# Patient Record
Sex: FEMALE | Race: WHITE | Employment: OTHER | URBAN - METROPOLITAN AREA
[De-identification: names, ages, dates, MRNs, and addresses within clinical notes are randomized per-mention and may not be internally consistent; named-entity substitution may affect disease eponyms.]

---

## 2019-05-06 ENCOUNTER — HOSPITAL ENCOUNTER (INPATIENT)
Facility: CLINIC | Age: 79
LOS: 7 days | Discharge: ANOTHER HOSPITAL | DRG: 189 | End: 2019-05-13
Attending: EMERGENCY MEDICINE | Admitting: HOSPITALIST
Payer: MEDICARE

## 2019-05-06 ENCOUNTER — APPOINTMENT (OUTPATIENT)
Dept: GENERAL RADIOLOGY | Facility: CLINIC | Age: 79
DRG: 189 | End: 2019-05-06
Attending: EMERGENCY MEDICINE
Payer: MEDICARE

## 2019-05-06 DIAGNOSIS — R41.0 SUBACUTE DELIRIUM: ICD-10-CM

## 2019-05-06 DIAGNOSIS — H04.123 DRY EYES: Primary | ICD-10-CM

## 2019-05-06 DIAGNOSIS — E11.21 TYPE 2 DIABETES MELLITUS WITH DIABETIC NEPHROPATHY, UNSPECIFIED WHETHER LONG TERM INSULIN USE (H): ICD-10-CM

## 2019-05-06 DIAGNOSIS — J44.1 COPD EXACERBATION (H): ICD-10-CM

## 2019-05-06 DIAGNOSIS — C34.11 MALIGNANT NEOPLASM OF UPPER LOBE OF RIGHT LUNG (H): ICD-10-CM

## 2019-05-06 DIAGNOSIS — K59.09 OTHER CONSTIPATION: ICD-10-CM

## 2019-05-06 LAB
ANION GAP SERPL CALCULATED.3IONS-SCNC: 5 MMOL/L (ref 3–14)
APTT PPP: 54 SEC (ref 22–37)
BASE EXCESS BLDV CALC-SCNC: 4.4 MMOL/L
BASOPHILS # BLD AUTO: 0 10E9/L (ref 0–0.2)
BASOPHILS NFR BLD AUTO: 0.1 %
BUN SERPL-MCNC: 29 MG/DL (ref 7–30)
CALCIUM SERPL-MCNC: 9 MG/DL (ref 8.5–10.1)
CHLORIDE SERPL-SCNC: 102 MMOL/L (ref 94–109)
CO2 SERPL-SCNC: 31 MMOL/L (ref 20–32)
CREAT SERPL-MCNC: 0.94 MG/DL (ref 0.52–1.04)
DIFFERENTIAL METHOD BLD: ABNORMAL
EOSINOPHIL # BLD AUTO: 0.1 10E9/L (ref 0–0.7)
EOSINOPHIL NFR BLD AUTO: 1.6 %
ERYTHROCYTE [DISTWIDTH] IN BLOOD BY AUTOMATED COUNT: 16 % (ref 10–15)
ERYTHROCYTE [DISTWIDTH] IN BLOOD BY AUTOMATED COUNT: 16 % (ref 10–15)
GFR SERPL CREATININE-BSD FRML MDRD: 58 ML/MIN/{1.73_M2}
GLUCOSE SERPL-MCNC: 158 MG/DL (ref 70–99)
HCO3 BLDV-SCNC: 34 MMOL/L (ref 21–28)
HCT VFR BLD AUTO: 36.3 % (ref 35–47)
HCT VFR BLD AUTO: 37.1 % (ref 35–47)
HGB BLD-MCNC: 11.3 G/DL (ref 11.7–15.7)
HGB BLD-MCNC: 11.4 G/DL (ref 11.7–15.7)
IMM GRANULOCYTES # BLD: 0 10E9/L (ref 0–0.4)
IMM GRANULOCYTES NFR BLD: 0.1 %
INTERPRETATION ECG - MUSE: NORMAL
LACTATE BLD-SCNC: 0.7 MMOL/L (ref 0.7–2)
LYMPHOCYTES # BLD AUTO: 0.3 10E9/L (ref 0.8–5.3)
LYMPHOCYTES NFR BLD AUTO: 3.8 %
MCH RBC QN AUTO: 29.4 PG (ref 26.5–33)
MCH RBC QN AUTO: 29.7 PG (ref 26.5–33)
MCHC RBC AUTO-ENTMCNC: 30.7 G/DL (ref 31.5–36.5)
MCHC RBC AUTO-ENTMCNC: 31.1 G/DL (ref 31.5–36.5)
MCV RBC AUTO: 96 FL (ref 78–100)
MCV RBC AUTO: 96 FL (ref 78–100)
MONOCYTES # BLD AUTO: 0.1 10E9/L (ref 0–1.3)
MONOCYTES NFR BLD AUTO: 1.6 %
NEUTROPHILS # BLD AUTO: 7.4 10E9/L (ref 1.6–8.3)
NEUTROPHILS NFR BLD AUTO: 92.8 %
NRBC # BLD AUTO: 0 10*3/UL
NRBC BLD AUTO-RTO: 0 /100
NT-PROBNP SERPL-MCNC: 551 PG/ML (ref 0–1800)
O2/TOTAL GAS SETTING VFR VENT: ABNORMAL %
OXYHGB MFR BLDV: 54 %
PCO2 BLDV: 80 MM HG (ref 40–50)
PH BLDV: 7.24 PH (ref 7.32–7.43)
PLATELET # BLD AUTO: 175 10E9/L (ref 150–450)
PLATELET # BLD AUTO: 181 10E9/L (ref 150–450)
PO2 BLDV: 35 MM HG (ref 25–47)
POTASSIUM SERPL-SCNC: 3.7 MMOL/L (ref 3.4–5.3)
PROCALCITONIN SERPL-MCNC: 0.06 NG/ML
RBC # BLD AUTO: 3.8 10E12/L (ref 3.8–5.2)
RBC # BLD AUTO: 3.88 10E12/L (ref 3.8–5.2)
SODIUM SERPL-SCNC: 138 MMOL/L (ref 133–144)
WBC # BLD AUTO: 7.9 10E9/L (ref 4–11)
WBC # BLD AUTO: 8.3 10E9/L (ref 4–11)

## 2019-05-06 PROCEDURE — 82805 BLOOD GASES W/O2 SATURATION: CPT | Performed by: EMERGENCY MEDICINE

## 2019-05-06 PROCEDURE — 94640 AIRWAY INHALATION TREATMENT: CPT

## 2019-05-06 PROCEDURE — 40000274 ZZH STATISTIC RCP CONSULT EA 30 MIN

## 2019-05-06 PROCEDURE — 85025 COMPLETE CBC W/AUTO DIFF WBC: CPT | Performed by: EMERGENCY MEDICINE

## 2019-05-06 PROCEDURE — 40000275 ZZH STATISTIC RCP TIME EA 10 MIN

## 2019-05-06 PROCEDURE — 40000141 ZZH STATISTIC PERIPHERAL IV START W/O US GUIDANCE

## 2019-05-06 PROCEDURE — 36415 COLL VENOUS BLD VENIPUNCTURE: CPT | Performed by: PHYSICIAN ASSISTANT

## 2019-05-06 PROCEDURE — 99223 1ST HOSP IP/OBS HIGH 75: CPT | Mod: AI | Performed by: PHYSICIAN ASSISTANT

## 2019-05-06 PROCEDURE — 36415 COLL VENOUS BLD VENIPUNCTURE: CPT | Performed by: EMERGENCY MEDICINE

## 2019-05-06 PROCEDURE — 25000128 H RX IP 250 OP 636: Performed by: EMERGENCY MEDICINE

## 2019-05-06 PROCEDURE — 27210338 ZZH CIRCUIT HUMID FACE/TRACH MSK

## 2019-05-06 PROCEDURE — 94660 CPAP INITIATION&MGMT: CPT

## 2019-05-06 PROCEDURE — 80048 BASIC METABOLIC PNL TOTAL CA: CPT | Performed by: EMERGENCY MEDICINE

## 2019-05-06 PROCEDURE — 83880 ASSAY OF NATRIURETIC PEPTIDE: CPT | Performed by: EMERGENCY MEDICINE

## 2019-05-06 PROCEDURE — 25000125 ZZHC RX 250: Performed by: PHYSICIAN ASSISTANT

## 2019-05-06 PROCEDURE — 25000125 ZZHC RX 250: Performed by: EMERGENCY MEDICINE

## 2019-05-06 PROCEDURE — 12000000 ZZH R&B MED SURG/OB

## 2019-05-06 PROCEDURE — 83605 ASSAY OF LACTIC ACID: CPT | Performed by: HOSPITALIST

## 2019-05-06 PROCEDURE — 99285 EMERGENCY DEPT VISIT HI MDM: CPT | Mod: 25

## 2019-05-06 PROCEDURE — 25000128 H RX IP 250 OP 636: Performed by: PHYSICIAN ASSISTANT

## 2019-05-06 PROCEDURE — 25000128 H RX IP 250 OP 636: Performed by: HOSPITALIST

## 2019-05-06 PROCEDURE — 85730 THROMBOPLASTIN TIME PARTIAL: CPT | Performed by: HOSPITALIST

## 2019-05-06 PROCEDURE — 36415 COLL VENOUS BLD VENIPUNCTURE: CPT | Performed by: HOSPITALIST

## 2019-05-06 PROCEDURE — 94640 AIRWAY INHALATION TREATMENT: CPT | Mod: 76

## 2019-05-06 PROCEDURE — 84145 PROCALCITONIN (PCT): CPT | Performed by: EMERGENCY MEDICINE

## 2019-05-06 PROCEDURE — 85027 COMPLETE CBC AUTOMATED: CPT | Performed by: PHYSICIAN ASSISTANT

## 2019-05-06 PROCEDURE — 25800030 ZZH RX IP 258 OP 636: Performed by: PHYSICIAN ASSISTANT

## 2019-05-06 PROCEDURE — 27210339 ZZH CIRCUIT HUMIDITY W/CPAP BIP

## 2019-05-06 PROCEDURE — 93005 ELECTROCARDIOGRAM TRACING: CPT

## 2019-05-06 PROCEDURE — 71046 X-RAY EXAM CHEST 2 VIEWS: CPT

## 2019-05-06 RX ORDER — CETIRIZINE HYDROCHLORIDE 10 MG/1
10 TABLET ORAL DAILY
COMMUNITY

## 2019-05-06 RX ORDER — CETIRIZINE HYDROCHLORIDE 10 MG/1
10 TABLET ORAL DAILY
Status: DISCONTINUED | OUTPATIENT
Start: 2019-05-06 | End: 2019-05-06

## 2019-05-06 RX ORDER — ALBUTEROL SULFATE 90 UG/1
2 AEROSOL, METERED RESPIRATORY (INHALATION) EVERY 4 HOURS PRN
Status: DISCONTINUED | OUTPATIENT
Start: 2019-05-06 | End: 2019-05-13 | Stop reason: HOSPADM

## 2019-05-06 RX ORDER — FUROSEMIDE 40 MG
20 TABLET ORAL
COMMUNITY
End: 2019-07-12

## 2019-05-06 RX ORDER — METHYLPREDNISOLONE SODIUM SUCCINATE 125 MG/2ML
60 INJECTION, POWDER, LYOPHILIZED, FOR SOLUTION INTRAMUSCULAR; INTRAVENOUS EVERY 12 HOURS
Status: DISCONTINUED | OUTPATIENT
Start: 2019-05-06 | End: 2019-05-13

## 2019-05-06 RX ORDER — AZITHROMYCIN 500 MG/1
500 INJECTION, POWDER, LYOPHILIZED, FOR SOLUTION INTRAVENOUS ONCE
Status: DISCONTINUED | OUTPATIENT
Start: 2019-05-06 | End: 2019-05-06

## 2019-05-06 RX ORDER — ONDANSETRON 2 MG/ML
4 INJECTION INTRAMUSCULAR; INTRAVENOUS EVERY 6 HOURS PRN
Status: DISCONTINUED | OUTPATIENT
Start: 2019-05-06 | End: 2019-05-13 | Stop reason: HOSPADM

## 2019-05-06 RX ORDER — METOPROLOL TARTRATE 100 MG
100 TABLET ORAL 2 TIMES DAILY
Status: DISCONTINUED | OUTPATIENT
Start: 2019-05-06 | End: 2019-05-06

## 2019-05-06 RX ORDER — AMOXICILLIN 250 MG
1 CAPSULE ORAL 2 TIMES DAILY PRN
Status: DISCONTINUED | OUTPATIENT
Start: 2019-05-06 | End: 2019-05-13 | Stop reason: HOSPADM

## 2019-05-06 RX ORDER — AZITHROMYCIN 500 MG/1
250 INJECTION, POWDER, LYOPHILIZED, FOR SOLUTION INTRAVENOUS EVERY 24 HOURS
Status: CANCELLED | OUTPATIENT
Start: 2019-05-07 | End: 2019-05-10

## 2019-05-06 RX ORDER — ACETAMINOPHEN 500 MG
500-1000 TABLET ORAL EVERY 6 HOURS PRN
Status: DISCONTINUED | OUTPATIENT
Start: 2019-05-06 | End: 2019-05-13 | Stop reason: HOSPADM

## 2019-05-06 RX ORDER — NITROGLYCERIN 0.4 MG/1
0.4 TABLET SUBLINGUAL EVERY 5 MIN PRN
Status: DISCONTINUED | OUTPATIENT
Start: 2019-05-06 | End: 2019-05-13 | Stop reason: HOSPADM

## 2019-05-06 RX ORDER — LEVALBUTEROL 1.25 MG/.5ML
1.25 SOLUTION, CONCENTRATE RESPIRATORY (INHALATION)
Status: DISCONTINUED | OUTPATIENT
Start: 2019-05-06 | End: 2019-05-13 | Stop reason: HOSPADM

## 2019-05-06 RX ORDER — LIDOCAINE 40 MG/G
CREAM TOPICAL
Status: DISCONTINUED | OUTPATIENT
Start: 2019-05-06 | End: 2019-05-13 | Stop reason: HOSPADM

## 2019-05-06 RX ORDER — DILTIAZEM HYDROCHLORIDE 300 MG/1
300 CAPSULE, COATED, EXTENDED RELEASE ORAL DAILY
COMMUNITY
End: 2019-06-24

## 2019-05-06 RX ORDER — IPRATROPIUM BROMIDE AND ALBUTEROL SULFATE 2.5; .5 MG/3ML; MG/3ML
3 SOLUTION RESPIRATORY (INHALATION)
Status: DISCONTINUED | OUTPATIENT
Start: 2019-05-06 | End: 2019-05-06

## 2019-05-06 RX ORDER — METHYLPREDNISOLONE SODIUM SUCCINATE 125 MG/2ML
125 INJECTION, POWDER, LYOPHILIZED, FOR SOLUTION INTRAMUSCULAR; INTRAVENOUS ONCE
Status: COMPLETED | OUTPATIENT
Start: 2019-05-06 | End: 2019-05-06

## 2019-05-06 RX ORDER — FUROSEMIDE 20 MG
20 TABLET ORAL
Status: DISCONTINUED | OUTPATIENT
Start: 2019-05-06 | End: 2019-05-06

## 2019-05-06 RX ORDER — ATORVASTATIN CALCIUM 80 MG/1
40 TABLET, FILM COATED ORAL EVERY EVENING
COMMUNITY
Start: 2019-07-12 | End: 2019-07-31

## 2019-05-06 RX ORDER — PROCHLORPERAZINE 25 MG
12.5 SUPPOSITORY, RECTAL RECTAL EVERY 12 HOURS PRN
Status: DISCONTINUED | OUTPATIENT
Start: 2019-05-06 | End: 2019-05-13 | Stop reason: HOSPADM

## 2019-05-06 RX ORDER — POLYETHYLENE GLYCOL 3350 17 G/17G
17 POWDER, FOR SOLUTION ORAL DAILY PRN
Status: DISCONTINUED | OUTPATIENT
Start: 2019-05-06 | End: 2019-05-07

## 2019-05-06 RX ORDER — METOPROLOL TARTRATE 100 MG
100 TABLET ORAL 2 TIMES DAILY
COMMUNITY

## 2019-05-06 RX ORDER — NALOXONE HYDROCHLORIDE 0.4 MG/ML
.1-.4 INJECTION, SOLUTION INTRAMUSCULAR; INTRAVENOUS; SUBCUTANEOUS
Status: DISCONTINUED | OUTPATIENT
Start: 2019-05-06 | End: 2019-05-13 | Stop reason: HOSPADM

## 2019-05-06 RX ORDER — FUROSEMIDE 40 MG
40 TABLET ORAL EVERY MORNING
COMMUNITY
Start: 2019-07-22 | End: 2019-07-25

## 2019-05-06 RX ORDER — ONDANSETRON 4 MG/1
4 TABLET, ORALLY DISINTEGRATING ORAL EVERY 6 HOURS PRN
Status: DISCONTINUED | OUTPATIENT
Start: 2019-05-06 | End: 2019-05-13 | Stop reason: HOSPADM

## 2019-05-06 RX ORDER — AMOXICILLIN 250 MG
2 CAPSULE ORAL 2 TIMES DAILY PRN
Status: DISCONTINUED | OUTPATIENT
Start: 2019-05-06 | End: 2019-05-13 | Stop reason: HOSPADM

## 2019-05-06 RX ORDER — CEFTRIAXONE 2 G/1
2 INJECTION, POWDER, FOR SOLUTION INTRAMUSCULAR; INTRAVENOUS ONCE
Status: DISCONTINUED | OUTPATIENT
Start: 2019-05-06 | End: 2019-05-06

## 2019-05-06 RX ORDER — METHYLPREDNISOLONE SODIUM SUCCINATE 125 MG/2ML
60 INJECTION, POWDER, LYOPHILIZED, FOR SOLUTION INTRAMUSCULAR; INTRAVENOUS EVERY 12 HOURS
Status: DISCONTINUED | OUTPATIENT
Start: 2019-05-06 | End: 2019-05-06

## 2019-05-06 RX ORDER — ATORVASTATIN CALCIUM 40 MG/1
80 TABLET, FILM COATED ORAL EVERY EVENING
Status: DISCONTINUED | OUTPATIENT
Start: 2019-05-06 | End: 2019-05-06

## 2019-05-06 RX ORDER — PROCHLORPERAZINE MALEATE 10 MG
10 TABLET ORAL EVERY 6 HOURS PRN
COMMUNITY
End: 2019-06-11

## 2019-05-06 RX ORDER — PROCHLORPERAZINE MALEATE 5 MG
5 TABLET ORAL EVERY 6 HOURS PRN
Status: DISCONTINUED | OUTPATIENT
Start: 2019-05-06 | End: 2019-05-13 | Stop reason: HOSPADM

## 2019-05-06 RX ORDER — LANOLIN ALCOHOL/MO/W.PET/CERES
400 CREAM (GRAM) TOPICAL DAILY
COMMUNITY
End: 2019-07-31

## 2019-05-06 RX ORDER — CEFTRIAXONE 2 G/1
2 INJECTION, POWDER, FOR SOLUTION INTRAMUSCULAR; INTRAVENOUS EVERY 24 HOURS
Status: CANCELLED | OUTPATIENT
Start: 2019-05-07

## 2019-05-06 RX ORDER — ACETAMINOPHEN 500 MG
500-1000 TABLET ORAL EVERY 6 HOURS PRN
COMMUNITY
End: 2019-06-11

## 2019-05-06 RX ORDER — LEVOFLOXACIN 5 MG/ML
500 INJECTION, SOLUTION INTRAVENOUS EVERY 24 HOURS
Status: DISCONTINUED | OUTPATIENT
Start: 2019-05-06 | End: 2019-05-13

## 2019-05-06 RX ORDER — ONDANSETRON 8 MG/1
8 TABLET, FILM COATED ORAL EVERY 8 HOURS PRN
COMMUNITY
End: 2019-06-11

## 2019-05-06 RX ORDER — FUROSEMIDE 40 MG
40 TABLET ORAL EVERY MORNING
Status: DISCONTINUED | OUTPATIENT
Start: 2019-05-06 | End: 2019-05-06

## 2019-05-06 RX ORDER — MAGNESIUM SULFATE HEPTAHYDRATE 40 MG/ML
2 INJECTION, SOLUTION INTRAVENOUS ONCE
Status: COMPLETED | OUTPATIENT
Start: 2019-05-06 | End: 2019-05-06

## 2019-05-06 RX ORDER — LANOLIN ALCOHOL/MO/W.PET/CERES
400 CREAM (GRAM) TOPICAL DAILY
Status: DISCONTINUED | OUTPATIENT
Start: 2019-05-06 | End: 2019-05-06

## 2019-05-06 RX ORDER — ALBUTEROL SULFATE 90 UG/1
2 AEROSOL, METERED RESPIRATORY (INHALATION) EVERY 4 HOURS PRN
COMMUNITY

## 2019-05-06 RX ORDER — BISACODYL 10 MG
10 SUPPOSITORY, RECTAL RECTAL DAILY PRN
Status: DISCONTINUED | OUTPATIENT
Start: 2019-05-06 | End: 2019-05-13 | Stop reason: HOSPADM

## 2019-05-06 RX ADMIN — LEVALBUTEROL HYDROCHLORIDE 1.25 MG: 1.25 SOLUTION, CONCENTRATE RESPIRATORY (INHALATION) at 15:19

## 2019-05-06 RX ADMIN — METHYLPREDNISOLONE 125 MG: 125 INJECTION, POWDER, LYOPHILIZED, FOR SOLUTION INTRAMUSCULAR; INTRAVENOUS at 08:33

## 2019-05-06 RX ADMIN — LEVALBUTEROL HYDROCHLORIDE 1.25 MG: 1.25 SOLUTION, CONCENTRATE RESPIRATORY (INHALATION) at 11:48

## 2019-05-06 RX ADMIN — LEVALBUTEROL HYDROCHLORIDE 1.25 MG: 1.25 SOLUTION, CONCENTRATE RESPIRATORY (INHALATION) at 23:03

## 2019-05-06 RX ADMIN — LEVALBUTEROL HYDROCHLORIDE 1.25 MG: 1.25 SOLUTION, CONCENTRATE RESPIRATORY (INHALATION) at 19:18

## 2019-05-06 RX ADMIN — HEPARIN SODIUM 850 UNITS/HR: 10000 INJECTION, SOLUTION INTRAVENOUS at 13:14

## 2019-05-06 RX ADMIN — IPRATROPIUM BROMIDE AND ALBUTEROL SULFATE 3 ML: .5; 3 SOLUTION RESPIRATORY (INHALATION) at 08:30

## 2019-05-06 RX ADMIN — MAGNESIUM SULFATE HEPTAHYDRATE 2 G: 40 INJECTION, SOLUTION INTRAVENOUS at 08:30

## 2019-05-06 RX ADMIN — LEVOFLOXACIN 500 MG: 5 INJECTION, SOLUTION INTRAVENOUS at 12:19

## 2019-05-06 RX ADMIN — DILTIAZEM HYDROCHLORIDE 5 MG/HR: 5 INJECTION INTRAVENOUS at 14:24

## 2019-05-06 RX ADMIN — METHYLPREDNISOLONE SODIUM SUCCINATE 62.5 MG: 125 INJECTION, POWDER, FOR SOLUTION INTRAMUSCULAR; INTRAVENOUS at 20:09

## 2019-05-06 ASSESSMENT — ACTIVITIES OF DAILY LIVING (ADL)
ADLS_ACUITY_SCORE: 17
ADLS_ACUITY_SCORE: 16
ADLS_ACUITY_SCORE: 16

## 2019-05-06 ASSESSMENT — MIFFLIN-ST. JEOR: SCORE: 1388.07

## 2019-05-06 ASSESSMENT — ENCOUNTER SYMPTOMS: SHORTNESS OF BREATH: 1

## 2019-05-06 NOTE — ED NOTES
Bed: ED07  Expected date:   Expected time:   Means of arrival:   Comments:  [id number] Female SOB lung CA, yellow in 15 min

## 2019-05-06 NOTE — ED PROVIDER NOTES
"  History     Chief Complaint:  shortness of breath     HPI   Carine Rodriguez is a 79 year old female with a PMHx significant for hypoxia, malignant neoplasm of lung adenocarcinoma, malignant neoplasm of right lung, CKD, hypertension, atrial fibrillation who presents with shortness of breath. The patient reports that she started to have the shortness of breath last evening. The patient has reports that she was started on Chemo 2 days ago and had her last dose of radiation at least 6 weeks ago. She states that her discomfort started last evening whereupon she felt like she was having trouble getting a breath in. She states that it felt like her chest was not expanding sufficiently to get a breath in.  She denies any actual chest discomfort or pain. She denies fevers. The patient receives her cares at the [facility]. I will obtain and review those records.     Allergies:  Allergies no known allergies     Medications:    Albuterol  Atorvastatin  Cetirizine  Furosemide  Metoprolol  Acetaminophen  Apixaban  Diltiazem  Ipratropium  Folic acid  Zofran  Prochlorperazine    Past Medical History:    COPD  Lung cancer  Hypertension  Atrial fibrillation   Kidney stone    Past Surgical History:    Biopsy lung  Appendectomy    Family History:    No family history on file.    Social History:  The patient comes in by herself today. The patient was a former smoker for 51 years between 1962 - 2013.       Review of Systems   Respiratory: Positive for shortness of breath.    All other systems reviewed and are negative.      Physical Exam     Vital signs  Patient Vitals for the past 24 hrs:   BP Temp Temp src Heart Rate Resp SpO2 Height Weight   05/06/19 0723 135/78 97  F (36.1  C) Temporal 87 16 98 % 1.651 m (5' 5\") 90.7 kg (200 lb)          Physical Exam  Constitutional: Alert, attentive, GCS 15  HENT:    Nose: Nose normal.    Mouth/Throat: Oropharynx is clear, mucous membranes are dry  Eyes: EOM are normal, anicteric, conjugate " gaze  CV: regular rate and rhythm; no murmurs  Chest: Central rhonchi, decreased air movement bilaterally.  GI:  non tender. No distension. No guarding or rebound.    MSK: No LE edema, no tenderness to palpation of BLE.  Neurological: Alert, attentive, moving all extremities equally.   Skin: Skin is warm and dry.      Emergency Department Course   ECG (07:48:17):  Rate 103 bpm. WV interval *. QRS duration 80. QT/QTc 342/448. P-R-T axes * 58 56. Afib with rapid ventricular response. Possible anterior infarct, age undetermined  Abnormal EKG.  Interpreted at 0748 by Esvin Phoenix MD.     Imaging:  XR Chest 2 Views   Final Result   IMPRESSION: Patchy opacities are noted in the right mid and lower   lung, with a more focal opacity noted in the left upper lung. Findings   are nonspecific, and could be related to pneumonia, however underlying   malignancy cannot be excluded. Consider chest CT for further   characterization. There is a small right pleural effusion. Cardiac   silhouette is partially obscured, however heart size appears to be   near the upper limits of normal. Pulmonary vascularity is within   normal limits where seen. Aortic calcification.      CELI GONSALEZ MD        Results as read by radiology.   I communicated the results of the imaging studies with the patient who expressed understanding of these findings.      Laboratory:  CBC: HGB 11.3, MCHC 31.1, RDW 16.0, Absolute Lymphocytes 0.3, otherwise normal   BMP: Glucose 158, GFR Estimate 58, otherwise normal     Procalcitonin 0.06    Interventions:  0830: Duo neb 3 mL   0830: Magnesium Sulphate 2 g   0833: Solu-Medrol, 125 mg, IV  1023: Azithromycin (Zithromax) 500 mg vial to attach to ns 250 ml bag     Emergency Department Course:  Past medical records, nursing notes, and vitals reviewed.  0722: I performed an exam of the patient and obtained history, as documented above.     IV inserted and blood drawn for the above work up to be  conducted.     The patient was sent for imaging studies while in the emergency department, findings above.     EKG was obtained, findings as reported above.     I discussed Mr. Rodriguez's case with Dr. Kunal Mcleod who accepted for Stepdown C.   Impression & Plan    Medical Decision Makin-year-old female with past medical history of new diagnosis of right-sided lung cancer undergoing chemoradiation at [facility], A. fib on apixaban and COPD presenting for evaluation of gradually increasing shortness of breath for the weekend without report of fevers but does have increased cough.  She is normally on oxygen only at night but over the last 2 weeks she has had increasing O2 requirements and is wearing 2 L continuously.  Of note she also did have a pneumothorax after lung biopsy but this has resolved.  On exam, she has poor air movement bilaterally with central rhonchi and prolonged expiratory phase consistent with COPD exacerbation.  She was given duo nebs, Solu-Medrol and mag with improvement in air movement but still significant chest tightness.  She has no chest pain or chest pressure, EKG shows A. fib without overt evidence of ischemia, I have low suspicion for ACS.  I have low suspicion for PE given her gradual onset of symptoms and she is on apixaban.  CT imaging deferred as well given a contrast allergy.  Chest x-ray shows right upper lobe density and read seems similar to her prior chest x-ray done at [facility] on 5 3.  Pending Procalcitonin, I will initiate her on Septra medicine for possible infectious etiology though she is not neutropenic.  Patient was placed on BiPAP more for work of breathing though she was able to speak in clear sentences.  Will admit to IM for continued treatment of COPD exacerbation. VBG pending.       Diagnosis:    ICD-10-CM    1. COPD exacerbation (H) J44.1 Procalcitonin   2. Malignant neoplasm of upper lobe of right lung (H) C34.11        Disposition:  Admitted to  [facility] [place]  Esvin Phoenix MD   [employer]  10:40 AM 05/06/19     I, Mary Beth Rand, am serving as a scribe at 10:30 AM on 5/6/2019 to document services personally performed by Esvin Phoenix MD based on my observations and the provider's statements to me.     EMERGENCY DEPARTMENT       Esvin Phoenix MD  05/06/19 1296

## 2019-05-06 NOTE — PLAN OF CARE
A/O x4. AVSS on BiPAP. Tele Afib RVR. Up w/ 1. Pt denies pain. Diltiazem gtt infusing @ 15ml/hr. Heparin infusing @ 8.5 ml/hr. LS course and rhonchi. NPO. Voiding adequate.

## 2019-05-06 NOTE — PROGRESS NOTES
Patient is on BiPAP 12/5 60% with SpO2 in the mid 90's. BS coarse crackles and expiratory wheezes. Gel pad and mepilex in place. Skin intact. Alarm volume set at 10.  Will cont to monitor.  5/6/2019  Senait Wilks RRT

## 2019-05-06 NOTE — H&P
[facility]    History and Physical - Hospitalist Service       Date of Admission:  5/6/2019    Assessment & Plan   Carine Rodriguez is a 79 year old female with PMHx of adenocarcinoma of the right and left lung s/p radiation (completed in November) now on palliative chemotherapy (first round 2 days ago), COPD, hx of tobacco use, atrial fibrillation on chronic anticoagulation with Eliquis, CKD IIII, and HTN admitted on 5/6/2019 for COPD exacerbation. Pt currently utilizing BiPAP, remainder of vitals WNL. Pt currently stable.     COPD exacerbation  Seasonal allergies: Note acute, worsening of SOB in the past 24-48 hours. Hx of COPD exacerbation treated outpatient with steroids about 1 month ago. Notes seasonal allergies, recent flare, with associated nasal congestion, worsening cough. Afebrile. No recent sick exposures. No associated chest pain. Utilizing home inhalers more frequently. Increased oxygen needs; baseline uses CPAP with 2 LPM at bedtime, but has been using 2 LPM continuously at home. CBC, BMP WNL. . Procal 0.06. CXR with noted patchy opacities in the right mid and lower lung with more focal opacity in the SARA. Pt had echocardiogram 5/2 with EF 68%, no RWMA, RVSP 46 mmHg. Received Solumedrol 125 mg X1, Magnesium 2 g, Duoneb X1 in the ED. Given the above, will treat for COPD exacerbation; since I cannot confirm that infiltrates on CXR aren't purely river, will cover pt with antibiotics for CAP. Also note elevated RVSP, thus some degree of pulmonary HTN may be contributing to above.   [PTA Combivent Respimat 200-100 mcg/ACT inhaler, Albuterol inhaler PRN]  -- IMC status   -- BiPAP PRN, Respiratory following; OK to give oral meds. Ween as tolerated   -- Telemetry, continuous pulse oximetry  -- Solumedrol 62.5 mg IV BID   -- Xopenex nebs q4 hours while awake   -- IV Levaquin (PCN allergy noted)   -- RCAT consulted, encourage pulmonary toilet with IS and acapella   -- CBC and BMP in the  AM     ADDENDUM 5/6 1144  VBG with pH 7.24, pCO2 80, pO2 35, bicarb 34. Reviewing prior VBG from 4/30, pH 7.41, pCO2 57, pO2 27, bicarb 36.   -- Continuous BiPAP until improvement in VBGs  -- Strict NPO, discussed with pharmacy. At this time, regarding PTA meds, will start heparin gtt in place of Eliquis for AC for a fib, will start dilt gtt in place of oral dilt; can always add scheduled IV lopressor q6 if rate note controlled on dilt gtt. Hold Lasix dosing at this time. All other PTA meds reviewed and can be held until pt is off BiPAP.   -- Consider CT chest in the AM w/o contrast (note iodinated contrast allergy, also on AC at this point) if ongoing BiPAP use to better characterize respiratory status    Adenocarcinoma of the lung: Follows with Dr. Gonsalez through Oncology at [facility]. Last visit 4/26/18. Most recent CT chest from 4/19/19 with significantly increased size of right upper lobe perihilar mass narrowing the right upper lobe bronchus, increased size of left upper lobe masslike density, new small to moderate size right pleural effusion, slight increase size subcarinal lymph nodes worrisome for metastatic disease. Pt completed radiation treatment (November), now started palliative chemotherapy with first round two days prior to admission.  -- Defer further cares to outpatient specialist through [facility]   -- Pt was scheduled for repeat PFTs 5/6 at [facility], will need to reschedule appointment     Atrial fibrillation, rate controlled   Chronic anticoagulation use:   -- Continue Eliquis 5 mg po BID   -- Continue Cartia  mg po every day, Lopressor 100 mg po BID   -- Telemetry     HTN, hyperlipidemia  VANESSA: Continue PTA Lasix      CKD III  Hx of partial right nephrectomy: Baseline creatinine ~1. Partial right nephrectomy in the setting of kidney stones.   -- Avoid nephrotoxic agents   -- Monitor renal function     Tobacco use disorder, history off: Previously smoked 1 ppd X~40 years, quit 7 years ago.      Hx of pneumothorax: This was following lung bx in 3/2019.      Diet: NPO except for meds while on BiPAP, once off, ok for regular diet   DVT Prophylaxis: Eliquis  Medina Catheter: not present  Code Status: Full Code    Disposition Plan   Expected discharge: 2 - 3 days, recommended to unclear dispo at this time  once antibiotic plan established and O2 use less than 2 liters/minute.  Entered: Margret Jackson PA-C 05/06/2019, 10:34 AM     The patient's care was discussed with the Attending Physician, Dr. Mcleod, Bedside Nurse, Patient and Patient's Family.    Margret Jackson PA-C  [facility]  ______________________________________________________________________    Chief Complaint   SOB    History is obtained from the patient and patient's daughters who accompany her at bedside.     History of Present Illness   Carine Rodriguez is a 79 year old female with PMHx of adenocarcinoma of the right and left lung s/p radiation (completed in November) now on palliative chemotherapy (first round 2 days ago), COPD, hx of tobacco use, atrial fibrillation on chronic anticoagulation with Eliquis, CKD IIII, and HTN admitted on 5/6/2019 for COPD exacerbation. Pt currently utilizing BiPAP, remainder of vitals WNL. Pt currently stable.     Note acute, worsening of SOB in the past 24-48 hours. Hx of COPD exacerbation treated outpatient with steroids about 1 month ago. Notes seasonal allergies, recent flare, with associated nasal congestion, worsening cough. Afebrile. No recent sick exposures. No associated chest pain. Utilizing home inhalers more frequently. Increased oxygen needs; baseline uses CPAP with 2 LPM at bedtime, but has been using 2 LPM continuously at home.     Presented to the ED. Assessed by Dr. Phoenix who obtained basic labs and imaging. CBC, BMP WNL. . Procal 0.06. CXR with noted patchy opacities in the right mid and lower lung with more focal opacity in the  SARA. Pt had echocardiogram 5/2 with EF 68%, no RWMA, RVSP 46 mmHgReceived Solumedrol 125 mg X1, Magnesium 2 g, Duoneb X1 in the ED.     On my interview, pt is accompanied by her two daughters at bedside. Pt appears to have increased work of breathing, intermittent course cough, no production at this point. Afebrile. Did not take AM meds.     Review of Systems    The 10 point Review of Systems is negative other than noted in the HPI.     Past Medical History    1. Adenocarcinoma of the right and left lung  2. COPD; oxygen as below at bedtime, more recently has been utilizing oxygen continuously (2 LPM)  3. KINJAL, CPAP with 2 LPM supplemental O2 at bedtime  4. Pneumothorax; following lung bx in 3/2019.   5. Atrial fibrillation on chronic anticoagulation with Eliquis   6. HTN   7. Hyperlipidemia   8. VANESSA   9. Seasonal allergies   10. CKD III; baseline creatinine ~1.0  11. Hx of partial right nephrectomy in the setting of kidney stones     Past Surgical History   1. Partial right nephrectomy (lower pole) in the setting of reported nephrolithiasis   2. Appendectomy     Social History   Lives alone in a home in [place], [place]. Has walker and cane, but barely uses for ambulatory assistance. Cancer cares through [facility]. Daughters live in the [facility], so patient stays with them when she travels down to [place]. More recently has been utilizing supplemental oxygen at 2 LPM continuously, prior to that, uses CPAP with 2 LPM at bedtime. Tobacco hx smoking 1 ppd X~40 years, quit 7 years ago. Has a couple jerrod's in the evening; no hx of withdrawal.     Family History   Reviewed and non-contributory to current chief complaint.     Prior to Admission Medications   Prior to Admission Medications   Prescriptions Last Dose Informant Patient Reported? Taking?   Ipratropium-Albuterol (COMBIVENT RESPIMAT)  MCG/ACT inhaler  at prn Self Yes Yes   Sig: Inhale 1 puff into the lungs 4 times daily as needed for shortness of breath / dyspnea or  wheezing   acetaminophen (TYLENOL) 500 MG tablet  at prn Self Yes Yes   Sig: Take 500-1,000 mg by mouth every 6 hours as needed for mild pain   albuterol (PROAIR HFA/PROVENTIL HFA/VENTOLIN HFA) 108 (90 Base) MCG/ACT inhaler  at prn Self Yes Yes   Sig: Inhale 2 puffs into the lungs every 4 hours as needed for shortness of breath / dyspnea or wheezing   apixaban ANTICOAGULANT (ELIQUIS) 5 MG tablet 5/5/2019 at pm Self Yes Yes   Sig: Take 5 mg by mouth 2 times daily   atorvastatin (LIPITOR) 80 MG tablet 5/5/2019 at pm Self Yes Yes   Sig: Take 80 mg by mouth every evening   cetirizine (ZYRTEC) 10 MG tablet 5/5/2019 at am Self Yes Yes   Sig: Take 10 mg by mouth daily   diltiazem ER COATED BEADS (CARDIZEM CD/CARTIA XT) 300 MG 24 hr capsule 5/5/2019 at am Self Yes Yes   Sig: Take 300 mg by mouth daily   folic acid (FOLVITE) 400 MCG tablet 5/5/2019 at am Self Yes Yes   Sig: Take 400 mcg by mouth daily   furosemide (LASIX) 40 MG tablet 5/5/2019 at am Self Yes Yes   Sig: Take 40 mg by mouth every morning   furosemide (LASIX) 40 MG tablet 5/5/2019 at 1200 Self Yes Yes   Sig: Take 20 mg by mouth daily (with lunch) At 1200   metoprolol tartrate (LOPRESSOR) 100 MG tablet 5/5/2019 at pm Self Yes Yes   Sig: Take 100 mg by mouth 2 times daily   ondansetron (ZOFRAN) 8 MG tablet  at prn Self Yes Yes   Sig: Take 8 mg by mouth every 8 hours as needed for nausea (Vomiting)   prochlorperazine (COMPAZINE) 10 MG tablet  at prn Self Yes Yes   Sig: Take 10 mg by mouth every 6 hours as needed for nausea or vomiting      Facility-Administered Medications: None     Allergies   Allergies   Allergen Reactions     Amlodipine      Per Care Everywhere Stout       Bupropion      Per CareEverywhere Stout       Penicillins Rash     Sulfa Drugs Rash       Physical Exam   Vital Signs: Temp: 97  F (36.1  C) Temp src: Temporal BP: 135/78   Heart Rate: 87 Resp: 16 SpO2: 98 % O2 Device: None (Room air)    Weight: 200 lbs 0 oz    CONSTITUTIONAL: Pt laying in bed,  dressed in hospital garb. Appears uncomfortable, with mild/mod increased work of breathing. Cooperative with interview. Accompanied by daughters at bedside.   HEENT: Normocephalic, atraumatic. Negative for conjunctival redness or scleral icterus.  Oral mucosa pink and moist; negative for ulcerations, erythema, or exudates.  Dentition in good repair.   CARDIOVASCULAR: Irregularly irregular rhythm, rate controlled. No murmurs, rubs, or extra heart sounds appreciated. Pulses +2/4 and irregular in upper and lower extremities, bilaterally.   RESPIRATORY: No increased work of breathing.  Supplemental oxygenation via NC at 8 LPM. Course rhonchi throughout all fields.   GASTROINTESTINAL:  Abdomen soft, non-distended. BS auscultated in all four quadrants. Negative for tenderness to  palpation.  No masses or organomegaly noted.  MUSCULOSKELETAL: No gross deformities noted. Normal muscle tone.   HEMATOLOGIC/LYMPHATIC/IMMUNOLOGIC: Negative for lower extremity edema, bilaterally.  NEUROLOGIC: Alert and oriented to person, place, and time.  strength intact. No focal neuro deficits appreciated.   SKIN: Warm, dry, intact. No jaundice noted. Negative for suspicious lesions, rashes, bruising, open sores or abrasions.     Data   Data reviewed today: I reviewed all medications, new labs and imaging results over the last 24 hours. I personally reviewed the EKG tracing showing a fib with  bpm. .    Recent Labs   Lab 05/06/19  0800   WBC 7.9   HGB 11.3*   MCV 96         POTASSIUM 3.7   CHLORIDE 102   CO2 31   BUN 29   CR 0.94   ANIONGAP 5   MELANIE 9.0   *     Recent Results (from the past 24 hour(s))   XR Chest 2 Views    Narrative    CHEST TWO VIEWS May 6, 2019 8:13 AM     HISTORY: Shortness of breath.    COMPARISON: None.      Impression    IMPRESSION: Patchy opacities are noted in the right mid and lower  lung, with a more focal opacity noted in the left upper lung. Findings  are nonspecific, and could be  related to pneumonia, however underlying  malignancy cannot be excluded. Consider chest CT for further  characterization. There is a small right pleural effusion. Cardiac  silhouette is partially obscured, however heart size appears to be  near the upper limits of normal. Pulmonary vascularity is within  normal limits where seen. Aortic calcification.    CELI GONSALEZ MD

## 2019-05-06 NOTE — PLAN OF CARE
PT: PT consult received. Chart reviewed. Pt admitted a few hours ago, currently on Bipap. Will hold PT today and allow for 24hours of medical management prior to initiation of PT services.

## 2019-05-06 NOTE — PHARMACY-ADMISSION MEDICATION HISTORY
Admission medication history interview status for the 5/6/2019  admission is complete. See EPIC admission navigator for prior to admission medications     Medication history source reliability:Moderate    Actions taken by pharmacist (provider contacted, etc):  Spoke w/ patient and her daughters.  Reviewed information in Care Everywhere ([facility]).        Additional medication history information not noted on PTA med list :None    Medication reconciliation/reorder completed by provider prior to medication history? No    Time spent in this activity: 15 minutes    Prior to Admission medications    Medication Sig Last Dose Taking? Auth Provider   acetaminophen (TYLENOL) 500 MG tablet Take 500-1,000 mg by mouth every 6 hours as needed for mild pain  at prn Yes Unknown, Entered By History   albuterol (PROAIR HFA/PROVENTIL HFA/VENTOLIN HFA) 108 (90 Base) MCG/ACT inhaler Inhale 2 puffs into the lungs every 4 hours as needed for shortness of breath / dyspnea or wheezing  at prn Yes Unknown, Entered By History   apixaban ANTICOAGULANT (ELIQUIS) 5 MG tablet Take 5 mg by mouth 2 times daily 5/5/2019 at pm Yes Unknown, Entered By History   atorvastatin (LIPITOR) 80 MG tablet Take 80 mg by mouth every evening 5/5/2019 at pm Yes Unknown, Entered By History   cetirizine (ZYRTEC) 10 MG tablet Take 10 mg by mouth daily 5/5/2019 at am Yes Unknown, Entered By History   diltiazem ER COATED BEADS (CARDIZEM CD/CARTIA XT) 300 MG 24 hr capsule Take 300 mg by mouth daily 5/5/2019 at am Yes Unknown, Entered By History   folic acid (FOLVITE) 400 MCG tablet Take 400 mcg by mouth daily 5/5/2019 at am Yes Unknown, Entered By History   furosemide (LASIX) 40 MG tablet Take 40 mg by mouth every morning 5/5/2019 at am Yes Unknown, Entered By History   furosemide (LASIX) 40 MG tablet Take 20 mg by mouth daily (with lunch) At 1200 5/5/2019 at 1200 Yes Unknown, Entered By History   Ipratropium-Albuterol (COMBIVENT RESPIMAT)  MCG/ACT inhaler  Inhale 1 puff into the lungs 4 times daily as needed for shortness of breath / dyspnea or wheezing  at prn Yes Unknown, Entered By History   metoprolol tartrate (LOPRESSOR) 100 MG tablet Take 100 mg by mouth 2 times daily 5/5/2019 at pm Yes Unknown, Entered By History   ondansetron (ZOFRAN) 8 MG tablet Take 8 mg by mouth every 8 hours as needed for nausea (Vomiting)  at prn Yes Unknown, Entered By History   prochlorperazine (COMPAZINE) 10 MG tablet Take 10 mg by mouth every 6 hours as needed for nausea or vomiting  at prn Yes Unknown, Entered By History     Shyann Mayo, PharmD, BCPS

## 2019-05-06 NOTE — ED NOTES
"[facility]  ED Nurse Handoff Report    ED Chief complaint: Shortness of Breath (started last night/currently getting chemo for lung cancer)      ED Diagnosis:   Final diagnoses:   COPD exacerbation (H)   Malignant neoplasm of upper lobe of right lung (H)       Code Status: Full Code    Allergies: Allergies no known allergies    Activity level - Baseline/Home:  Independent    Activity Level - Current:   Stand with Assist     Needed?: No    Isolation: No  Infection: Not Applicable  Bariatric?: No    Vital Signs:   Vitals:    05/06/19 0723   BP: 135/78   Resp: 16   Temp: 97  F (36.1  C)   TempSrc: Temporal   SpO2: 98%   Weight: 90.7 kg (200 lb)   Height: 1.651 m (5' 5\")       Cardiac Rhythm: ,        Pain level:      Is this patient confused?: No   Does this patient have a guardian?  No         If yes, is there guardianship documents in the Epic \"Code/ACP\" activity?  N/A         Guardian Notified?  N/A  [place] - Suicide Severity Rating Scale Completed?  Yes  If yes, what color did the patient score?  White    Patient Report: Initial Complaint: pt is being treated for lung cancer, has been feeling sob since friday  Focused Assessment: pt is a&ox4  Tests Performed: labs xray  Abnormal Results:   Abnormal Labs Reviewed   CBC WITH PLATELETS DIFFERENTIAL - Abnormal; Notable for the following components:       Result Value    Hemoglobin 11.3 (*)     MCHC 31.1 (*)     RDW 16.0 (*)     Absolute Lymphocytes 0.3 (*)     All other components within normal limits   BASIC METABOLIC PANEL - Abnormal; Notable for the following components:    Glucose 158 (*)     GFR Estimate 58 (*)     All other components within normal limits     Treatments provided:     Family Comments: daughters bedside    OBS brochure/video discussed/provided to patient/family: N/A              Name of person given brochure if not patient:               Relationship to patient:     ED Medications:   Medications   ipratropium - " albuterol 0.5 mg/2.5 mg/3 mL (DUONEB) neb solution 3 mL (3 mLs Nebulization Given 5/6/19 0830)   cefTRIAXone (ROCEPHIN) 2 g vial to attach to  ml bag for ADULTS or NS 50 ml bag for PEDS (has no administration in time range)   azithromycin (ZITHROMAX) 500 mg vial to attach to  mL bag (has no administration in time range)   methylPREDNISolone sodium succinate (solu-MEDROL) injection 125 mg (125 mg Intravenous Given 5/6/19 0833)   magnesium sulfate 2 g in water intermittent infusion (2 g Intravenous New Bag 5/6/19 0830)       Drips infusing?:  No    For the majority of the shift this patient was Green.   Interventions performed were .    Severe Sepsis OR Septic Shock Diagnosis Present: No    To be done/followed up on inpatient unit:      ED NURSE PHONE NUMBER: [phone]

## 2019-05-06 NOTE — PROGRESS NOTES
[facility] RCAT Note    Date:5/6/2019  Admission Diagnosis:COPD exacerbation  Pulmonary History:COPD and lung cancer  Home Nebulizer/ MDI Use:Combivent Respimat QID and Albuterol MDI prn  Home Oxygen Use:2L NC at bedtime, but has been using 2L NC continuously the past few days  Acuity Level (from RT Assessment flow sheet):2    Aerosol Therapy Initiated:Xopenex Q4w/a per MD order due to increased HR.       Pulmonary Hygiene Initiated:Aerobika QID per MD order      Volume Expansion Therapy Initiated:Pt is currently on continuous BiPAP 12/5 60%.      Current Oxygen Requirement:BiPAP 12/5 60%  Current SpO2:95%    Re-evaluation date:5/9/2019    See 'RT Assessments' flow sheet for patient assessment scoring and Acuity Level Details.  Senait Wilks RRT

## 2019-05-07 LAB
ANION GAP SERPL CALCULATED.3IONS-SCNC: 3 MMOL/L (ref 3–14)
APTT PPP: 44 SEC (ref 22–37)
APTT PPP: 48 SEC (ref 22–37)
APTT PPP: 51 SEC (ref 22–37)
APTT PPP: 58 SEC (ref 22–37)
BASOPHILS # BLD AUTO: 0 10E9/L (ref 0–0.2)
BASOPHILS NFR BLD AUTO: 0 %
BUN SERPL-MCNC: 25 MG/DL (ref 7–30)
CALCIUM SERPL-MCNC: 9.3 MG/DL (ref 8.5–10.1)
CHLORIDE SERPL-SCNC: 104 MMOL/L (ref 94–109)
CO2 SERPL-SCNC: 31 MMOL/L (ref 20–32)
CREAT SERPL-MCNC: 0.81 MG/DL (ref 0.52–1.04)
DIFFERENTIAL METHOD BLD: ABNORMAL
EOSINOPHIL # BLD AUTO: 0 10E9/L (ref 0–0.7)
EOSINOPHIL NFR BLD AUTO: 0 %
ERYTHROCYTE [DISTWIDTH] IN BLOOD BY AUTOMATED COUNT: 15.6 % (ref 10–15)
GFR SERPL CREATININE-BSD FRML MDRD: 69 ML/MIN/{1.73_M2}
GLUCOSE SERPL-MCNC: 238 MG/DL (ref 70–99)
HCT VFR BLD AUTO: 34.5 % (ref 35–47)
HGB BLD-MCNC: 10.6 G/DL (ref 11.7–15.7)
IMM GRANULOCYTES # BLD: 0 10E9/L (ref 0–0.4)
IMM GRANULOCYTES NFR BLD: 0.5 %
LMWH PPP CHRO-ACNC: 1.66 IU/ML
LMWH PPP CHRO-ACNC: 1.77 IU/ML
LYMPHOCYTES # BLD AUTO: 0.1 10E9/L (ref 0.8–5.3)
LYMPHOCYTES NFR BLD AUTO: 2.3 %
MCH RBC QN AUTO: 29.1 PG (ref 26.5–33)
MCHC RBC AUTO-ENTMCNC: 30.7 G/DL (ref 31.5–36.5)
MCV RBC AUTO: 95 FL (ref 78–100)
MONOCYTES # BLD AUTO: 0 10E9/L (ref 0–1.3)
MONOCYTES NFR BLD AUTO: 0.2 %
NEUTROPHILS # BLD AUTO: 4.2 10E9/L (ref 1.6–8.3)
NEUTROPHILS NFR BLD AUTO: 97 %
NRBC # BLD AUTO: 0 10*3/UL
NRBC BLD AUTO-RTO: 0 /100
PLATELET # BLD AUTO: 177 10E9/L (ref 150–450)
POTASSIUM SERPL-SCNC: 4.4 MMOL/L (ref 3.4–5.3)
RBC # BLD AUTO: 3.64 10E12/L (ref 3.8–5.2)
SODIUM SERPL-SCNC: 138 MMOL/L (ref 133–144)
WBC # BLD AUTO: 4.4 10E9/L (ref 4–11)

## 2019-05-07 PROCEDURE — 25000125 ZZHC RX 250: Performed by: PHYSICIAN ASSISTANT

## 2019-05-07 PROCEDURE — 80048 BASIC METABOLIC PNL TOTAL CA: CPT | Performed by: PHYSICIAN ASSISTANT

## 2019-05-07 PROCEDURE — 40000275 ZZH STATISTIC RCP TIME EA 10 MIN

## 2019-05-07 PROCEDURE — 85025 COMPLETE CBC W/AUTO DIFF WBC: CPT | Performed by: PHYSICIAN ASSISTANT

## 2019-05-07 PROCEDURE — 94640 AIRWAY INHALATION TREATMENT: CPT | Mod: 76

## 2019-05-07 PROCEDURE — 25000128 H RX IP 250 OP 636: Performed by: PHYSICIAN ASSISTANT

## 2019-05-07 PROCEDURE — 25000128 H RX IP 250 OP 636

## 2019-05-07 PROCEDURE — 12000000 ZZH R&B MED SURG/OB

## 2019-05-07 PROCEDURE — A9270 NON-COVERED ITEM OR SERVICE: HCPCS | Performed by: HOSPITALIST

## 2019-05-07 PROCEDURE — 85730 THROMBOPLASTIN TIME PARTIAL: CPT | Performed by: PHYSICIAN ASSISTANT

## 2019-05-07 PROCEDURE — 94640 AIRWAY INHALATION TREATMENT: CPT

## 2019-05-07 PROCEDURE — 85730 THROMBOPLASTIN TIME PARTIAL: CPT | Performed by: HOSPITALIST

## 2019-05-07 PROCEDURE — 25800030 ZZH RX IP 258 OP 636: Performed by: PHYSICIAN ASSISTANT

## 2019-05-07 PROCEDURE — 25000132 ZZH RX MED GY IP 250 OP 250 PS 637: Performed by: HOSPITALIST

## 2019-05-07 PROCEDURE — 85520 HEPARIN ASSAY: CPT | Performed by: PHYSICIAN ASSISTANT

## 2019-05-07 PROCEDURE — 25000132 ZZH RX MED GY IP 250 OP 250 PS 637: Performed by: PHYSICIAN ASSISTANT

## 2019-05-07 PROCEDURE — 99232 SBSQ HOSP IP/OBS MODERATE 35: CPT | Performed by: HOSPITALIST

## 2019-05-07 PROCEDURE — 94660 CPAP INITIATION&MGMT: CPT

## 2019-05-07 PROCEDURE — 85520 HEPARIN ASSAY: CPT | Performed by: HOSPITALIST

## 2019-05-07 PROCEDURE — 36415 COLL VENOUS BLD VENIPUNCTURE: CPT | Performed by: HOSPITALIST

## 2019-05-07 PROCEDURE — A9270 NON-COVERED ITEM OR SERVICE: HCPCS | Performed by: PHYSICIAN ASSISTANT

## 2019-05-07 PROCEDURE — 36415 COLL VENOUS BLD VENIPUNCTURE: CPT | Performed by: PHYSICIAN ASSISTANT

## 2019-05-07 PROCEDURE — 94799 UNLISTED PULMONARY SVC/PX: CPT

## 2019-05-07 RX ORDER — FUROSEMIDE 40 MG
40 TABLET ORAL EVERY MORNING
Status: DISCONTINUED | OUTPATIENT
Start: 2019-05-08 | End: 2019-05-08

## 2019-05-07 RX ORDER — FUROSEMIDE 20 MG
20 TABLET ORAL
Status: DISCONTINUED | OUTPATIENT
Start: 2019-05-07 | End: 2019-05-08

## 2019-05-07 RX ORDER — METOPROLOL TARTRATE 100 MG
100 TABLET ORAL 2 TIMES DAILY
Status: DISCONTINUED | OUTPATIENT
Start: 2019-05-07 | End: 2019-05-13 | Stop reason: HOSPADM

## 2019-05-07 RX ORDER — POLYETHYLENE GLYCOL 3350 17 G/17G
17 POWDER, FOR SOLUTION ORAL 2 TIMES DAILY PRN
Status: DISCONTINUED | OUTPATIENT
Start: 2019-05-07 | End: 2019-05-13 | Stop reason: HOSPADM

## 2019-05-07 RX ADMIN — METHYLPREDNISOLONE SODIUM SUCCINATE 62.5 MG: 125 INJECTION, POWDER, FOR SOLUTION INTRAMUSCULAR; INTRAVENOUS at 19:19

## 2019-05-07 RX ADMIN — METOPROLOL TARTRATE 100 MG: 100 TABLET, FILM COATED ORAL at 21:13

## 2019-05-07 RX ADMIN — LEVALBUTEROL HYDROCHLORIDE 1.25 MG: 1.25 SOLUTION, CONCENTRATE RESPIRATORY (INHALATION) at 20:30

## 2019-05-07 RX ADMIN — METHYLPREDNISOLONE SODIUM SUCCINATE 62.5 MG: 125 INJECTION, POWDER, FOR SOLUTION INTRAMUSCULAR; INTRAVENOUS at 08:59

## 2019-05-07 RX ADMIN — DILTIAZEM HYDROCHLORIDE 15 MG/HR: 5 INJECTION INTRAVENOUS at 07:45

## 2019-05-07 RX ADMIN — DILTIAZEM HYDROCHLORIDE 15 MG/HR: 5 INJECTION INTRAVENOUS at 00:19

## 2019-05-07 RX ADMIN — DILTIAZEM HYDROCHLORIDE 300 MG: 180 CAPSULE, EXTENDED RELEASE ORAL at 16:02

## 2019-05-07 RX ADMIN — LEVOFLOXACIN 500 MG: 5 INJECTION, SOLUTION INTRAVENOUS at 11:22

## 2019-05-07 RX ADMIN — ACETAMINOPHEN 1000 MG: 500 TABLET, FILM COATED ORAL at 21:13

## 2019-05-07 RX ADMIN — HEPARIN SODIUM 850 UNITS/HR: 10000 INJECTION, SOLUTION INTRAVENOUS at 09:05

## 2019-05-07 RX ADMIN — FUROSEMIDE 20 MG: 20 TABLET ORAL at 16:02

## 2019-05-07 RX ADMIN — LEVALBUTEROL HYDROCHLORIDE 1.25 MG: 1.25 SOLUTION, CONCENTRATE RESPIRATORY (INHALATION) at 10:58

## 2019-05-07 RX ADMIN — LEVALBUTEROL HYDROCHLORIDE 1.25 MG: 1.25 SOLUTION, CONCENTRATE RESPIRATORY (INHALATION) at 15:09

## 2019-05-07 RX ADMIN — LEVALBUTEROL HYDROCHLORIDE 1.25 MG: 1.25 SOLUTION, CONCENTRATE RESPIRATORY (INHALATION) at 07:36

## 2019-05-07 ASSESSMENT — ACTIVITIES OF DAILY LIVING (ADL)
ADLS_ACUITY_SCORE: 20
ADLS_ACUITY_SCORE: 22

## 2019-05-07 NOTE — CONSULTS
Care Transition Initial Assessment - SW     Met with: chart review   Active Problems:    COPD exacerbation (H)       DATA  Lives With: alone   Quality of Family Relationships: involved, supportive  Description of Support System: Supportive, Involved  Who is your support system?: Children  Support Assessment: Adequate family and caregiver support.   Identified issues/concerns regarding health management: SW following for d.c needs  Quality of Family Relationships: involved, supportive     ASSESSMENT  Cognitive Status:  Alert and oriented X4  Concerns to be addressed: patient is a 79 year old female who was admitted to the hospital for COPD exacerbation. Prior to hospitalization patient was living in [facility] where she was managing well. Patient was on 2L O2 at baseline. Patient has been requiring BiPAP, however, when SW met with patient she was on nasal cannula, not BiPAP. Patient is aware therapy will assess patient and make recommendations for d.c. Patient would like to return home and if necessary would be ok with home care. SW will continue to follow and assist with any discharge needs that arise. Sw should also watch for an increase O2 needs.     PLAN  Financial costs for the patient includes   Patient given options and choices for discharge   Patient/family is agreeable to the plan?  YES  Patient Goals and Preferences: Home with possible HC  Patient anticipates discharging to:  Home    MANOLO Blanchard   *[phone]

## 2019-05-07 NOTE — PLAN OF CARE
PT/OT: Consult received. Chart reviewed. Pt on Bipap overnight. RN reports pt currently at sitting at EOB off Bipap with SPO2 87-88%. Will defer therapies today due to resp status. Will recheck status tomorrow. RN in agreement.

## 2019-05-07 NOTE — PLAN OF CARE
A/Ox4. Tele Afib CVR to RVR. VSS ex HR , Hypertensive BP 140s-150s/ 60s-80s. IMC status on BiPap, tolerating, with scheduled nebs. LS bilateral dim, rhonchi, crackles and inspiratory wheezing. CMS intact. Hep gtt at 8.5ml/hr. Dilt gtt at 15ml/hr. Denies pain. NPO, no exceptions. Voiding in BSC, adequate UOP. Ambulating A1. Daughters supportive. Plan for SW and Care Coordinator consults tomorrow. Possible CT today. Discharge pending 2-3 days. Continue to monitor.

## 2019-05-07 NOTE — PROGRESS NOTES
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 Pt currently utilizing BiPAP, remainder of vitals WNL. Pt currently stable.      COPD exacerbation  Seasonal allergies: Note acute, worsening of SOB in the past 24-48 hours. Hx of COPD exacerbation treated outpatient with steroids about 1 month ago. Notes seasonal allergies, recent flare, with associated nasal congestion, worsening cough. Afebrile. No recent sick exposures. No associated chest pain. Utilizing home inhalers more frequently. Increased oxygen needs; baseline uses CPAP with 2 LPM at bedtime, but has been using 2 LPM continuously at home. CBC, BMP WNL. . Procal 0.06. CXR with noted patchy opacities in the right mid and lower lung with more focal opacity in the SARA. Pt had echocardiogram 5/2 with EF 68%, no RWMA, RVSP 46 mmHg. Received Solumedrol 125 mg X1, Magnesium 2 g, Duoneb X1 in the ED. Given the above, will treat for COPD exacerbation; since I cannot confirm that infiltrates on CXR aren't purely river, will cover pt with antibiotics for CAP. Also note elevated RVSP, thus some degree of pulmonary HTN may be contributing to above.   [PTA Combivent Respimat 200-100 mcg/ACT inhaler, Albuterol inhaler PRN]  -- Continue [facility] status   -- BiPAP was transitioned from constant to PRN, Respiratory following; OK now to give oral meds. Ween as tolerated   -- Telemetry, continuous pulse oximetry  -- Continue Solumedrol 62.5 mg IV BID   -- Xopenex nebs q4 hours while awake   -- IV Levaquin (PCN allergy noted)   -- RCAT consulted, encourage  pulmonary toilet with IS and acapella   -- CBC and BMP in the AM    -- Since slowly coming off of Bipap, we will slowly start back on PTA meds, will continue heparin gtt for another night in place of Eliquis for AC for a fib, will keep dilt gtt in place but restart on PTA oral dilt, metoprolol and Lasix.    -- Consider CT chest in the AM w/o contrast (note iodinated contrast allergy, also on AC at this point) if worsening and will have to get pulmonary involved      Adenocarcinoma of the lung: Follows with Dr. Gonsalez through Oncology at [facility]. Last visit 4/26/18. Most recent CT chest from 4/19/19 with significantly increased size of right upper lobe perihilar mass narrowing the right upper lobe bronchus, increased size of left upper lobe masslike density, new small to moderate size right pleural effusion, slight increase size subcarinal lymph nodes worrisome for metastatic disease. Pt completed radiation treatment (November), now started palliative chemotherapy with first round two days prior to admission.  -- Defer further cares to outpatient specialist through [facility]   -- Pt was scheduled for repeat PFTs 5/6 at [facility], will need to reschedule appointment   -- Hold Pulmonary consult as appears to be improving, however if worsening or plateau, then consider their involvement     Atrial fibrillation, rate controlled   Chronic anticoagulation use:   -- Hold PTA Eliquis 5 mg po BID   -- Continue Cartia  mg po every day, Lopressor 100 mg po BID   -- Telemetry      HTN, hyperlipidemia  VANESSA: Restart on PTA Lasix       CKD III  Hx of partial right nephrectomy: Baseline creatinine ~1. Partial right nephrectomy in the setting of kidney stones.   -- Avoid nephrotoxic agents   -- Monitor renal function     Tobacco use disorder, history off: Previously smoked 1 ppd X~40 years, quit 7 years ago.      Hx of pneumothorax: This was following lung bx in 3/2019.             Diet: Regular Diet Adult    DVT Prophylaxis: ON  heparin gtt  Medina Catheter: not present  Code Status: Full Code      Disposition Plan   Expected discharge: 2 - 3 days, recommended to prior living arrangement once O2 use less than 3 liters/minute.  Entered: Kunal Mcleod MD 05/07/2019, 2:32 PM       The patient's care was discussed with the Bedside Nurse, Patient and Patient's Family.    Kunal Mcleod MD  Hospitalist Service  [facility]    ______________________________________________________________________    Interval History   Px off BIPAP but RODRIGUEZ to bathroom.  No chest pain.  No change in cough.  Requests use of CPAP at night.  No change in pulmonary management and hold on higher level of intervention for now.      Data reviewed today: I reviewed all medications, new labs and imaging results over the last 24 hours. I personally reviewed no images or EKG's today.    Physical Exam   Vital Signs: Temp: 98.7  F (37.1  C) Temp src: Oral BP: 153/80 Pulse: 120 Heart Rate: 99 Resp: 11 SpO2: 94 % O2 Device: BiPAP/CPAP    Weight: 200 lbs 0 oz    CONSTITUTIONAL: Pt laying in bed, a bit tachypnea but improved from yesterday with mild/mod increased work of breathing. Cooperative with interview. Accompanied by daughters at bedside.   HEENT: Normocephalic, atraumatic. Negative for conjunctival redness or scleral icterus.  Oral mucosa pink and moist; negative for ulcerations, erythema, or exudates.  Dentition in good repair.   CARDIOVASCULAR: Irregularly irregular rhythm, rate controlled. No murmurs, rubs, or extra heart sounds appreciated. Pulses +2/4 and irregular in upper and lower extremities, bilaterally.   RESPIRATORY: No increased work of breathing.  Supplemental oxygenation via NC. Course rhonchi throughout all fields.   GASTROINTESTINAL:  Abdomen soft, non-distended. BS auscultated in all four quadrants. Negative for tenderness to  palpation.  No masses or organomegaly noted.  MUSCULOSKELETAL: No gross deformities noted. Normal muscle  tone.   HEMATOLOGIC/LYMPHATIC/IMMUNOLOGIC: Negative for lower extremity edema, bilaterally.  NEUROLOGIC: Alert and oriented to person, place, and time.  strength intact. No focal neuro deficits appreciated.   SKIN: Warm, dry, intact. No jaundice noted. Negative for suspicious lesions, rashes, bruising, open sores or abrasions.          Data   Recent Labs   Lab 05/07/19  0714 05/06/19  1235 05/06/19  0800   WBC 4.4 8.3 7.9   HGB 10.6* 11.4* 11.3*   MCV 95 96 96    175 181     --  138   POTASSIUM 4.4  --  3.7   CHLORIDE 104  --  102   CO2 31  --  31   BUN 25  --  29   CR 0.81  --  0.94   ANIONGAP 3  --  5   MELANIE 9.3  --  9.0   *  --  158*     No results found for this or any previous visit (from the past 24 hour(s)).

## 2019-05-07 NOTE — PLAN OF CARE
VSS, however pt's HR remains in the 120's, a-fib with RVR.  Pt denies pain.  Pt's lungs remain coarse throughout all lung fields.  Pt is on 10L via oxymizer nasal cannula, O2 sats around 92%.  Pt remains on the Cardizem gtt and heparin gtt per MD orders.

## 2019-05-08 ENCOUNTER — APPOINTMENT (OUTPATIENT)
Dept: PHYSICAL THERAPY | Facility: CLINIC | Age: 79
DRG: 189 | End: 2019-05-08
Payer: MEDICARE

## 2019-05-08 LAB
ANION GAP SERPL CALCULATED.3IONS-SCNC: 6 MMOL/L (ref 3–14)
APTT PPP: 44 SEC (ref 22–37)
APTT PPP: 76 SEC (ref 22–37)
BUN SERPL-MCNC: 47 MG/DL (ref 7–30)
CALCIUM SERPL-MCNC: 9.5 MG/DL (ref 8.5–10.1)
CHLORIDE SERPL-SCNC: 103 MMOL/L (ref 94–109)
CO2 SERPL-SCNC: 31 MMOL/L (ref 20–32)
CREAT SERPL-MCNC: 1.16 MG/DL (ref 0.52–1.04)
GFR SERPL CREATININE-BSD FRML MDRD: 45 ML/MIN/{1.73_M2}
GLUCOSE SERPL-MCNC: 281 MG/DL (ref 70–99)
LMWH PPP CHRO-ACNC: 1.26 IU/ML
POTASSIUM SERPL-SCNC: 4.4 MMOL/L (ref 3.4–5.3)
SODIUM SERPL-SCNC: 140 MMOL/L (ref 133–144)

## 2019-05-08 PROCEDURE — 99232 SBSQ HOSP IP/OBS MODERATE 35: CPT | Performed by: HOSPITALIST

## 2019-05-08 PROCEDURE — 25800030 ZZH RX IP 258 OP 636: Performed by: PHYSICIAN ASSISTANT

## 2019-05-08 PROCEDURE — 80048 BASIC METABOLIC PNL TOTAL CA: CPT | Performed by: PHYSICIAN ASSISTANT

## 2019-05-08 PROCEDURE — 85730 THROMBOPLASTIN TIME PARTIAL: CPT | Performed by: PHYSICIAN ASSISTANT

## 2019-05-08 PROCEDURE — 94640 AIRWAY INHALATION TREATMENT: CPT

## 2019-05-08 PROCEDURE — 97162 PT EVAL MOD COMPLEX 30 MIN: CPT | Mod: GP

## 2019-05-08 PROCEDURE — 97110 THERAPEUTIC EXERCISES: CPT | Mod: GP

## 2019-05-08 PROCEDURE — 36415 COLL VENOUS BLD VENIPUNCTURE: CPT | Performed by: HOSPITALIST

## 2019-05-08 PROCEDURE — A9270 NON-COVERED ITEM OR SERVICE: HCPCS | Performed by: HOSPITALIST

## 2019-05-08 PROCEDURE — 85520 HEPARIN ASSAY: CPT | Performed by: PHYSICIAN ASSISTANT

## 2019-05-08 PROCEDURE — 25000128 H RX IP 250 OP 636: Performed by: HOSPITALIST

## 2019-05-08 PROCEDURE — 40000275 ZZH STATISTIC RCP TIME EA 10 MIN

## 2019-05-08 PROCEDURE — 12000000 ZZH R&B MED SURG/OB

## 2019-05-08 PROCEDURE — 25000128 H RX IP 250 OP 636: Performed by: PHYSICIAN ASSISTANT

## 2019-05-08 PROCEDURE — 25000132 ZZH RX MED GY IP 250 OP 250 PS 637: Performed by: HOSPITALIST

## 2019-05-08 PROCEDURE — 36415 COLL VENOUS BLD VENIPUNCTURE: CPT | Performed by: PHYSICIAN ASSISTANT

## 2019-05-08 PROCEDURE — 25000125 ZZHC RX 250: Performed by: PHYSICIAN ASSISTANT

## 2019-05-08 PROCEDURE — 94640 AIRWAY INHALATION TREATMENT: CPT | Mod: 76

## 2019-05-08 PROCEDURE — 85730 THROMBOPLASTIN TIME PARTIAL: CPT | Performed by: HOSPITALIST

## 2019-05-08 RX ADMIN — METHYLPREDNISOLONE SODIUM SUCCINATE 62.5 MG: 125 INJECTION, POWDER, FOR SOLUTION INTRAMUSCULAR; INTRAVENOUS at 21:11

## 2019-05-08 RX ADMIN — METHYLPREDNISOLONE SODIUM SUCCINATE 62.5 MG: 125 INJECTION, POWDER, FOR SOLUTION INTRAMUSCULAR; INTRAVENOUS at 08:57

## 2019-05-08 RX ADMIN — APIXABAN 5 MG: 5 TABLET, FILM COATED ORAL at 21:12

## 2019-05-08 RX ADMIN — Medication 2100 UNITS: at 06:26

## 2019-05-08 RX ADMIN — LEVOFLOXACIN 500 MG: 5 INJECTION, SOLUTION INTRAVENOUS at 11:57

## 2019-05-08 RX ADMIN — FUROSEMIDE 20 MG: 20 TABLET ORAL at 11:57

## 2019-05-08 RX ADMIN — DILTIAZEM HYDROCHLORIDE 10 MG/HR: 5 INJECTION INTRAVENOUS at 03:18

## 2019-05-08 RX ADMIN — METOPROLOL TARTRATE 100 MG: 100 TABLET, FILM COATED ORAL at 21:12

## 2019-05-08 RX ADMIN — DILTIAZEM HYDROCHLORIDE 300 MG: 180 CAPSULE, EXTENDED RELEASE ORAL at 08:57

## 2019-05-08 RX ADMIN — LEVALBUTEROL HYDROCHLORIDE 1.25 MG: 1.25 SOLUTION, CONCENTRATE RESPIRATORY (INHALATION) at 07:33

## 2019-05-08 RX ADMIN — ALBUTEROL SULFATE 2 PUFF: 90 INHALANT RESPIRATORY (INHALATION) at 03:25

## 2019-05-08 RX ADMIN — FUROSEMIDE 40 MG: 40 TABLET ORAL at 08:57

## 2019-05-08 RX ADMIN — METOPROLOL TARTRATE 100 MG: 100 TABLET, FILM COATED ORAL at 08:57

## 2019-05-08 RX ADMIN — POLYETHYLENE GLYCOL 3350 17 G: 17 POWDER, FOR SOLUTION ORAL at 17:42

## 2019-05-08 RX ADMIN — SODIUM CHLORIDE 500 ML: 9 INJECTION, SOLUTION INTRAVENOUS at 15:17

## 2019-05-08 RX ADMIN — LEVALBUTEROL HYDROCHLORIDE 1.25 MG: 1.25 SOLUTION, CONCENTRATE RESPIRATORY (INHALATION) at 20:33

## 2019-05-08 RX ADMIN — LEVALBUTEROL HYDROCHLORIDE 1.25 MG: 1.25 SOLUTION, CONCENTRATE RESPIRATORY (INHALATION) at 15:34

## 2019-05-08 RX ADMIN — LEVALBUTEROL HYDROCHLORIDE 1.25 MG: 1.25 SOLUTION, CONCENTRATE RESPIRATORY (INHALATION) at 02:00

## 2019-05-08 RX ADMIN — HEPARIN SODIUM 1150 UNITS/HR: 10000 INJECTION, SOLUTION INTRAVENOUS at 09:04

## 2019-05-08 RX ADMIN — LEVALBUTEROL HYDROCHLORIDE 1.25 MG: 1.25 SOLUTION, CONCENTRATE RESPIRATORY (INHALATION) at 11:48

## 2019-05-08 ASSESSMENT — ACTIVITIES OF DAILY LIVING (ADL)
FALL_HISTORY_WITHIN_LAST_SIX_MONTHS: NO
DRESS: 0-->INDEPENDENT
RETIRED_EATING: 0-->INDEPENDENT
ADLS_ACUITY_SCORE: 17
RETIRED_COMMUNICATION: 0-->UNDERSTANDS/COMMUNICATES WITHOUT DIFFICULTY
ADLS_ACUITY_SCORE: 20
AMBULATION: 0-->INDEPENDENT
COGNITION: 0 - NO COGNITION ISSUES REPORTED
ADLS_ACUITY_SCORE: 20
WHICH_OF_THE_ABOVE_FUNCTIONAL_RISKS_HAD_A_RECENT_ONSET_OR_CHANGE?: AMBULATION
BATHING: 0-->INDEPENDENT
TRANSFERRING: 0-->INDEPENDENT
ADLS_ACUITY_SCORE: 21
ADLS_ACUITY_SCORE: 21
TOILETING: 0-->INDEPENDENT
SWALLOWING: 0-->SWALLOWS FOODS/LIQUIDS WITHOUT DIFFICULTY
ADLS_ACUITY_SCORE: 17

## 2019-05-08 ASSESSMENT — MIFFLIN-ST. JEOR: SCORE: 1466.88

## 2019-05-08 NOTE — PLAN OF CARE
VSS on 10L oximyzer. Tele: A-fib 's, 140+ upon exertion. Hep gtt @ 10mL/hr, and Diltiazem gtt @ 15mL/hr. A/Ox4. CMS intact, pulses palpable, +1-2 generalized edema. Pt denies pain. Up with A1 to commode. Tolerating regular diet. Denies N&V. +gas, -bm. Voiding adequately, on oral lasix. LS extremely diminished in upper lobes and course in lower. Pt refused BiPAP she stated that it makes her feel claustrophobic. Pt requested home CPAP, respiratory applied it at bedtime.  Will continue to monitor.

## 2019-05-08 NOTE — PLAN OF CARE
Discharge Planner PT   Patient plan for discharge: return to daughter's home in [place] with family support  Current status: PT consult received. Chart reviewed. PT evaluation and treatment initiated.  80y/o F admitted with COPD exacerbation. 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 PLOF: typically IND with household mobility. Utilizes a cane intermittent for balance outside of her home and walker for longer distances. Flight of stairs into daughters home, then all needs on main level.     Pt on IMC status. 7L oxymizer. SPO2=92-93%. Pt reports just amb with nursing staff with assist of 1, declining OOB at this time. Pt educated on bilateral LE supine ex and encouraged to complete ex IND in her room (wrote ex on board in room). Will place FWW in her room for amb to assist with energy conservation. Pt encouraged up the the chair 2x/day.   Barriers to return to prior living situation: stairs to enter home, resp status  Recommendations for discharge: home with family support. Home PT  Rationale for recommendations: Anticipate pt's mobility will improve with continued PT and medical management to return to daughter's home. Pt has good family support and reports someone will always be with her. Recommend home PT at discharge (anticipate will qualify as she requires assist of person and assistive device to leave her home.        Entered by: Sammi Penaloza 05/08/2019 2:19 PM

## 2019-05-08 NOTE — PROGRESS NOTES
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 Pt currently utilizing BiPAP, remainder of vitals WNL. Pt currently stable.      COPD exacerbation  Seasonal allergies: Note acute, worsening of SOB in the past 24-48 hours. Hx of COPD exacerbation treated outpatient with steroids about 1 month ago. Notes seasonal allergies, recent flare, with associated nasal congestion, worsening cough. Afebrile. No recent sick exposures. No associated chest pain. Utilizing home inhalers more frequently. Increased oxygen needs; baseline uses CPAP with 2 LPM at bedtime, but has been using 2 LPM continuously at home. CBC, BMP WNL. . Procal 0.06. CXR with noted patchy opacities in the right mid and lower lung with more focal opacity in the SARA. Pt had echocardiogram 5/2 with EF 68%, no RWMA, RVSP 46 mmHg. Received Solumedrol 125 mg X1, Magnesium 2 g, Duoneb X1 in the ED. Given the above, will treat for COPD exacerbation; since I cannot confirm that infiltrates on CXR aren't purely river, will cover pt with antibiotics for CAP. Also note elevated RVSP, thus some degree of pulmonary HTN may be contributing to above.   [PTA Combivent Respimat 200-100 mcg/ACT inhaler, Albuterol inhaler PRN]  -- Continue [facility] status   -- BiPAP was transitioned from constant to PRN, Respiratory following; OK now to give oral meds. Ween as tolerated   -- Telemetry, continuous pulse oximetry  -- Continue Solumedrol 62.5 mg IV BID   -- Xopenex nebs q4 hours while awake   -- IV Levaquin (PCN allergy noted)   -- RCAT consulted,  encourage pulmonary toilet with IS and acapella   -- CBC and BMP in the AM    -- Consider CT chest in the w/o contrast (note iodinated contrast allergy, also on AC at this point) if worsening and will have to get pulmonary involved   -- Will go ahead and get pulmonary involved as not improving. Will see if Pulmonary wants to repeat CT or not; they report a CT from [place] last Monday but the CT found in Care[facility]Everywhere is from April 16th, 2019.     Adenocarcinoma of the lung: Follows with Dr. Gonsalez through Oncology at [facility]. Last visit 4/26/18. Most recent CT chest from 4/19/19 with significantly increased size of right upper lobe perihilar mass narrowing the right upper lobe bronchus, increased size of left upper lobe masslike density, new small to moderate size right pleural effusion, slight increase size subcarinal lymph nodes worrisome for metastatic disease. Pt completed radiation treatment (November), now started palliative chemotherapy with first round two days prior to admission.  -- Px still hypoxic and not improving, ? Contribution from [place]. Will have Pulmonary officially consulted.  -- Pt was scheduled for repeat PFTs 5/6 at [place], will need to reschedule appointment      Atrial fibrillation, rate controlled   Chronic anticoagulation use:   -- Restart PTA Eliquis 5 mg po BID   -- Continue Cartia  mg po every day, Lopressor 100 mg po BID   -- Telemetry      HTN, hyperlipidemia       CKD III  Hx of partial right nephrectomy: Baseline creatinine ~1. Partial right nephrectomy in the setting of kidney stones. Px is on PTA Lasix which was restarted but creatinine bumped up today  -- Give back 500 mls  -- Discontinue Lasix  -- Recheck in AM  -- Avoid nephrotoxic agents   -- Monitor renal function     Tobacco use disorder, history off: Previously smoked 1 ppd X~40 years, quit 7 years ago.      Hx of pneumothorax: This was following lung bx in 3/2019.         Diet: Regular Diet Adult    DVT  Prophylaxis: On full dose chemoprophylaxis  Medina Catheter: not present  Code Status: Full Code      Disposition Plan   Expected discharge: 4 - 7 days, recommended to prior living arrangement once Hypoxia figured out.  Entered: Kunal Mcleod MD 05/08/2019, 2:32 PM       The patient's care was discussed with the Patient and Patient's Family.    Kunal Mcleod MD  Hospitalist Service  [facility]    ______________________________________________________________________    Interval History   Px not feeling better today.  Pulmonary consult placed.      Data reviewed today: I reviewed all medications, new labs and imaging results over the last 24 hours. I personally reviewed no images or EKG's today.    Physical Exam   Vital Signs: Temp: 98.8  F (37.1  C) Temp src: Oral BP: 139/74 Pulse: 87 Heart Rate: 80 Resp: 22 SpO2: 93 % O2 Device: Oxymizer cannula Oxygen Delivery: 7 LPM  Weight: 217 lbs 5.98 oz    CONSTITUTIONAL: Pt laying in bed, a continues tachypnic.  Cooperative with interview. Accompanied by daughters at bedside.   HEENT: Normocephalic, atraumatic. Negative for conjunctival redness or scleral icterus.  Oral mucosa pink and moist; negative for ulcerations, erythema, or exudates.  Dentition in good repair.   CARDIOVASCULAR: Irregularly irregular rhythm, rate controlled. No murmurs, rubs, or extra heart sounds appreciated. Pulses +2/4 and irregular in upper and lower extremities, bilaterally.   RESPIRATORY: work of breathing as above.  Supplemental oxygenation via NC. Course rhonchi throughout all fields.   GASTROINTESTINAL:  Abdomen soft, non-distended. BS auscultated in all four quadrants. Negative for tenderness to  palpation.  No masses or organomegaly noted.  MUSCULOSKELETAL: No gross deformities noted. Normal muscle tone.   HEMATOLOGIC/LYMPHATIC/IMMUNOLOGIC: Negative for lower extremity edema, bilaterally.  NEUROLOGIC: Alert and oriented to person, place, and time.  strength  intact. No focal neuro deficits appreciated.   SKIN: Warm, dry, intact. No jaundice noted. Negative for suspicious lesions, rashes, bruising, open sores or abrasions.          Data   Recent Labs   Lab 05/08/19  0530 05/07/19  0714 05/06/19  1235 05/06/19  0800   WBC  --  4.4 8.3 7.9   HGB  --  10.6* 11.4* 11.3*   MCV  --  95 96 96   PLT  --  177 175 181    138  --  138   POTASSIUM 4.4 4.4  --  3.7   CHLORIDE 103 104  --  102   CO2 31 31  --  31   BUN 47* 25  --  29   CR 1.16* 0.81  --  0.94   ANIONGAP 6 3  --  5   MELANIE 9.5 9.3  --  9.0   * 238*  --  158*     No results found for this or any previous visit (from the past 24 hour(s)).

## 2019-05-08 NOTE — PLAN OF CARE
OT orders received, chart reviewed and contact made with patient to initiate assessment. Patient pleasant however initially declining therapies stating she had a plan in place with her [place] team (where she is being followed for chemo tx) for therapies and not wanting to participation during hospitalization. After much discussion during which OT explained purpose and goals of inpatient therapies patient did agree to work with Physical Therapy as she is concerned with losing strength and will work with them if it allows her to get up to walk a couple of times a day. Declines OT, therefore OT will sign off and complete order and defer discharge recommendations to PT.

## 2019-05-08 NOTE — PROGRESS NOTES
05/08/19 1345   Quick Adds   Type of Visit Initial PT Evaluation   Living Environment   Lives With alone   Living Environment Comment pt currently staying with her daughter in [place]. Flight of stairs into her daughter's home then all needs on main level. Pt typically lives alone in [place], [place]   Self-Care   Usual Activity Tolerance good   Current Activity Tolerance poor   Equipment Currently Used at Home cane, straight;walker, rolling   Functional Level Prior   Ambulation 0-->independent  (no AD inside of home)   Transferring 0-->independent   Toileting 0-->independent   Bathing 0-->independent   Communication 0-->understands/communicates without difficulty   Swallowing 0-->swallows foods/liquids without difficulty   Cognition 0 - no cognition issues reported   Fall history within last six months no   Which of the above functional risks had a recent onset or change? ambulation;transferring   General Information   Onset of Illness/Injury or Date of Surgery - Date 05/06/19   Referring Physician Margret Jackson PA-C   Patient/Family Goals Statement to return to daughter's home   Pertinent History of Current Problem (include personal factors and/or comorbidities that impact the POC)  80y/o F admitted with COPD exacerbation. 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   Precautions/Limitations oxygen therapy device and L/min;fall precautions  (7L oxyimizer)   General Observations pt with labored breathing at rest. SPO2=93% on 7L   General Info Comments Activity: up with assist   Cognitive Status Examination   Orientation orientation to person, place and time   Level of Consciousness alert   Follows Commands and Answers Questions 100% of the time   Personal Safety and Judgment intact   Pain Assessment   Patient Currently in Pain No   Range of Motion (ROM)   ROM Quick Adds No  "deficits were identified   Strength   Strength Comments bilateral UE/LE grossly 4+/5. Noted pt reports global weakness with mobility   Bed Mobility   Bed Mobility Comments not formally tested, however pt reports min A with nursing   Transfer Skills   Transfer Comments not formally tested, however pt reports min A with nursing   Gait   Gait Comments not formally tested.-pt just up with nursing  1/2 way down the franco with IV support and min A   Balance   Balance Comments NT   Sensory Examination   Sensory Perception Comments pt denies numbness/tingling   Muscle Tone   Muscle Tone no deficits were identified   General Therapy Interventions   Planned Therapy Interventions balance training;bed mobility training;gait training;transfer training;strengthening   Clinical Impression   Criteria for Skilled Therapeutic Intervention yes, treatment indicated   PT Diagnosis impaired gait   Influenced by the following impairments impaired strength, RODRIGUEZ, impaired activity tolerance   Functional limitations due to impairments impaired functional mobility    Clinical Presentation Evolving/Changing   Clinical Presentation Rationale based on resp status, clinical presentation   Clinical Decision Making (Complexity) Moderate complexity   Therapy Frequency` daily   Predicted Duration of Therapy Intervention (days/wks) 1 week   Anticipated Equipment Needs at Discharge   (TBD)   Anticipated Discharge Disposition Home with Assist;Home with Home Therapy   Risk & Benefits of therapy have been explained Yes   Patient, Family & other staff in agreement with plan of care Yes   [facility] [employer] TM \"6 Clicks\"   2016, Trustees of [facility], under license to [employer].  All rights reserved.   6 Clicks Short Forms Basic Mobility Inpatient Short Form   [facility] [employer]  \"6 Clicks\" V.2 Basic Mobility Inpatient Short Form   1. Turning from your back to your side while in a flat bed without using bedrails? 3 - A Little   2. " Moving from lying on your back to sitting on the side of a flat bed without using bedrails? 3 - A Little   3. Moving to and from a bed to a chair (including a wheelchair)? 3 - A Little   4. Standing up from a chair using your arms (e.g., wheelchair, or bedside chair)? 3 - A Little   5. To walk in hospital room? 3 - A Little   6. Climbing 3-5 steps with a railing? 2 - A Lot   Basic Mobility Raw Score (Score out of 24.Lower scores equate to lower levels of function) 17   Total Evaluation Time   Total Evaluation Time (Minutes) 15

## 2019-05-08 NOTE — PROGRESS NOTES
The patient was on her home BiPAP machine t/o most of the shift. SpO2 was 97%. BS Diminished t/o with faint expiratory wheezes. The patient was given a sage treatment at 2 am per nursing.Will continue to follow.

## 2019-05-08 NOTE — PLAN OF CARE
A&Ox4, with a period of confusion/hallucination overnight.  AVSS, home CPAP with O2 overnight otherwise oxymizer at 10L/NC.  Afib CVR, dropped to high 50's few times at beginning of shift while doing bedside hand-off, diltiazem gtt titrated down to 10ml/hr.  Hep gtt increased to 11.5ml/hr and bolus given, PTT rechecked at 1230.  Denies pain.  LS dim, occasional exp wheezing with activity.  Scheduled neb for 2300 missed as pt was asleep per RT, called RT at 0130 and asked if she can get the neb at this time.  BS active, passing gas.  BLE edema trace.  Up with 1A to bathroom, voiding w/o difficulty.  Slept in between cares.

## 2019-05-08 NOTE — CONSULTS
Pulmonary Medicine Consultation      Date of Admission: 5/6/2019  Primary Attending:  Kunal Mcleod MD  Consulting Physician: Arpan Donahue MD    History:      Carine Rodriguez is a 79 year old female with PMHx of adenocarcinoma of the right and left lung s/p radiation (completed in November) now on palliative chemotherapy (first round 2 days ago), COPD, hx of tobacco use, atrial fibrillation on chronic anticoagulation with Eliquis, CKD IIII, and HTN admitted on 5/6/2019 for suspicion of COPD exacerbation. Pt currently utilizing BiPAP, remainder of vitals WNL. Pt currently stable.             Review of Systems - A 10-system ROS is negative except for items mentioned above and in HPI.       Prior medical history:  Past Medical History:   Diagnosis Date     Cancer (H)      COPD (chronic obstructive pulmonary disease) (H)      Hypertension        Past Surgical History:   Procedure Laterality Date     BIOPSY         Patient Active Problem List   Diagnosis     COPD exacerbation (H)       Social History     Social History     Socioeconomic History     Marital status: Single     Spouse name: Not on file     Number of children: Not on file     Years of education: Not on file     Highest education level: Not on file   Occupational History     Not on file   Social Needs     Financial resource strain: Not on file     Food insecurity:     Worry: Not on file     Inability: Not on file     Transportation needs:     Medical: Not on file     Non-medical: Not on file   Tobacco Use     Smoking status: Not on file   Substance and Sexual Activity     Alcohol use: Not on file     Drug use: Not on file     Sexual activity: Not on file   Lifestyle     Physical activity:     Days per week: Not on file     Minutes per session: Not on file     Stress: Not on file   Relationships     Social connections:     Talks on phone: Not on file     Gets together: Not on file     Attends Christian service: Not on file     Active member of club or  organization: Not on file     Attends meetings of clubs or organizations: Not on file     Relationship status: Not on file     Intimate partner violence:     Fear of current or ex partner: Not on file     Emotionally abused: Not on file     Physically abused: Not on file     Forced sexual activity: Not on file   Other Topics Concern     Not on file   Social History Narrative     Not on file         Family History  No family history on file.      Medications  No current outpatient medications on file.     Current Facility-Administered Medications Ordered in Epic   Medication Dose Route Frequency Last Rate Last Dose     0.9% sodium chloride BOLUS  500 mL Intravenous Once 166.7 mL/hr at 05/08/19 1517 500 mL at 05/08/19 1517     acetaminophen (TYLENOL) tablet 500-1,000 mg  500-1,000 mg Oral Q6H PRN   1,000 mg at 05/07/19 2113     albuterol (PROAIR HFA/PROVENTIL HFA/VENTOLIN HFA) 108 (90 Base) MCG/ACT inhaler 2 puff  2 puff Inhalation Q4H PRN   2 puff at 05/08/19 0325     apixaban ANTICOAGULANT (ELIQUIS) tablet 5 mg  5 mg Oral BID         bisacodyl (DULCOLAX) Suppository 10 mg  10 mg Rectal Daily PRN         diltiazem (CARDIZEM) 125 mg in sodium chloride 0.9 % 125 mL infusion  5-15 mg/hr Intravenous Continuous 10 mL/hr at 05/08/19 0318 10 mg/hr at 05/08/19 0318     diltiazem ER (DILT-XR) 300 mg  300 mg Oral Daily   300 mg at 05/08/19 0857     heparin infusion 25,000 units in 0.45% NaCl 250 mL  1,050 Units/hr Intravenous Continuous 10.5 mL/hr at 05/08/19 1517 1,050 Units/hr at 05/08/19 1517     hypromellose-dextran (ARTIFICAL TEARS) 0.1-0.3 % ophthalmic solution 1 drop  1 drop Both Eyes Q1H PRN         levalbuterol (XOPENEX CONC) neb solution 1.25 mg  1.25 mg Nebulization Q4H While awake   1.25 mg at 05/08/19 1534     levofloxacin (LEVAQUIN) infusion 500 mg  500 mg Intravenous Q24H 100 mL/hr at 05/08/19 1157 500 mg at 05/08/19 1157     lidocaine (LMX4) cream   Topical Q1H PRN         lidocaine 1 % 0.1-1 mL  0.1-1 mL Other  Q1H PRN         melatonin tablet 1 mg  1 mg Oral At Bedtime PRN         methylPREDNISolone sodium succinate (solu-MEDROL) injection 62.5 mg  62.5 mg Intravenous Q12H   62.5 mg at 05/08/19 0857     metoprolol tartrate (LOPRESSOR) tablet 100 mg  100 mg Oral BID   100 mg at 05/08/19 0857     naloxone (NARCAN) injection 0.1-0.4 mg  0.1-0.4 mg Intravenous Q2 Min PRN         nitroGLYcerin (NITROSTAT) sublingual tablet 0.4 mg  0.4 mg Sublingual Q5 Min PRN         No anticoagulants IF patient has had acute trauma/surgery or recent intracranial, GI or urinary tract bleeding.    Other DOES NOT GO TO MAR         No lozenges or gum should be given while patient on BIPAP/AVAPS/AVAPS AE   Does not apply Continuous PRN         ondansetron (ZOFRAN-ODT) ODT tab 4 mg  4 mg Oral Q6H PRN        Or     ondansetron (ZOFRAN) injection 4 mg  4 mg Intravenous Q6H PRN         polyethylene glycol (MIRALAX/GLYCOLAX) Packet 17 g  17 g Oral BID PRN         prochlorperazine (COMPAZINE) injection 5 mg  5 mg Intravenous Q6H PRN        Or     prochlorperazine (COMPAZINE) tablet 5 mg  5 mg Oral Q6H PRN        Or     prochlorperazine (COMPAZINE) Suppository 12.5 mg  12.5 mg Rectal Q12H PRN         senna-docusate (SENOKOT-S/PERICOLACE) 8.6-50 MG per tablet 1 tablet  1 tablet Oral BID PRN        Or     senna-docusate (SENOKOT-S/PERICOLACE) 8.6-50 MG per tablet 2 tablet  2 tablet Oral BID PRN         sodium chloride (PF) 0.9% PF flush 3 mL  3 mL Intracatheter q1 min prn         sodium chloride (PF) 0.9% PF flush 3 mL  3 mL Intracatheter Q8H   3 mL at 05/06/19 1221     No current Epic-ordered outpatient medications on file.       Allergies   Allergen Reactions     Lisinopril      Anaphylaxis. Tongue swelling.      Contrast Dye      Iodinated contrast, oral and IV, rash     Amlodipine      Per Care Everywhere [facility]       Bupropion      Per CareEverywhere [facility]       Clarithromycin      Hives, 2010. Per CareEverywhere [facility]       Indomethacin      Propranolol       Penicillins Rash     Sulfa Drugs Rash         Physical Examination:   Vitals:    19 1000 19 1105 19 1200 19 1558   BP: 115/83  127/54 130/63   BP Location:    Left arm   Pulse: 80   87   Resp: (!) 31  22 20   Temp:  98.8  F (37.1  C)  98.2  F (36.8  C)   TempSrc:  Oral  Oral   SpO2: 96%  96% 93%   Weight:       Height:         Body mass index is 36.17 kg/m .  Temp (24hrs), Av.5  F (36.9  C), Min:97.8  F (36.6  C), Max:99  F (37.2  C)        Constitutional:  Appears comfortable.  HENT:  mucous membranes moist.  Eyes: PERRLA, no icterus, no pallor.   Neck: No lymphadenopathy or thyromegaly, trachea midline, no carotid bruits.  Cardiovascular: Regular rate and rythym, no murmurs, rubs or gallops, no peripheral edema.  Respiratory/Chest: No use of accessory muscle, diminished BS throughout  Gastrointestinal: Abdomen was soft, non-tender, non-distended, no masses felt, no hepatosplenomegaly.  Musculoskeletal: No clubbing or cyanosis, full range of motion in all extremities.  Neurological: No focal motor or sensory deficits. DTR's are 2+ and symmetric.  Skin: No skin rash, hives, petechiae, or breakdown.      CMP  Recent Labs   Lab 19  0530 19  0714 19  0800    138 138   POTASSIUM 4.4 4.4 3.7   CHLORIDE 103 104 102   CO2 31 31 31   ANIONGAP 6 3 5   * 238* 158*   BUN 47* 25 29   CR 1.16* 0.81 0.94   GFRESTIMATED 45* 69 58*   GFRESTBLACK 52* 80 67   MELANIE 9.5 9.3 9.0     CBC  Recent Labs   Lab 19  0714 19  1235 19  0800   WBC 4.4 8.3 7.9   RBC 3.64* 3.88 3.80   HGB 10.6* 11.4* 11.3*   HCT 34.5* 37.1 36.3   MCV 95 96 96   MCH 29.1 29.4 29.7   MCHC 30.7* 30.7* 31.1*   RDW 15.6* 16.0* 16.0*    175 181     INRNo lab results found in last 7 days.  Arterial Blood Gas  Recent Labs   Lab 19  1118   O2PER 60%     No results for input(s): CULT in the last 168 hours.    Diagnostic Studies:  Chest Radiology:       CT Chest at [facility]  4/16  IMPRESSION:  1. Significantly increased size right upper lobe perihilar mass narrowing the  right upper lobe bronchus worrisome for malignancy.  Bronchoscopy to be  performed as needed.  2. Increased size left upper lobe masslike density worrisome for malignancy.  3. New small to moderate size right pleural effusion.  4. Slight increase size subcarinal lymph nodes worrisome for metastatic disease.    CXR  IMPRESSION: Patchy opacities are noted in the right mid and lower  lung, with a more focal opacity noted in the left upper lung. Findings  are nonspecific, and could be related to pneumonia, however underlying  malignancy cannot be excluded. Consider chest CT for further  characterization. There is a small right pleural effusion. Cardiac  silhouette is partially obscured, however heart size appears to be  near the upper limits of normal. Pulmonary vascularity is within  normal limits where seen. Aortic calcification.    Assessment/Plan:     Impression  -Acute hypoxic respiratory failure  -Metastatic Lung cancer  -COPD    Recommendations  -obtain CT chest w/o contrast  -cont steroids and levaquin  -continue nebulizers  -ECHO showing some signs suggestive of Pulm HTN 2/2 to Group III likely  -suggest diuresis  -wean O2 to keep above 90% only    Thank you for the consult, will continue to follow along. Please call with any questions.      Arpan Donahue M.D.  Pulmonary, Critical Care and Sleep Medicine  [facility]   Pager: [phone]  Office:[phone]

## 2019-05-09 ENCOUNTER — APPOINTMENT (OUTPATIENT)
Dept: CT IMAGING | Facility: CLINIC | Age: 79
DRG: 189 | End: 2019-05-09
Attending: HOSPITALIST
Payer: MEDICARE

## 2019-05-09 LAB
ANION GAP SERPL CALCULATED.3IONS-SCNC: 3 MMOL/L (ref 3–14)
APTT PPP: 27 SEC (ref 22–37)
BASE EXCESS BLDA CALC-SCNC: 4.2 MMOL/L
BASOPHILS # BLD AUTO: 0 10E9/L (ref 0–0.2)
BASOPHILS NFR BLD AUTO: 0 %
BUN SERPL-MCNC: 57 MG/DL (ref 7–30)
CALCIUM SERPL-MCNC: 9.2 MG/DL (ref 8.5–10.1)
CHLORIDE SERPL-SCNC: 106 MMOL/L (ref 94–109)
CO2 SERPL-SCNC: 33 MMOL/L (ref 20–32)
CREAT SERPL-MCNC: 1.08 MG/DL (ref 0.52–1.04)
DIFFERENTIAL METHOD BLD: ABNORMAL
EOSINOPHIL # BLD AUTO: 0 10E9/L (ref 0–0.7)
EOSINOPHIL NFR BLD AUTO: 0 %
ERYTHROCYTE [DISTWIDTH] IN BLOOD BY AUTOMATED COUNT: 16.1 % (ref 10–15)
GFR SERPL CREATININE-BSD FRML MDRD: 49 ML/MIN/{1.73_M2}
GLUCOSE SERPL-MCNC: 282 MG/DL (ref 70–99)
HCO3 BLD-SCNC: 33 MMOL/L (ref 21–28)
HCT VFR BLD AUTO: 35.6 % (ref 35–47)
HGB BLD-MCNC: 10.9 G/DL (ref 11.7–15.7)
IMM GRANULOCYTES # BLD: 0 10E9/L (ref 0–0.4)
IMM GRANULOCYTES NFR BLD: 0.2 %
INR PPP: 1.04 (ref 0.86–1.14)
LACTATE BLD-SCNC: 1 MMOL/L (ref 0.7–2)
LDH SERPL L TO P-CCNC: 200 U/L (ref 81–234)
LMWH PPP CHRO-ACNC: >2 IU/ML
LYMPHOCYTES # BLD AUTO: 0.1 10E9/L (ref 0.8–5.3)
LYMPHOCYTES NFR BLD AUTO: 1.3 %
MCH RBC QN AUTO: 29.4 PG (ref 26.5–33)
MCHC RBC AUTO-ENTMCNC: 30.6 G/DL (ref 31.5–36.5)
MCV RBC AUTO: 96 FL (ref 78–100)
MONOCYTES # BLD AUTO: 0 10E9/L (ref 0–1.3)
MONOCYTES NFR BLD AUTO: 0.1 %
NEUTROPHILS # BLD AUTO: 8.8 10E9/L (ref 1.6–8.3)
NEUTROPHILS NFR BLD AUTO: 98.4 %
NRBC # BLD AUTO: 0 10*3/UL
NRBC BLD AUTO-RTO: 0 /100
O2/TOTAL GAS SETTING VFR VENT: ABNORMAL %
OXYHGB MFR BLD: 92 % (ref 92–100)
PCO2 BLD: 71 MM HG (ref 35–45)
PH BLD: 7.27 PH (ref 7.35–7.45)
PLATELET # BLD AUTO: 207 10E9/L (ref 150–450)
PO2 BLD: 75 MM HG (ref 80–105)
POTASSIUM SERPL-SCNC: 5.1 MMOL/L (ref 3.4–5.3)
PROT SERPL-MCNC: 6.7 G/DL (ref 6.8–8.8)
RBC # BLD AUTO: 3.71 10E12/L (ref 3.8–5.2)
SODIUM SERPL-SCNC: 142 MMOL/L (ref 133–144)
WBC # BLD AUTO: 8.9 10E9/L (ref 4–11)

## 2019-05-09 PROCEDURE — 83615 LACTATE (LD) (LDH) ENZYME: CPT | Performed by: HOSPITALIST

## 2019-05-09 PROCEDURE — 85730 THROMBOPLASTIN TIME PARTIAL: CPT | Performed by: HOSPITALIST

## 2019-05-09 PROCEDURE — 84155 ASSAY OF PROTEIN SERUM: CPT | Performed by: HOSPITALIST

## 2019-05-09 PROCEDURE — 94640 AIRWAY INHALATION TREATMENT: CPT

## 2019-05-09 PROCEDURE — A9270 NON-COVERED ITEM OR SERVICE: HCPCS | Performed by: HOSPITALIST

## 2019-05-09 PROCEDURE — 82805 BLOOD GASES W/O2 SATURATION: CPT | Performed by: HOSPITALIST

## 2019-05-09 PROCEDURE — 85025 COMPLETE CBC W/AUTO DIFF WBC: CPT | Performed by: HOSPITALIST

## 2019-05-09 PROCEDURE — 36415 COLL VENOUS BLD VENIPUNCTURE: CPT | Performed by: HOSPITALIST

## 2019-05-09 PROCEDURE — 85520 HEPARIN ASSAY: CPT | Performed by: HOSPITALIST

## 2019-05-09 PROCEDURE — 25000125 ZZHC RX 250: Performed by: PHYSICIAN ASSISTANT

## 2019-05-09 PROCEDURE — 40000275 ZZH STATISTIC RCP TIME EA 10 MIN

## 2019-05-09 PROCEDURE — 94660 CPAP INITIATION&MGMT: CPT

## 2019-05-09 PROCEDURE — 83605 ASSAY OF LACTIC ACID: CPT | Performed by: HOSPITALIST

## 2019-05-09 PROCEDURE — 25000128 H RX IP 250 OP 636: Performed by: PHYSICIAN ASSISTANT

## 2019-05-09 PROCEDURE — 12000000 ZZH R&B MED SURG/OB

## 2019-05-09 PROCEDURE — 71250 CT THORAX DX C-: CPT

## 2019-05-09 PROCEDURE — 94640 AIRWAY INHALATION TREATMENT: CPT | Mod: 76

## 2019-05-09 PROCEDURE — 80048 BASIC METABOLIC PNL TOTAL CA: CPT | Performed by: HOSPITALIST

## 2019-05-09 PROCEDURE — 25000132 ZZH RX MED GY IP 250 OP 250 PS 637: Performed by: HOSPITALIST

## 2019-05-09 PROCEDURE — 99232 SBSQ HOSP IP/OBS MODERATE 35: CPT | Performed by: HOSPITALIST

## 2019-05-09 PROCEDURE — 36600 WITHDRAWAL OF ARTERIAL BLOOD: CPT

## 2019-05-09 PROCEDURE — 85610 PROTHROMBIN TIME: CPT | Performed by: HOSPITALIST

## 2019-05-09 RX ORDER — FUROSEMIDE 20 MG
20 TABLET ORAL
Status: DISCONTINUED | OUTPATIENT
Start: 2019-05-09 | End: 2019-05-11

## 2019-05-09 RX ADMIN — FUROSEMIDE 20 MG: 20 TABLET ORAL at 12:26

## 2019-05-09 RX ADMIN — LEVALBUTEROL HYDROCHLORIDE 1.25 MG: 1.25 SOLUTION, CONCENTRATE RESPIRATORY (INHALATION) at 11:06

## 2019-05-09 RX ADMIN — LEVALBUTEROL HYDROCHLORIDE 1.25 MG: 1.25 SOLUTION, CONCENTRATE RESPIRATORY (INHALATION) at 07:22

## 2019-05-09 RX ADMIN — LEVOFLOXACIN 500 MG: 5 INJECTION, SOLUTION INTRAVENOUS at 12:18

## 2019-05-09 RX ADMIN — LEVALBUTEROL HYDROCHLORIDE 1.25 MG: 1.25 SOLUTION, CONCENTRATE RESPIRATORY (INHALATION) at 20:10

## 2019-05-09 RX ADMIN — METHYLPREDNISOLONE SODIUM SUCCINATE 62.5 MG: 125 INJECTION, POWDER, FOR SOLUTION INTRAMUSCULAR; INTRAVENOUS at 12:18

## 2019-05-09 RX ADMIN — METHYLPREDNISOLONE SODIUM SUCCINATE 62.5 MG: 125 INJECTION, POWDER, FOR SOLUTION INTRAMUSCULAR; INTRAVENOUS at 20:51

## 2019-05-09 RX ADMIN — LEVALBUTEROL HYDROCHLORIDE 1.25 MG: 1.25 SOLUTION, CONCENTRATE RESPIRATORY (INHALATION) at 16:07

## 2019-05-09 RX ADMIN — METOPROLOL TARTRATE 100 MG: 100 TABLET, FILM COATED ORAL at 12:18

## 2019-05-09 RX ADMIN — DILTIAZEM HYDROCHLORIDE 300 MG: 180 CAPSULE, EXTENDED RELEASE ORAL at 12:26

## 2019-05-09 RX ADMIN — FUROSEMIDE 20 MG: 20 TABLET ORAL at 18:29

## 2019-05-09 RX ADMIN — LEVALBUTEROL HYDROCHLORIDE 1.25 MG: 1.25 SOLUTION, CONCENTRATE RESPIRATORY (INHALATION) at 23:15

## 2019-05-09 RX ADMIN — METOPROLOL TARTRATE 100 MG: 100 TABLET, FILM COATED ORAL at 20:52

## 2019-05-09 ASSESSMENT — ACTIVITIES OF DAILY LIVING (ADL)
ADLS_ACUITY_SCORE: 17
ADLS_ACUITY_SCORE: 15

## 2019-05-09 ASSESSMENT — MIFFLIN-ST. JEOR: SCORE: 1454.88

## 2019-05-09 NOTE — PROGRESS NOTES
"SPIRITUAL HEALTH SERVICES  Spiritual Assessment Progress Note  [facility]   met with pt and her two daughters. Daughter talked about pt's long health hx including COPD, a fib, and lung cancer.  Pt is a retired RN and appears to accept all that she is going through but Pt said, \"If I would have known all that I would go through, I might have made different decisions.  Pt described herself as being numb most of the time and struggles with breath.    Pt is not a Gnosticist goer (raised Sikhism) but has a personal and private spirituality - per daughter.     Pt accepted offer for a prayer.  listened and provided encouragement.     continues to be available for support as needed.                                                                                                                                            Gisell Khan M.Div., [facility]  Staff    Pager [phone]  "

## 2019-05-09 NOTE — PROVIDER NOTIFICATION
MD Notification    Notified Person: MD    Notified Person Name: Harsha    Notification Date/Time: May 9, 2019 10:04 AM    Notification Interaction: paged    Purpose of Notification: FYI: ABG back. RT put pt on BiPap. CT completed.     Orders Received:    Comments:

## 2019-05-09 NOTE — PROGRESS NOTES
Patient was off her BiPAP for most of the shift. I placed patient back on her BiPAP at 04:15. BS diminished t/o. Patient refused 11:00 p.m. Treatment.

## 2019-05-09 NOTE — PROVIDER NOTIFICATION
MD Notification    Notified Person: MD    Notified Person Name: Harsha    Notification Date/Time: May 9, 2019 7:52 AM    Notification Interaction: face to face    Purpose of Notification: > 2 Hep 10A    Orders Received: none    Comments:

## 2019-05-09 NOTE — PROGRESS NOTES
Jamaal's test performed prior to radial ABG draw. Collateral circulation confirmed.  Site:left radial  Device:Home CPAP unit with 8L oxygen bled in    Senait Wilks RRT

## 2019-05-09 NOTE — PLAN OF CARE
PT: Will defer PT today due to resp status. Discussed with RN, pt currently back on Bipap. Has been getting up to the chair with nursing. Pt  was educated on ex yesterday. Per chart, plan for thoracentesis.

## 2019-05-09 NOTE — PLAN OF CARE
Pt A/O x 4. No complaints of pain. Placed on BiPap this am after ABG. Improved this evening. On 6L oxymizer canula when off Bipap. Poor appetite. To wear Bipap overnight. Voiding in the bedside commode. Chest CT completed. Right pleural effusion found, thoracentesis to be performed tomorrow. Up with one to the BSC. Daughters at the bedside. Will continue to monitor

## 2019-05-09 NOTE — CONSULTS
Consult noted. Patient currently resting on bipap. Care Transitions will continue to follow for discharge planning.

## 2019-05-09 NOTE — PLAN OF CARE
Patient more confused throughout night. Pulling at oxygen multiple times. Redirected frequently. Sating 90-92% on 6-7L oxymizer. Trying to keep patient on CPAP overnight. Drops to low 70s on RA. SOB with activity. Somewhat anxious overnight. LS coarse. Tele Afib, chronic for pt. BS active, passing flatus. Up with Assist of 1 & walker. Regular diet.

## 2019-05-09 NOTE — PROGRESS NOTES
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 Pt currently utilizing BiPAP, remainder of vitals WNL. Pt currently stable.      COPD exacerbation  Seasonal allergies: Note acute, worsening of SOB in the past 24-48 hours. Hx of COPD exacerbation treated outpatient with steroids about 1 month ago. Notes seasonal allergies, recent flare, with associated nasal congestion, worsening cough. Afebrile. No recent sick exposures. No associated chest pain. Utilizing home inhalers more frequently. Increased oxygen needs; baseline uses CPAP with 2 LPM at bedtime, but has been using 2 LPM continuously at home. CBC, BMP WNL. . Procal 0.06. CXR with noted patchy opacities in the right mid and lower lung with more focal opacity in the SARA. Pt had echocardiogram 5/2 with EF 68%, no RWMA, RVSP 46 mmHg. Received Solumedrol 125 mg X1, Magnesium 2 g, Duoneb X1 in the ED. Given the above, will treat for COPD exacerbation; since I cannot confirm that infiltrates on CXR aren't purely river, will cover pt with antibiotics for CAP. Also note elevated RVSP, thus some degree of pulmonary HTN may be contributing to above.   [PTA Combivent Respimat 200-100 mcg/ACT inhaler, Albuterol inhaler PRN]  -- Continue IMC status, px not improving  -- BiPAP was transitioned from constant to PRN, Respiratory following; OK now to give oral meds. Ween as tolerated   -- Telemetry, continuous pulse oximetry  -- Continue Solumedrol 62.5 mg IV BID   -- Xopenex nebs q4 hours while awake   -- IV Levaquin (PCN allergy noted)   --  RCAT consulted, encourage pulmonary toilet with IS and acapella   -- CBC and BMP in the AM    -- Consider CT chest w/o contrast todayu (note iodinated contrast allergy, also on AC at this point) if worsening and will have to get pulmonary involved   -- Appreciate pulmonary consult   -- Restart lasix PO 20 mg BID (PTA she is 40 mg in the AM and 20 at lunch, recently restarted and had bump in creatinine)     Adenocarcinoma of the lung: Follows with Dr. Gonsalez through Oncology at [facility]. Last visit 4/26/18. Most recent CT chest from 4/19/19 with significantly increased size of right upper lobe perihilar mass narrowing the right upper lobe bronchus, increased size of left upper lobe masslike density, new small to moderate size right pleural effusion, slight increase size subcarinal lymph nodes worrisome for metastatic disease. Pt completed radiation treatment (November), now started palliative chemotherapy with first round two days prior to admission.  -- Px still hypoxic and not improving, ? Contribution from Kaye. Appreciate Pulmonary officially consulted.  -- Pt was scheduled for repeat PFTs 5/6 at [place], will need to reschedule appointment      Atrial fibrillation, rate controlled   Chronic anticoagulation use:   -- Restart PTA Eliquis 5 mg po BID   -- Continue Cartia  mg po every day, Lopressor 100 mg po BID   -- Telemetry      HTN, hyperlipidemia       CKD III  Hx of partial right nephrectomy: Baseline creatinine ~1. Partial right nephrectomy in the setting of kidney stones. Px is on PTA Lasix which was restarted but creatinine bumped up today  -- Restart Lasix at 20 mg PO BID  -- Recheck in AM  -- Avoid nephrotoxic agents   -- Monitor renal function     Tobacco use disorder, history off: Previously smoked 1 ppd X~40 years, quit 7 years ago.      Hx of pneumothorax: This was following lung bx in 3/2019.             Diet: Regular Diet Adult    DVT Prophylaxis: On Eliquis  Medina Catheter: not  present  Code Status: Full Code      Disposition Plan   Expected discharge: 4 - 7 days, recommended to transitional care unit once Hypoxic respiratory failure improved.  Entered: Kunal Mcleod MD 05/09/2019, 7:54 AM       The patient's care was discussed with the Patient and Patient's Family.    Kunal Mcleod MD  Hospitalist Service  [facility]    ______________________________________________________________________    Interval History   Px the same.  ON CPAP overnight. Will re-obtain ABG and get a non-con CT this morning.  Also restart on diuresis.  Creatinine looks good today.  Appreciate pulmonary consults    Data reviewed today: I reviewed all medications, new labs and imaging results over the last 24 hours. I personally reviewed the CT of chest image(s) showing pending.    Physical Exam   Vital Signs: Temp: 97.8  F (36.6  C) Temp src: Axillary BP: (!) 125/95 Pulse: 95 Heart Rate: 97 Resp: 20 SpO2: 97 % O2 Device: BiPAP/CPAP Oxygen Delivery: 7 LPM  Weight: 214 lbs 11.65 oz  CONSTITUTIONAL: Pt laying in bed, a continues tachypnic.  Cooperative with interview. Accompanied by daughters at bedside.   HEENT: Normocephalic, atraumatic. Negative for conjunctival redness or scleral icterus.  Oral mucosa pink and moist; negative for ulcerations, erythema, or exudates.  Dentition in good repair.   CARDIOVASCULAR: Irregularly irregular rhythm, rate controlled. No murmurs, rubs, or extra heart sounds appreciated. Pulses +2/4 and irregular in upper and lower extremities, bilaterally.   RESPIRATORY: work of breathing as above.  Supplemental oxygenation via NC. Course rhonchi throughout all fields.   GASTROINTESTINAL:  Abdomen soft, non-distended. BS auscultated in all four quadrants. Negative for tenderness to  palpation.  No masses or organomegaly noted.  MUSCULOSKELETAL: No gross deformities noted. Normal muscle tone.   HEMATOLOGIC/LYMPHATIC/IMMUNOLOGIC: Negative for lower extremity edema,  bilaterally.  NEUROLOGIC: Alert and oriented to person, place, and time.  strength intact. No focal neuro deficits appreciated.   SKIN: Warm, dry, intact. No jaundice noted. Negative for suspicious lesions, rashes, bruising, open sores or abrasions.          Data   Recent Labs   Lab 05/09/19  0627 05/08/19  0530 05/07/19  0714 05/06/19  1235   WBC 8.9  --  4.4 8.3   HGB 10.9*  --  10.6* 11.4*   MCV 96  --  95 96     --  177 175    140 138  --    POTASSIUM 5.1 4.4 4.4  --    CHLORIDE 106 103 104  --    CO2 33* 31 31  --    BUN 57* 47* 25  --    CR 1.08* 1.16* 0.81  --    ANIONGAP 3 6 3  --    MELANIE 9.2 9.5 9.3  --    * 281* 238*  --      No results found for this or any previous visit (from the past 24 hour(s)).

## 2019-05-09 NOTE — PROGRESS NOTES
"PULMONOLOGY PROGRESS NOTE  Date of service: 2019  [facility]    CC/Reason for Visit: Respiratory failure, known lung cancer, COPD, pleural effusion, and hypoxemia.    SUBJECTIVE      HPI: Difficult night, using BiPAP currently, 10/5 cm, respiratory rate 12, FiO2 40%.  Complaint of dyspnea.    ROS: A Problem Pertinent review of systems was negative except for items noted in HPI.    Past Medical, Family, and Social/Substance History has been reviewed: No interval changes.    OBJECTIVE   BP (!) 125/95 (BP Location: Left arm)   Pulse 95   Temp 97.8  F (36.6  C) (Axillary)   Resp 20   Ht 1.651 m (5' 5\")   Wt 97.4 kg (214 lb 11.7 oz)   SpO2 96%   BMI 35.73 kg/m      Temp (24hrs), Av.2  F (36.8  C), Min:97.8  F (36.6  C), Max:98.8  F (37.1  C)       Intake/Output Summary (Last 24 hours) at 2019 1059  Last data filed at 2019 0600  Gross per 24 hour   Intake 1320 ml   Output 2200 ml   Net -880 ml     Patient Vitals for the past 96 hrs:   Weight   19 0050 97.4 kg (214 lb 11.7 oz)   19 0600 98.6 kg (217 lb 6 oz)   19 0723 90.7 kg (200 lb)       CONSTITUTIONAL/GENERAL APPEARANCE: Alert.  Appears uncomfortable.  PSYCHIATRIC: Pleasant and appropriate mood and affect. Oriented x 3.  EARS, NOSE,THROAT,MOUTH: External ears and nose overall normal.  Full face BiPAP mask.  NECK: Neck appearance normal. No neck masses and the thyroid is not enlarged.   RESPIRATORY: Decreased breath sounds right base, coarse breath sounds bilaterally.  CARDIOVASCULAR: Irregular rate and rhythm.  No murmur murmur.       LABORATORY ASSESSMENT      Recent Labs   Lab 19  0810 19  1118   PH 7.27*  --    PCO2 71*  --    PO2 75*  --    HCO3 33*  --    O2PER CPAP 8L O2 60%     Recent Labs   Lab 19  0627 19  0714   WBC 8.9 4.4   RBC 3.71* 3.64*   HGB 10.9* 10.6*   HCT 35.6 34.5*   MCV 96 95   MCH 29.4 29.1   MCHC 30.6* 30.7*   RDW 16.1* 15.6*    177     Recent Labs   Lab " 05/09/19  0627 05/08/19  0530 05/07/19  0714    140 138   POTASSIUM 5.1 4.4 4.4   CHLORIDE 106 103 104   CO2 33* 31 31   ANIONGAP 3 6 3   BUN 57* 47* 25   CR 1.08* 1.16* 0.81   GFRESTIMATED 49* 45* 69   GFRESTBLACK 57* 52* 80   MELANIE 9.2 9.5 9.3     No lab results found in last 7 days.  No lab results found in last 7 days.    Additional labs and/or comments:     IMAGING    CT scan of the chest 5/9 not yet formally interpreted, demonstrates moderate to large right-sided pleural effusion, platelike atelectasis/infiltrate in the right upper lobe, with dense right hilum, as well as infiltrate/mass in the left upper lobe.  Compared to a CT scan of the chest performed 3/1/2019 at the [facility] and reviewed on an iPad through the patient's portal there is been progression of the right upper lobe infiltrate/atelectasis, progression of the infiltrate mass in the left upper lobe, and development of the new right-sided pleural effusion.    PFT & OTHER TESTING       ASSESSMENT / PLAN      Active Problems:    COPD exacerbation (H)        Impression  -Acute hypoxic respiratory failure  -Pleural effusion right-sided  -Metastatic Lung cancer-progressive increase in infiltrate/mass both right and left sides.  -COPD     Recommendations  -Diagnostic/therapeutic thoracentesis to remove as much fluid as possible may improve respiratory failure and hypoxemia.  Counseled that pleural effusion may return quickly if malignant.  -cont steroids and levaquin  -continue nebulizers    Discussed with Dr. Mcleod.  Thoracentesis possibly complicated by single dose of Eliquis given last evening for atrial fibrillation.    Discussed with patient, nursing, and daughter at bedside.      Jose A King M.D.  [facility]  [facility]  [phone]  Pager [phone]

## 2019-05-09 NOTE — PLAN OF CARE
Pt confused at times, reorients, short of breath with activity, lungs diminished, crackles in the bases, O2 sats mid to low 90's on 7L oximyzer, poor appetite, good urine output. Passing flatus, no BM noted, stool softeners given, diltiazem drip stopped per dr Mcleod, heparin will be discontinued this pm per orders.

## 2019-05-09 NOTE — PROGRESS NOTES
ABG was drawn while pt was on home CPAP unit with 8L oxygen bled in.    Recent Labs   Lab 05/09/19  0810 05/06/19  1118   PH 7.27*  --    PCO2 71*  --    PO2 75*  --    HCO3 33*  --    O2PER CPAP 8L O2 60%     Patient was then placed on BiPAP 10/5 40%. SpO2 in the mid 90's. Gel pad and mepilex in place. Skin intact. Alarm volume set at 10.  Will cont to monitor.  5/9/2019  Senait Wilks RRT

## 2019-05-10 ENCOUNTER — APPOINTMENT (OUTPATIENT)
Dept: ULTRASOUND IMAGING | Facility: CLINIC | Age: 79
DRG: 189 | End: 2019-05-10
Attending: HOSPITALIST
Payer: MEDICARE

## 2019-05-10 ENCOUNTER — APPOINTMENT (OUTPATIENT)
Dept: PHYSICAL THERAPY | Facility: CLINIC | Age: 79
DRG: 189 | End: 2019-05-10
Payer: MEDICARE

## 2019-05-10 LAB
ANION GAP SERPL CALCULATED.3IONS-SCNC: 1 MMOL/L (ref 3–14)
APPEARANCE FLD: NORMAL
APTT PPP: 27 SEC (ref 22–37)
BASOPHILS # BLD AUTO: 0 10E9/L (ref 0–0.2)
BASOPHILS NFR BLD AUTO: 0 %
BUN SERPL-MCNC: 48 MG/DL (ref 7–30)
CALCIUM SERPL-MCNC: 9.6 MG/DL (ref 8.5–10.1)
CHLORIDE SERPL-SCNC: 106 MMOL/L (ref 94–109)
CO2 SERPL-SCNC: 37 MMOL/L (ref 20–32)
COLOR FLD: YELLOW
CREAT SERPL-MCNC: 0.89 MG/DL (ref 0.52–1.04)
DIFFERENTIAL METHOD BLD: ABNORMAL
EOSINOPHIL # BLD AUTO: 0 10E9/L (ref 0–0.7)
EOSINOPHIL NFR BLD AUTO: 0 %
ERYTHROCYTE [DISTWIDTH] IN BLOOD BY AUTOMATED COUNT: 15.7 % (ref 10–15)
GFR SERPL CREATININE-BSD FRML MDRD: 62 ML/MIN/{1.73_M2}
GLUCOSE BLDC GLUCOMTR-MCNC: 218 MG/DL (ref 70–99)
GLUCOSE FLD-MCNC: 226 MG/DL
GLUCOSE SERPL-MCNC: 217 MG/DL (ref 70–99)
GRAM STN SPEC: NORMAL
GRAM STN SPEC: NORMAL
HCT VFR BLD AUTO: 33.7 % (ref 35–47)
HGB BLD-MCNC: 10.5 G/DL (ref 11.7–15.7)
IMM GRANULOCYTES # BLD: 0 10E9/L (ref 0–0.4)
IMM GRANULOCYTES NFR BLD: 1.7 %
LACTATE BLD-SCNC: 0.7 MMOL/L (ref 0.7–2)
LDH FLD L TO P-CCNC: 83 U/L
LYMPHOCYTES # BLD AUTO: 0.2 10E9/L (ref 0.8–5.3)
LYMPHOCYTES NFR BLD AUTO: 6.2 %
MCH RBC QN AUTO: 29.7 PG (ref 26.5–33)
MCHC RBC AUTO-ENTMCNC: 31.2 G/DL (ref 31.5–36.5)
MCV RBC AUTO: 95 FL (ref 78–100)
MONOCYTES # BLD AUTO: 0 10E9/L (ref 0–1.3)
MONOCYTES NFR BLD AUTO: 0.4 %
NEUTROPHILS # BLD AUTO: 2.2 10E9/L (ref 1.6–8.3)
NEUTROPHILS NFR BLD AUTO: 91.7 %
NRBC # BLD AUTO: 0 10*3/UL
NRBC BLD AUTO-RTO: 0 /100
PLATELET # BLD AUTO: 123 10E9/L (ref 150–450)
POTASSIUM SERPL-SCNC: 4.5 MMOL/L (ref 3.4–5.3)
PROT FLD-MCNC: 2.1 G/DL
RBC # BLD AUTO: 3.54 10E12/L (ref 3.8–5.2)
SODIUM SERPL-SCNC: 144 MMOL/L (ref 133–144)
SPECIMEN SOURCE FLD: NORMAL
SPECIMEN SOURCE: NORMAL
WBC # BLD AUTO: 2.4 10E9/L (ref 4–11)
WBC # FLD AUTO: NORMAL /UL

## 2019-05-10 PROCEDURE — 82945 GLUCOSE OTHER FLUID: CPT | Performed by: HOSPITALIST

## 2019-05-10 PROCEDURE — A9270 NON-COVERED ITEM OR SERVICE: HCPCS | Performed by: PHYSICIAN ASSISTANT

## 2019-05-10 PROCEDURE — 88112 CYTOPATH CELL ENHANCE TECH: CPT | Mod: 26 | Performed by: HOSPITALIST

## 2019-05-10 PROCEDURE — 12000000 ZZH R&B MED SURG/OB

## 2019-05-10 PROCEDURE — 97530 THERAPEUTIC ACTIVITIES: CPT | Mod: GP | Performed by: PHYSICAL THERAPIST

## 2019-05-10 PROCEDURE — 88360 TUMOR IMMUNOHISTOCHEM/MANUAL: CPT | Mod: 26 | Performed by: HOSPITALIST

## 2019-05-10 PROCEDURE — 88342 IMHCHEM/IMCYTCHM 1ST ANTB: CPT | Performed by: HOSPITALIST

## 2019-05-10 PROCEDURE — 88305 TISSUE EXAM BY PATHOLOGIST: CPT | Performed by: HOSPITALIST

## 2019-05-10 PROCEDURE — 89051 BODY FLUID CELL COUNT: CPT | Performed by: HOSPITALIST

## 2019-05-10 PROCEDURE — 00000146 ZZHCL STATISTIC GLUCOSE BY METER IP

## 2019-05-10 PROCEDURE — 36415 COLL VENOUS BLD VENIPUNCTURE: CPT | Performed by: HOSPITALIST

## 2019-05-10 PROCEDURE — 88342 IMHCHEM/IMCYTCHM 1ST ANTB: CPT | Mod: 26,XU | Performed by: HOSPITALIST

## 2019-05-10 PROCEDURE — 40000275 ZZH STATISTIC RCP TIME EA 10 MIN

## 2019-05-10 PROCEDURE — 25000125 ZZHC RX 250: Performed by: RADIOLOGY

## 2019-05-10 PROCEDURE — 87070 CULTURE OTHR SPECIMN AEROBIC: CPT | Performed by: PHYSICIAN ASSISTANT

## 2019-05-10 PROCEDURE — 25000125 ZZHC RX 250: Performed by: PHYSICIAN ASSISTANT

## 2019-05-10 PROCEDURE — 00000102 ZZHCL STATISTIC CYTO WRIGHT STAIN TC: Performed by: HOSPITALIST

## 2019-05-10 PROCEDURE — 25000132 ZZH RX MED GY IP 250 OP 250 PS 637: Performed by: HOSPITALIST

## 2019-05-10 PROCEDURE — 87205 SMEAR GRAM STAIN: CPT | Performed by: PHYSICIAN ASSISTANT

## 2019-05-10 PROCEDURE — 84157 ASSAY OF PROTEIN OTHER: CPT | Performed by: HOSPITALIST

## 2019-05-10 PROCEDURE — 85025 COMPLETE CBC W/AUTO DIFF WBC: CPT | Performed by: HOSPITALIST

## 2019-05-10 PROCEDURE — 85730 THROMBOPLASTIN TIME PARTIAL: CPT | Performed by: HOSPITALIST

## 2019-05-10 PROCEDURE — 88360 TUMOR IMMUNOHISTOCHEM/MANUAL: CPT | Performed by: HOSPITALIST

## 2019-05-10 PROCEDURE — 25000132 ZZH RX MED GY IP 250 OP 250 PS 637: Performed by: PHYSICIAN ASSISTANT

## 2019-05-10 PROCEDURE — 25000125 ZZHC RX 250: Performed by: HOSPITALIST

## 2019-05-10 PROCEDURE — 83615 LACTATE (LD) (LDH) ENZYME: CPT | Performed by: HOSPITALIST

## 2019-05-10 PROCEDURE — 99232 SBSQ HOSP IP/OBS MODERATE 35: CPT | Performed by: HOSPITALIST

## 2019-05-10 PROCEDURE — 25000128 H RX IP 250 OP 636: Performed by: PHYSICIAN ASSISTANT

## 2019-05-10 PROCEDURE — 88305 TISSUE EXAM BY PATHOLOGIST: CPT | Mod: 26 | Performed by: HOSPITALIST

## 2019-05-10 PROCEDURE — 94640 AIRWAY INHALATION TREATMENT: CPT | Mod: 76

## 2019-05-10 PROCEDURE — 88341 IMHCHEM/IMCYTCHM EA ADD ANTB: CPT | Performed by: HOSPITALIST

## 2019-05-10 PROCEDURE — 88112 CYTOPATH CELL ENHANCE TECH: CPT | Performed by: HOSPITALIST

## 2019-05-10 PROCEDURE — 94660 CPAP INITIATION&MGMT: CPT

## 2019-05-10 PROCEDURE — 32555 ASPIRATE PLEURA W/ IMAGING: CPT

## 2019-05-10 PROCEDURE — 00000155 ZZHCL STATISTIC H-CELL BLOCK W/STAIN: Performed by: HOSPITALIST

## 2019-05-10 PROCEDURE — 88341 IMHCHEM/IMCYTCHM EA ADD ANTB: CPT | Mod: XU | Performed by: HOSPITALIST

## 2019-05-10 PROCEDURE — 21000003 ZZH R&B HEART CARE OVERFLOW

## 2019-05-10 PROCEDURE — 97116 GAIT TRAINING THERAPY: CPT | Mod: GP | Performed by: PHYSICAL THERAPIST

## 2019-05-10 PROCEDURE — 94640 AIRWAY INHALATION TREATMENT: CPT

## 2019-05-10 PROCEDURE — 0W993ZZ DRAINAGE OF RIGHT PLEURAL CAVITY, PERCUTANEOUS APPROACH: ICD-10-PCS | Performed by: RADIOLOGY

## 2019-05-10 PROCEDURE — 80048 BASIC METABOLIC PNL TOTAL CA: CPT | Performed by: HOSPITALIST

## 2019-05-10 PROCEDURE — 25000128 H RX IP 250 OP 636: Performed by: HOSPITALIST

## 2019-05-10 PROCEDURE — A9270 NON-COVERED ITEM OR SERVICE: HCPCS | Performed by: HOSPITALIST

## 2019-05-10 PROCEDURE — 83605 ASSAY OF LACTIC ACID: CPT | Performed by: HOSPITALIST

## 2019-05-10 RX ORDER — DEXTROSE MONOHYDRATE 25 G/50ML
25-50 INJECTION, SOLUTION INTRAVENOUS
Status: DISCONTINUED | OUTPATIENT
Start: 2019-05-10 | End: 2019-05-13 | Stop reason: HOSPADM

## 2019-05-10 RX ORDER — HYDRALAZINE HYDROCHLORIDE 20 MG/ML
10 INJECTION INTRAMUSCULAR; INTRAVENOUS EVERY 4 HOURS PRN
Status: DISCONTINUED | OUTPATIENT
Start: 2019-05-10 | End: 2019-05-13 | Stop reason: HOSPADM

## 2019-05-10 RX ORDER — NICOTINE POLACRILEX 4 MG
15-30 LOZENGE BUCCAL
Status: DISCONTINUED | OUTPATIENT
Start: 2019-05-10 | End: 2019-05-13 | Stop reason: HOSPADM

## 2019-05-10 RX ORDER — LORAZEPAM 2 MG/ML
.5-1 INJECTION INTRAMUSCULAR EVERY 4 HOURS PRN
Status: DISCONTINUED | OUTPATIENT
Start: 2019-05-10 | End: 2019-05-13 | Stop reason: HOSPADM

## 2019-05-10 RX ORDER — METOPROLOL TARTRATE 1 MG/ML
2.5-5 INJECTION, SOLUTION INTRAVENOUS EVERY 4 HOURS PRN
Status: DISCONTINUED | OUTPATIENT
Start: 2019-05-10 | End: 2019-05-13 | Stop reason: HOSPADM

## 2019-05-10 RX ORDER — LIDOCAINE HYDROCHLORIDE 10 MG/ML
10 INJECTION, SOLUTION EPIDURAL; INFILTRATION; INTRACAUDAL; PERINEURAL ONCE
Status: COMPLETED | OUTPATIENT
Start: 2019-05-10 | End: 2019-05-10

## 2019-05-10 RX ADMIN — SENNOSIDES AND DOCUSATE SODIUM 1 TABLET: 8.6; 5 TABLET ORAL at 21:28

## 2019-05-10 RX ADMIN — METHYLPREDNISOLONE SODIUM SUCCINATE 62.5 MG: 125 INJECTION, POWDER, FOR SOLUTION INTRAMUSCULAR; INTRAVENOUS at 08:49

## 2019-05-10 RX ADMIN — METHYLPREDNISOLONE SODIUM SUCCINATE 62.5 MG: 125 INJECTION, POWDER, FOR SOLUTION INTRAMUSCULAR; INTRAVENOUS at 21:29

## 2019-05-10 RX ADMIN — FUROSEMIDE 20 MG: 20 TABLET ORAL at 16:26

## 2019-05-10 RX ADMIN — METOPROLOL TARTRATE 100 MG: 100 TABLET, FILM COATED ORAL at 08:49

## 2019-05-10 RX ADMIN — FUROSEMIDE 20 MG: 20 TABLET ORAL at 08:49

## 2019-05-10 RX ADMIN — METOPROLOL TARTRATE 100 MG: 100 TABLET, FILM COATED ORAL at 21:28

## 2019-05-10 RX ADMIN — METOPROLOL TARTRATE 2.5 MG: 5 INJECTION, SOLUTION INTRAVENOUS at 19:19

## 2019-05-10 RX ADMIN — LEVALBUTEROL HYDROCHLORIDE 1.25 MG: 1.25 SOLUTION, CONCENTRATE RESPIRATORY (INHALATION) at 07:34

## 2019-05-10 RX ADMIN — LIDOCAINE HYDROCHLORIDE 10 ML: 10 INJECTION, SOLUTION EPIDURAL; INFILTRATION; INTRACAUDAL; PERINEURAL at 11:36

## 2019-05-10 RX ADMIN — LORAZEPAM 0.5 MG: 2 INJECTION INTRAMUSCULAR; INTRAVENOUS at 13:26

## 2019-05-10 RX ADMIN — Medication 1 MG: at 21:28

## 2019-05-10 RX ADMIN — LEVOFLOXACIN 500 MG: 5 INJECTION, SOLUTION INTRAVENOUS at 16:26

## 2019-05-10 RX ADMIN — DILTIAZEM HYDROCHLORIDE 300 MG: 180 CAPSULE, EXTENDED RELEASE ORAL at 08:49

## 2019-05-10 RX ADMIN — LEVALBUTEROL HYDROCHLORIDE 1.25 MG: 1.25 SOLUTION, CONCENTRATE RESPIRATORY (INHALATION) at 19:49

## 2019-05-10 ASSESSMENT — ACTIVITIES OF DAILY LIVING (ADL)
ADLS_ACUITY_SCORE: 18

## 2019-05-10 ASSESSMENT — MIFFLIN-ST. JEOR: SCORE: 1421.88

## 2019-05-10 NOTE — PROVIDER NOTIFICATION
"Paged Dr. Mcleod, \"Pt feeling anxious due to feeling short of breath. Inquiring if she could have some ativan\"    Order recieved  "

## 2019-05-10 NOTE — PLAN OF CARE
Somnolent. Oriented to self only. 5L oxymizer cannula. Tele A.fib with RVR. Ativan given anxiety associated with SOB, relieved. Thorcentesis today. Up with Ax1, walker, and gait belt. Purewick in place for incontinence. C/o rectal pressure and hasn't had BM, suppository given.

## 2019-05-10 NOTE — PLAN OF CARE
Discharge Planner PT   Patient plan for discharge: return to dtr's house with family support  Current status: Pt on 6 lpm via oximyzer, sats 93%, agreeable to amb, act ok per RN, min A for transfers with FWW, amb 120' with CGA and several standing rests for breathing, maintained sats above 90 during amb, dropped to mid 80's after once seated, recovered in 2 min with PLB.  Barriers to return to prior living situation: stairs to enter, resp status limiting act  Recommendations for discharge: home with family to provide min A or less with mob, Home PT  Rationale for recommendations: cont therapy to improve strength and act jennifer to progress functional mob I and safety       Entered by: DYANA KWOK 05/10/2019 9:38 AM

## 2019-05-10 NOTE — PROGRESS NOTES
"PULMONOLOGY PROGRESS NOTE  Date of service: 05/10/2019  [facility]    CC/Reason for Visit: Respiratory failure, known lung cancer, COPD, pleural effusion, and hypoxemia.    SUBJECTIVE      HPI: Seen during and after thoracentesis, 900 cc removed.  Patient tolerated well without complaint of cough or chest pain.    ROS: A Problem Pertinent review of systems was negative except for items noted in HPI.    Past Medical, Family, and Social/Substance History has been reviewed: No interval changes.    OBJECTIVE   /86   Pulse 98   Temp 98.8  F (37.1  C) (Axillary)   Resp 13   Ht 1.651 m (5' 5\")   Wt 94.1 kg (207 lb 7.3 oz)   SpO2 97%   BMI 34.52 kg/m      Temp (24hrs), Av.2  F (36.8  C), Min:97.8  F (36.6  C), Max:98.8  F (37.1  C)       Intake/Output Summary (Last 24 hours) at 2019 1059  Last data filed at 2019 0600  Gross per 24 hour   Intake 1320 ml   Output 2200 ml   Net -880 ml     Patient Vitals for the past 96 hrs:   Weight   05/10/19 0654 94.1 kg (207 lb 7.3 oz)   19 0050 97.4 kg (214 lb 11.7 oz)   19 0600 98.6 kg (217 lb 6 oz)       CONSTITUTIONAL/GENERAL APPEARANCE: Alert.  Appears uncomfortable.  PSYCHIATRIC: Pleasant and appropriate mood and affect. Oriented x 3.  EARS, NOSE,THROAT,MOUTH: External ears and nose overall normal.  Full face BiPAP mask.  NECK: Neck appearance normal. No neck masses and the thyroid is not enlarged.   RESPIRATORY: Few crackles at right base.  Right base, coarse breath sounds bilaterally.  CARDIOVASCULAR: Irregular rate and rhythm.  No murmur murmur.       LABORATORY ASSESSMENT      Recent Labs   Lab 19  0810 19  1118   PH 7.27*  --    PCO2 71*  --    PO2 75*  --    HCO3 33*  --    O2PER CPAP 8L O2 60%     Recent Labs   Lab 05/10/19  0654 19  0627   WBC 2.4* 8.9   RBC 3.54* 3.71*   HGB 10.5* 10.9*   HCT 33.7* 35.6   MCV 95 96   MCH 29.7 29.4   MCHC 31.2* 30.6*   RDW 15.7* 16.1*   * 207     Recent Labs   Lab " 05/10/19  0654 05/09/19  0627 05/08/19  0530    142 140   POTASSIUM 4.5 5.1 4.4   CHLORIDE 106 106 103   CO2 37* 33* 31   ANIONGAP 1* 3 6   BUN 48* 57* 47*   CR 0.89 1.08* 1.16*   GFRESTIMATED 62 49* 45*   GFRESTBLACK 72 57* 52*   MELANIE 9.6 9.2 9.5     No lab results found in last 7 days.  Recent Labs   Lab 05/09/19  1223   INR 1.04       Additional labs and/or comments:     IMAGING    CT scan of the chest 5/9 not yet formally interpreted, demonstrates moderate to large right-sided pleural effusion, platelike atelectasis/infiltrate in the right upper lobe, with dense right hilum, as well as infiltrate/mass in the left upper lobe.  Compared to a CT scan of the chest performed 3/1/2019 at the [facility] and reviewed on an iPad through the patient's portal there is been progression of the right upper lobe infiltrate/atelectasis, progression of the infiltrate mass in the left upper lobe, and development of the new right-sided pleural effusion.    PFT & OTHER TESTING       ASSESSMENT / PLAN      Active Problems:    COPD exacerbation (H)        Impression  -Acute hypoxic respiratory failure  -Pleural effusion right-sided  -Metastatic Lung cancer-progressive increase in infiltrate/mass both right and left sides.  -COPD     Recommendations  Goal is now to wean oxygen, and see if she can do well without BiPAP.  If doing well overnight,, would discharge to home with follow-up at the [facility].   -cont steroids and levaquin  -continue nebulizers    Discussed with patient, nursing, and daughters at bedside.      Jose A King M.D.  [facility]  [facility]  [phone]  Pager [phone]

## 2019-05-10 NOTE — PROVIDER NOTIFICATION
"Paged Dr. Mcleod, \"Pt has been in A.fib but RVR in the 140's. Also, HTN -180 Can pt have a PRN?\"    Orders recieved  "

## 2019-05-10 NOTE — PROGRESS NOTES
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 Pt currently utilizing BiPAP, remainder of vitals WNL. Pt currently stable.      COPD exacerbation  Right sided pleural effusioun  Seasonal allergies: Noted acute, worsening of SOB in the prior 24-48 hours of admission. Has Hx of COPD exacerbation treated outpatient with steroids about 1 month ago. Notes seasonal allergies, recent flare, with associated nasal congestion, worsening cough. Afebrile. No recent sick exposures. No associated chest pain. Utilizing home inhalers more frequently. Increased oxygen needs; baseline uses CPAP with 2 LPM at bedtime, but has been using 2 LPM continuously at home. CBC, BMP WNL. . Procal 0.06. CXR with noted patchy opacities in the right mid and lower lung with more focal opacity in the SARA on admission. Pt had echocardiogram 5/2 with EF 68%, no RWMA, RVSP 46 mmHg. Received Solumedrol 125 mg X1, Magnesium 2 g, Duoneb X1 in the ED. Given the above, initially treat for COPD exacerbation and antibiotics for possible CAP. Also note elevated RVSP on ECHO, thus some degree of pulmonary HTN may be contributing to above.  Px was not improving with above treatment so pulmonology was consulted who recommended a repeat CT that demonstrated moderate sized right pleural effusion (new), and bilateral masses c/w with her adeno and likely increased in size.  This led to thoracentesis on 5/10/2019.  [PTA Combivent Respimat 200-100 mcg/ACT inhaler, Albuterol inhaler PRN]  -- Continue [facility] status  -- Thoracentesis today  5/10/2019 completed: labs, culture and cytology all ordered  -- BiPAP was transitioned from constant to PRN, Respiratory following; OK now to give oral meds. Ween as tolerated   -- In the mean time high flow oxygen and wean as tolerated now s/p thora  -- Telemetry, continuous pulse oximetry  -- Continue Solumedrol 62.5 mg IV BID and wean as tolerated for long taper if improves with thora  -- Xopenex nebs q4 hours while awake   -- IV Levaquin (PCN allergy noted)   -- RCAT consulted, encourage pulmonary toilet with IS and acapella   -- CBC and BMP in the AM    -- Consider CT chest w/o contrast todayu (note iodinated contrast allergy, also on AC at this point) if worsening and will have to get pulmonary involved   -- Appreciate pulmonary consult   -- Restart lasix PO 20 mg BID (PTA she is 40 mg in the AM and 20 at lunch, recently restarted and had bump in creatinine) can increase back to PTA levels tomorrow.      Adenocarcinoma of the lung: Follows with Dr. Gonsalez through Oncology at [facility]. Last visit 4/26/18. Most recent CT chest from 4/19/19 with significantly increased size of right upper lobe perihilar mass narrowing the right upper lobe bronchus, increased size of left upper lobe masslike density, new small to moderate size right pleural effusion, slight increase size subcarinal lymph nodes worrisome for metastatic disease. Pt completed radiation treatment (November), now started palliative chemotherapy with first round two days prior to admission.  -- Repeated CT as noted above; suspect worsening, but no change in overall plan at this time  -- now with thoracentesis if can stabilize enough to discharge, then needs to follow up closely with [facility]     Atrial fibrillation, rate controlled   Chronic anticoagulation use:   -- PTA Eliquis 5 mg po BID on hold for thora today, likely can restart tomorrow  -- Continue Cartia  mg po every day, Lopressor 100 mg po BID   -- Telemetry      HTN,  hyperlipidemia;  -- Unchanged     CKD III  Hx of partial right nephrectomy: Baseline creatinine ~1. Partial right nephrectomy in the setting of kidney stones. Px is on PTA Lasix which was restarted but creatinine bumped up today  -- Restart Lasix at 20 mg PO BID  -- Recheck in AM  -- Avoid nephrotoxic agents   -- Monitor renal function     Tobacco use disorder, history off: Previously smoked 1 ppd X~40 years, quit 7 years ago.      Hx of pneumothorax: This was following lung bx in 3/2019.             Diet: Regular Diet Adult    DVT Prophylaxis: Restart Eliquis tomorrow if no further procedures recommended  Medina Catheter: not present  Code Status: Full Code      Disposition Plan   Expected discharge: 2 - 3 days, recommended to prior living arrangement once O2 use less than 2 liters/minute.  Entered: Kunal Mcleod MD 05/10/2019, 11:55 AM       The patient's care was discussed with the Bedside Nurse and Patient.    Kunal Mcleod MD  Hospitalist Service  [facility]    ______________________________________________________________________    Interval History   Thora today. Resting after Thora.  Labs sent.  Otherwise no changes. Discussed with family.    Data reviewed today: I reviewed all medications, new labs and imaging results over the last 24 hours. I personally reviewed no images or EKG's today.    Physical Exam   Vital Signs: Temp: 98.8  F (37.1  C) Temp src: Axillary BP: 155/86 Pulse: 98 Heart Rate: 142 Resp: 13 SpO2: 97 % O2 Device: Oxymizer cannula Oxygen Delivery: 6 LPM  Weight: 207 lbs 7.25 oz    CONSTITUTIONAL:  Pt laying in bed, resting comfortably. Accompanied by daughters at bedside.   HEENT: Normocephalic, atraumatic. Negative for conjunctival redness or scleral icterus.  Oral mucosa pink and moist; negative for ulcerations, erythema, or exudates.  Dentition in good repair.   CARDIOVASCULAR: Irregularly irregular rhythm, rate controlled. No murmurs, rubs, or extra heart  sounds appreciated. Pulses +2/4 and irregular in upper and lower extremities, bilaterally.   RESPIRATORY: work of breathing as above.  Supplemental oxygenation via NC. Course rhonchi throughout all fields.   GASTROINTESTINAL:  Abdomen soft, non-distended. BS auscultated in all four quadrants. Negative for tenderness to  palpation.  No masses or organomegaly noted.  MUSCULOSKELETAL: No gross deformities noted. Normal muscle tone.   HEMATOLOGIC/LYMPHATIC/IMMUNOLOGIC: Negative for lower extremity edema, bilaterally.  NEUROLOGIC: Alert and oriented to person, place, and time.  strength intact. No focal neuro deficits appreciated.   SKIN: Warm, dry, intact. No jaundice noted. Negative for suspicious lesions, rashes, bruising, open sores or abrasions.             Data

## 2019-05-10 NOTE — PLAN OF CARE
Patient disoriented at times, daughter stated that patient is improving. Up with assistance of one to commode. Vital signs stable except for heart rate tachy. On BIPAP saturation maintain in the 90's, sever dyspnea  on exertion with activities.   Patient has right pleural effusion for thoracentesis today. Tele: Afib with RVR.

## 2019-05-11 ENCOUNTER — APPOINTMENT (OUTPATIENT)
Dept: GENERAL RADIOLOGY | Facility: CLINIC | Age: 79
DRG: 189 | End: 2019-05-11
Attending: INTERNAL MEDICINE
Payer: MEDICARE

## 2019-05-11 LAB
ANION GAP SERPL CALCULATED.3IONS-SCNC: 1 MMOL/L (ref 3–14)
APTT PPP: 27 SEC (ref 22–37)
BASE EXCESS BLDV CALC-SCNC: 13.7 MMOL/L
BASE EXCESS BLDV CALC-SCNC: 14.4 MMOL/L
BASE EXCESS BLDV CALC-SCNC: 14.7 MMOL/L
BASOPHILS # BLD AUTO: 0 10E9/L (ref 0–0.2)
BASOPHILS NFR BLD AUTO: 0 %
BUN SERPL-MCNC: 49 MG/DL (ref 7–30)
CALCIUM SERPL-MCNC: 9.2 MG/DL (ref 8.5–10.1)
CHLORIDE SERPL-SCNC: 101 MMOL/L (ref 94–109)
CO2 SERPL-SCNC: 41 MMOL/L (ref 20–32)
CREAT SERPL-MCNC: 0.84 MG/DL (ref 0.52–1.04)
DIFFERENTIAL METHOD BLD: ABNORMAL
EOSINOPHIL # BLD AUTO: 0 10E9/L (ref 0–0.7)
EOSINOPHIL NFR BLD AUTO: 0 %
ERYTHROCYTE [DISTWIDTH] IN BLOOD BY AUTOMATED COUNT: 15.3 % (ref 10–15)
GFR SERPL CREATININE-BSD FRML MDRD: 66 ML/MIN/{1.73_M2}
GLUCOSE SERPL-MCNC: 215 MG/DL (ref 70–99)
HCO3 BLDV-SCNC: 43 MMOL/L (ref 21–28)
HCO3 BLDV-SCNC: 43 MMOL/L (ref 21–28)
HCO3 BLDV-SCNC: 44 MMOL/L (ref 21–28)
HCT VFR BLD AUTO: 35 % (ref 35–47)
HGB BLD-MCNC: 10.8 G/DL (ref 11.7–15.7)
IMM GRANULOCYTES # BLD: 0 10E9/L (ref 0–0.4)
IMM GRANULOCYTES NFR BLD: 1.3 %
LACTATE BLD-SCNC: 0.8 MMOL/L (ref 0.7–2)
LYMPHOCYTES # BLD AUTO: 0.2 10E9/L (ref 0.8–5.3)
LYMPHOCYTES NFR BLD AUTO: 9.1 %
MCH RBC QN AUTO: 29.4 PG (ref 26.5–33)
MCHC RBC AUTO-ENTMCNC: 30.9 G/DL (ref 31.5–36.5)
MCV RBC AUTO: 95 FL (ref 78–100)
MONOCYTES # BLD AUTO: 0 10E9/L (ref 0–1.3)
MONOCYTES NFR BLD AUTO: 0.9 %
NEUTROPHILS # BLD AUTO: 2.1 10E9/L (ref 1.6–8.3)
NEUTROPHILS NFR BLD AUTO: 88.7 %
NRBC # BLD AUTO: 0 10*3/UL
NRBC BLD AUTO-RTO: 0 /100
O2/TOTAL GAS SETTING VFR VENT: ABNORMAL %
OXYHGB MFR BLDV: 47 %
OXYHGB MFR BLDV: 58 %
OXYHGB MFR BLDV: 73 %
PCO2 BLDV: 80 MM HG (ref 40–50)
PCO2 BLDV: 88 MM HG (ref 40–50)
PCO2 BLDV: 90 MM HG (ref 40–50)
PH BLDV: 7.29 PH (ref 7.32–7.43)
PH BLDV: 7.31 PH (ref 7.32–7.43)
PH BLDV: 7.35 PH (ref 7.32–7.43)
PLATELET # BLD AUTO: 117 10E9/L (ref 150–450)
PO2 BLDV: 29 MM HG (ref 25–47)
PO2 BLDV: 36 MM HG (ref 25–47)
PO2 BLDV: 44 MM HG (ref 25–47)
POTASSIUM SERPL-SCNC: 4.5 MMOL/L (ref 3.4–5.3)
RBC # BLD AUTO: 3.67 10E12/L (ref 3.8–5.2)
SODIUM SERPL-SCNC: 143 MMOL/L (ref 133–144)
WBC # BLD AUTO: 2.3 10E9/L (ref 4–11)

## 2019-05-11 PROCEDURE — 40000275 ZZH STATISTIC RCP TIME EA 10 MIN

## 2019-05-11 PROCEDURE — 25000132 ZZH RX MED GY IP 250 OP 250 PS 637: Performed by: INTERNAL MEDICINE

## 2019-05-11 PROCEDURE — 94640 AIRWAY INHALATION TREATMENT: CPT

## 2019-05-11 PROCEDURE — 25000131 ZZH RX MED GY IP 250 OP 636 PS 637: Performed by: INTERNAL MEDICINE

## 2019-05-11 PROCEDURE — 83605 ASSAY OF LACTIC ACID: CPT | Performed by: HOSPITALIST

## 2019-05-11 PROCEDURE — A9270 NON-COVERED ITEM OR SERVICE: HCPCS | Performed by: HOSPITALIST

## 2019-05-11 PROCEDURE — A9270 NON-COVERED ITEM OR SERVICE: HCPCS | Performed by: INTERNAL MEDICINE

## 2019-05-11 PROCEDURE — 94660 CPAP INITIATION&MGMT: CPT

## 2019-05-11 PROCEDURE — 36415 COLL VENOUS BLD VENIPUNCTURE: CPT | Performed by: HOSPITALIST

## 2019-05-11 PROCEDURE — 36415 COLL VENOUS BLD VENIPUNCTURE: CPT | Performed by: INTERNAL MEDICINE

## 2019-05-11 PROCEDURE — 21000003 ZZH R&B HEART CARE OVERFLOW

## 2019-05-11 PROCEDURE — 25000125 ZZHC RX 250: Performed by: HOSPITALIST

## 2019-05-11 PROCEDURE — 82805 BLOOD GASES W/O2 SATURATION: CPT | Performed by: NURSE PRACTITIONER

## 2019-05-11 PROCEDURE — 80048 BASIC METABOLIC PNL TOTAL CA: CPT | Performed by: HOSPITALIST

## 2019-05-11 PROCEDURE — 82805 BLOOD GASES W/O2 SATURATION: CPT | Performed by: INTERNAL MEDICINE

## 2019-05-11 PROCEDURE — 25000125 ZZHC RX 250: Performed by: PHYSICIAN ASSISTANT

## 2019-05-11 PROCEDURE — 85730 THROMBOPLASTIN TIME PARTIAL: CPT | Performed by: HOSPITALIST

## 2019-05-11 PROCEDURE — 94640 AIRWAY INHALATION TREATMENT: CPT | Mod: 76

## 2019-05-11 PROCEDURE — 36415 COLL VENOUS BLD VENIPUNCTURE: CPT | Performed by: NURSE PRACTITIONER

## 2019-05-11 PROCEDURE — 71045 X-RAY EXAM CHEST 1 VIEW: CPT

## 2019-05-11 PROCEDURE — A9270 NON-COVERED ITEM OR SERVICE: HCPCS | Performed by: NURSE PRACTITIONER

## 2019-05-11 PROCEDURE — 99233 SBSQ HOSP IP/OBS HIGH 50: CPT | Performed by: INTERNAL MEDICINE

## 2019-05-11 PROCEDURE — 25000132 ZZH RX MED GY IP 250 OP 250 PS 637: Performed by: NURSE PRACTITIONER

## 2019-05-11 PROCEDURE — 25000132 ZZH RX MED GY IP 250 OP 250 PS 637: Performed by: HOSPITALIST

## 2019-05-11 PROCEDURE — 25000128 H RX IP 250 OP 636: Performed by: PHYSICIAN ASSISTANT

## 2019-05-11 PROCEDURE — 99207 ZZC APP CREDIT; MD BILLING SHARED VISIT: CPT | Performed by: NURSE PRACTITIONER

## 2019-05-11 PROCEDURE — 85025 COMPLETE CBC W/AUTO DIFF WBC: CPT | Performed by: HOSPITALIST

## 2019-05-11 PROCEDURE — 25000128 H RX IP 250 OP 636: Performed by: INTERNAL MEDICINE

## 2019-05-11 PROCEDURE — 12000000 ZZH R&B MED SURG/OB

## 2019-05-11 PROCEDURE — 25000132 ZZH RX MED GY IP 250 OP 250 PS 637: Performed by: PHYSICIAN ASSISTANT

## 2019-05-11 PROCEDURE — A9270 NON-COVERED ITEM OR SERVICE: HCPCS | Performed by: PHYSICIAN ASSISTANT

## 2019-05-11 RX ORDER — SIMETHICONE 80 MG
80 TABLET,CHEWABLE ORAL EVERY 6 HOURS PRN
Status: DISCONTINUED | OUTPATIENT
Start: 2019-05-11 | End: 2019-05-13 | Stop reason: HOSPADM

## 2019-05-11 RX ORDER — FUROSEMIDE 10 MG/ML
40 INJECTION INTRAMUSCULAR; INTRAVENOUS
Status: DISCONTINUED | OUTPATIENT
Start: 2019-05-11 | End: 2019-05-13

## 2019-05-11 RX ORDER — POLYETHYLENE GLYCOL 3350 17 G/17G
17 POWDER, FOR SOLUTION ORAL 2 TIMES DAILY PRN
Status: DISCONTINUED | OUTPATIENT
Start: 2019-05-11 | End: 2019-05-11

## 2019-05-11 RX ORDER — BENZTROPINE MESYLATE 1 MG/1
1-2 TABLET ORAL 3 TIMES DAILY PRN
Status: DISCONTINUED | OUTPATIENT
Start: 2019-05-11 | End: 2019-05-13 | Stop reason: HOSPADM

## 2019-05-11 RX ADMIN — LEVALBUTEROL HYDROCHLORIDE 1.25 MG: 1.25 SOLUTION, CONCENTRATE RESPIRATORY (INHALATION) at 21:17

## 2019-05-11 RX ADMIN — SENNOSIDES AND DOCUSATE SODIUM 2 TABLET: 8.6; 5 TABLET ORAL at 17:05

## 2019-05-11 RX ADMIN — DILTIAZEM HYDROCHLORIDE 300 MG: 180 CAPSULE, EXTENDED RELEASE ORAL at 12:10

## 2019-05-11 RX ADMIN — LEVOFLOXACIN 500 MG: 5 INJECTION, SOLUTION INTRAVENOUS at 11:48

## 2019-05-11 RX ADMIN — METHYLPREDNISOLONE SODIUM SUCCINATE 62.5 MG: 125 INJECTION, POWDER, FOR SOLUTION INTRAMUSCULAR; INTRAVENOUS at 20:27

## 2019-05-11 RX ADMIN — METOPROLOL TARTRATE 100 MG: 100 TABLET, FILM COATED ORAL at 07:45

## 2019-05-11 RX ADMIN — METHYLPREDNISOLONE SODIUM SUCCINATE 62.5 MG: 125 INJECTION, POWDER, FOR SOLUTION INTRAMUSCULAR; INTRAVENOUS at 11:41

## 2019-05-11 RX ADMIN — SIMETHICONE CHEW TAB 80 MG 80 MG: 80 TABLET ORAL at 04:39

## 2019-05-11 RX ADMIN — SIMETHICONE CHEW TAB 80 MG 80 MG: 80 TABLET ORAL at 11:55

## 2019-05-11 RX ADMIN — LEVALBUTEROL HYDROCHLORIDE 1.25 MG: 1.25 SOLUTION, CONCENTRATE RESPIRATORY (INHALATION) at 07:15

## 2019-05-11 RX ADMIN — METOPROLOL TARTRATE 100 MG: 100 TABLET, FILM COATED ORAL at 20:25

## 2019-05-11 RX ADMIN — SIMETHICONE CHEW TAB 80 MG 80 MG: 80 TABLET ORAL at 20:25

## 2019-05-11 RX ADMIN — ONDANSETRON 4 MG: 2 INJECTION INTRAMUSCULAR; INTRAVENOUS at 12:10

## 2019-05-11 RX ADMIN — MAGNESIUM HYDROXIDE 30 ML: 400 SUSPENSION ORAL at 20:17

## 2019-05-11 RX ADMIN — INSULIN GLARGINE 5 UNITS: 100 INJECTION, SOLUTION SUBCUTANEOUS at 12:17

## 2019-05-11 RX ADMIN — FUROSEMIDE 20 MG: 20 TABLET ORAL at 12:19

## 2019-05-11 RX ADMIN — APIXABAN 5 MG: 5 TABLET, FILM COATED ORAL at 20:25

## 2019-05-11 RX ADMIN — APIXABAN 5 MG: 5 TABLET, FILM COATED ORAL at 12:12

## 2019-05-11 RX ADMIN — LEVALBUTEROL HYDROCHLORIDE 1.25 MG: 1.25 SOLUTION, CONCENTRATE RESPIRATORY (INHALATION) at 16:19

## 2019-05-11 RX ADMIN — QUETIAPINE 12.5 MG: 25 TABLET, FILM COATED ORAL at 21:52

## 2019-05-11 RX ADMIN — QUETIAPINE 12.5 MG: 25 TABLET, FILM COATED ORAL at 12:17

## 2019-05-11 RX ADMIN — METOPROLOL TARTRATE 5 MG: 5 INJECTION, SOLUTION INTRAVENOUS at 05:10

## 2019-05-11 RX ADMIN — FUROSEMIDE 40 MG: 10 INJECTION, SOLUTION INTRAVENOUS at 17:05

## 2019-05-11 RX ADMIN — Medication 10 MG: at 17:05

## 2019-05-11 RX ADMIN — LEVALBUTEROL HYDROCHLORIDE 1.25 MG: 1.25 SOLUTION, CONCENTRATE RESPIRATORY (INHALATION) at 11:27

## 2019-05-11 ASSESSMENT — ACTIVITIES OF DAILY LIVING (ADL)
ADLS_ACUITY_SCORE: 18

## 2019-05-11 NOTE — PROGRESS NOTES
"PULMONOLOGY PROGRESS NOTE  Date of service: 2019  [facility]    CC/Reason for Visit: Respiratory failure, known lung cancer, COPD, pleural effusion, and hypoxemia.    SUBJECTIVE      HPI: Difficult night.  Continues to require BiPAP for respiratory distress.  ROS: A Problem Pertinent review of systems was negative except for items noted in HPI.    Past Medical, Family, and Social/Substance History has been reviewed: No interval changes.    OBJECTIVE   /71   Pulse 94   Temp 97.8  F (36.6  C) (Axillary)   Resp 19   Ht 1.651 m (5' 5\")   Wt 94.1 kg (207 lb 7.3 oz)   SpO2 100%   BMI 34.52 kg/m      Temp (24hrs), Av.2  F (36.8  C), Min:97.8  F (36.6  C), Max:98.8  F (37.1  C)       Intake/Output Summary (Last 24 hours) at 2019 1059  Last data filed at 2019 0600  Gross per 24 hour   Intake 1320 ml   Output 2200 ml   Net -880 ml     Patient Vitals for the past 96 hrs:   Weight   05/10/19 0654 94.1 kg (207 lb 7.3 oz)   19 0050 97.4 kg (214 lb 11.7 oz)   19 0600 98.6 kg (217 lb 6 oz)       CONSTITUTIONAL/GENERAL APPEARANCE: Alert.  Appears uncomfortable.  PSYCHIATRIC: Pleasant and appropriate mood and affect. Oriented x 3.  EARS, NOSE,THROAT,MOUTH: External ears and nose overall normal.  Full face BiPAP mask.  NECK: Neck appearance normal. No neck masses and the thyroid is not enlarged.   RESPIRATORY:Coarse breath sounds bilaterally.  CARDIOVASCULAR: Irregular rate and rhythm.  No murmur       LABORATORY ASSESSMENT      Recent Labs   Lab 19  1447 19  0810   PH  --  7.27*   PCO2  --  71*   PO2  --  75*   HCO3  --  33*   O2PER Juan CPAP 8L O2     Recent Labs   Lab 19  0418 05/10/19  0654   WBC 2.3* 2.4*   RBC 3.67* 3.54*   HGB 10.8* 10.5*   HCT 35.0 33.7*   MCV 95 95   MCH 29.4 29.7   MCHC 30.9* 31.2*   RDW 15.3* 15.7*   * 123*     Recent Labs   Lab 19  0418 05/10/19  0654 19  0627    144 142   POTASSIUM 4.5 4.5 5.1   CHLORIDE " 101 106 106   CO2 41* 37* 33*   ANIONGAP 1* 1* 3   BUN 49* 48* 57*   CR 0.84 0.89 1.08*   GFRESTIMATED 66 62 49*   GFRESTBLACK 77 72 57*   MELANIE 9.2 9.6 9.2     No lab results found in last 7 days.  Recent Labs   Lab 05/09/19  1223   INR 1.04       Additional labs and/or comments: Pleural effusion with a protein of 2.1 suggestive of transudate.  Cultures negative.    IMAGING    CT scan of the chest 5/9 not yet formally interpreted, demonstrates moderate to large right-sided pleural effusion, platelike atelectasis/infiltrate in the right upper lobe, with dense right hilum, as well as infiltrate/mass in the left upper lobe.  Compared to a CT scan of the chest performed 3/1/2019 at the [facility] and reviewed on an iPad through the patient's portal there is been progression of the right upper lobe infiltrate/atelectasis, progression of the infiltrate mass in the left upper lobe, and development of the new right-sided pleural effusion.    PFT & OTHER TESTING       ASSESSMENT / PLAN      Active Problems:    COPD exacerbation (H)        Impression  -Acute hypoxic respiratory failure  -Pleural effusion right-sided-transudative  -Metastatic Lung cancer-progressive increase in infiltrate/mass both right and left sides.  -COPD     Recommendations  Aggressive diuresis is indicated given transudative right-sided pleural effusion..   -cont steroids and levaquin  -continue nebulizers    Discussed with patient, nursing, and daughters at bedside.    Discussed with Dr. Anitha King M.D.  [facility]  [facility]  [phone]  Pager [phone]

## 2019-05-11 NOTE — PROVIDER NOTIFICATION
MD Notification    Notified Person: MD    Notified Person Name: David, TRACY    Notification Date/Time: 5/11/2019 4:34 AM     Notification Interaction: Paged NP    Purpose of Notification: Critical Lab: PCO2 88 on VBG    Orders Received: Place pt back on BiPAP    Comments:

## 2019-05-11 NOTE — PROGRESS NOTES
House PANFILO brief note:    Was asked by nursing to evaluate pt for worsening restlessness/agitation.  Upon arrival, pt lying in bed, appears frustrated, family present at bed noting this is not pt's baseline.  Pt able to state orientation questions; however, appears impulsive, restless, expressing profanity, insisting on leaving the hospital.  No focal neuro deficits noted, able to sit on edge of bed and ambulate to recliner with SBA/A1.  Pt has been off Bipap for > 24 hours, no follow up blood gas noted.  Pt's lung sounds diminished throughout, no obvious crackles or wheezes noted, continues on oxymizer 5L O2 with O2 sats 95%.  Pt insists on sitting in chair, nursing assisted pt to recliner, currently resting in recliner with daughter present at bedside.     Acute encephalopathy suspect 2/2 delirium, will rule out hypercapnia in setting of COPD exacerbation.    Interventions:  - Stat VBG  - Delirium protocol ordered including seroquel 12.5 mg PO BID PRN (QTc WNL); family in agreement to proceed with seroquel at this time  - Will defer de-escalation of steroids to rounding hospitalist should they deem appropriate     Addendum: 0430: Pt's VBG 7.31/88; will place pt on Bipap, recommend at least 12/5/minimal FiO2 as needed to maintain O2 sats > 90%.  Will repeat VBG in 3 hours.      BLAIR Ruiz, CNP  House PANFILO    I spent 5 min at the pt's bedside managing and coordinating pt's overall plan of care.

## 2019-05-11 NOTE — PROGRESS NOTES
[facility]    Medicine Progress Note - Hospitalist Service       Date of Admission:  5/6/2019  Assessment & Plan   Carine Rodriguez is a 79 year old female admitted on 5/6/2019. She presented with worsening pulmonary function    1: COPD exacerbation.  Not wheezing and no more complaints of dyspnea, but worsening pulmonary function with respiratory failure on the basis of hypoxia and hypercapnea.  She had a thoracentesis yesterday which is a transudate.  Echo cardiogram from [facility] on 5/2/2019 showed no regional wall motion abnormalities, a normal LVEF, no signs of diastolic dysfunction and mild pulmonary hypertension.  She has struggled with the BIPAP but is tolerating it now. A trial without the BIPAP has failed and her venous PCO2 is in the low 80s.  Will recheck that this PM. She is full code but her daughter wants to discuss that with her siblings and her mother.   Presently not wheezing and no dyspnea by Mrs. Rodriguez's report.  The Oncologist at [facility], Dr. Wood, does not think she has received immunotherapy which can cause a pneumonitis and the treatment for that would be high dose steroids which she is getting. Will recheck her CXR today due to worsened pulmonary status per rising PCO2  2: Adenocarcinoma of the lung.  Seems to be worsening despite chemotherapy and radiation  3: Hyperglycemia, most likely due to the steroids.  Would benefit from some Lantus. Glucose running 200s, 215 to 282.  She has had elevated glucose levels in the past controlled with diet modification She has not been eating much recently  4: Atrial fibrillation. Not new and on Eliquis 5mg bid.  Rate now in the low 100 range  5: Hypertension and hyperlipidemia, treated. Blood pressure waxes and wans with her status, mostly controlled  6: History of allergic reaction to Lisinopril  7: Stage 3 renal disease, s/p right partil nephrectomy due to kidney stones, stable, has been on furosemide  20mg bid. Renal function is normal  today and improved  8: Agitation, better on BiPap. Will continue   9: Leukopenia and reduction in platelets, in view of recent chemotherapy, that will be followed      ADDENDUM: I SPOKE TO MRS. RODRIGUEZ AND HER FAMILY AND SHE DOES NOT WANT TO BE INTUBATED. I'VE CHANGED HER CODE STATUS TO DNI.  SHE ALSO WANTS SOMETHING FOR SLEEP.  SHE HAS SOME SEROQUEL BUT NOT EFFECTIVE LAST NIGHT SO WILL TRY 0.5MG OF IV LORAZEPAM BUT ONLY WHEN SHE IS ON BIPAP AND IT IS CONTINUED FOR AT LEAST 8 HOURS AFTER THE LAST DOSE.        Diet: Regular Diet Adult    DVT Prophylaxis: apixaban  Medina Catheter: not present  Code Status: Full Code      Disposition Plan   Expected discharge: 4 - 7 days, recommended to prior living arrangement once resolution of deteriorated pulmonary status.  Entered: Jonas Welsh MD 05/11/2019, 8:54 AM       The patient's care was discussed with the Patient and Patient's Family.    Jonas Welsh MD  Hospitalist Service  [facility]    ______________________________________________________________________    Interval History   Mrs. Rodriguez has no dyspnea or chest pain.  No head or abdominal pain. Not eating well, no nausea. According to her daughter, she is more calm now than last night. I spoke to Dr. Wood of [facility] who is covering for Dr. Montoya.  (her [facility] # is [phone])  Dr. Wood does not think she has had immune therapy and will let Dr. Montoya know her status. Her daughter feels it is time to review the code status    Data reviewed today: I reviewed all medications, new labs and imaging results over the last 24 hours. I personally reviewed the chest x-ray image(s) showing resolution of the right pleural effusion, no pneumothorax and the densities in the right uppler lung, the left lung and heart appear normal.    Physical Exam   Vital Signs: Temp: 97.6  F (36.4  C) Temp src: Axillary BP: 141/69 Pulse: 120 Heart Rate: 130 Resp: 18 SpO2: 99 % O2 Device: BiPAP/CPAP Oxygen  Delivery: 5 LPM  Weight: 207 lbs 7.25 oz  General Appearance: Lethargic but responds and answers questions. BiPAP in place and tolerating it and the tidal volumes are now in the 500 to 700 ml range  Respiratory: poor air movement, no wheezing.  Cardiovascular: irregularly irregular rhythm, variable S1, normal S2 but distant, no murmurs or edema  GI: normal bowel sounds, not tender  Skin: no rashes       Data   Recent Labs   Lab 05/11/19  0418 05/10/19  0654 05/09/19  1223 05/09/19  0627   WBC 2.3* 2.4*  --  8.9   HGB 10.8* 10.5*  --  10.9*   MCV 95 95  --  96   * 123*  --  207   INR  --   --  1.04  --     144  --  142   POTASSIUM 4.5 4.5  --  5.1   CHLORIDE 101 106  --  106   CO2 41* 37*  --  33*   BUN 49* 48*  --  57*   CR 0.84 0.89  --  1.08*   ANIONGAP 1* 1*  --  3   MELANIE 9.2 9.6  --  9.2   * 217*  --  282*   PROTTOTAL  --   --  6.7*  --      Recent Results (from the past 24 hour(s))   US Thoracentesis    Narrative    ULTRASOUND GUIDED THORACENTESIS  5/10/2019 11:56 AM     HISTORY: Right-sided pleural effusion.    FINDINGS: Ultrasound was used to evaluate for the presence and best  approach for drainage of a pleural effusion. Written and oral informed  consent was obtained. A pause for the cause procedure to verify the  correct patient and correct procedure. The skin overlying the right  chest posteriorly was prepped and draped in the usual sterile fashion.  The subcutaneous tissues were anesthetized with 10 mL 1% lidocaine  given. A catheter was advanced into the pleural space and 900 mL of   ana colored fluid was drained. Patient was monitored by nurse under  my direct supervision throughout the exam. Ultrasound images were  permanently stored.  There were no immediate complications. Patient  left the ultrasound suite in satisfactory condition.      Impression    IMPRESSION: Technically successful thoracentesis without immediate  complications.    LOC ABBOTT MD

## 2019-05-11 NOTE — PLAN OF CARE
S&O, forgetful. Tachy at times. Tele AF CVR/RVR. Pain in abd reduced with simethicone. Ls dim, occ exp wheezes. Increasing agitation overnight. House NP called to assess. VBG acidotic. Placed back on BiPAP. IV metoprolol given x2 this shift for RVR. Rechecking VBG this AM. Purewick in place. UOP good.

## 2019-05-11 NOTE — PLAN OF CARE
Somnolent. Intermittent confusion. BiPAP most of the day with CO2 improvement. Tele A.fib with RVR. C/o abd pain, managed with mylicon. Pt constipated, suppository and senna given. Up with Ax1 to bedside commode. Purewick in place for incontinence, lasix. Seroquel given x1

## 2019-05-11 NOTE — PROGRESS NOTES
"Pt becoming more agited. Stating \" I'm done. I'm just done.\" Pt repeatedly demanding to go home. Pt becoming verbally abusive toward family, and this RN. Pt answering orientation questions correctly at this time. Per family, pt is severely sleep deprived, and this is not her baseline. House NP paged to assess patient.  "

## 2019-05-11 NOTE — PROVIDER NOTIFICATION
Paged Dr. Welsh, pt still on BiPAP, want to change any meds to IV instead of PO?     Dr. Welsh called back, recommends to take pt off BiPAP for a couple of mins and give PO meds.

## 2019-05-11 NOTE — PROGRESS NOTES
House NP follow up note    I was asked by my colleague Rc Hernandez NP to follow up VBG after starting pt on NIV/bipap earlier this am.  I note pt was off of bipap since ~0630.  VBG at 0715 showing worsening hypercarbia.  Pt was resumed on bipap per rounding hospitalist.      On 12/5 40% FiO2 tidal volumes are generally 300-400 w/ minute ventilation of 7-8L however when she goes back to sleep, Vt decreases to <300 in 100s-200s.  Rate is 19-20 and SpO2 is 99%.   --I will ask RT to increase inspiratory pressure to [id number].    --rec repeat VBG in 1-2h    Susan Purvis CNP  Hospitalist - [facility]  [phone]     Text Page

## 2019-05-11 NOTE — PLAN OF CARE
A&O, forgetful. VSS, tachycardic. Tele Afib with RVR.  On BiPAP. Simethicone for abdomin pain. SL. Purewick in place. No appetite. Family at beside. Up with 1 to 2, GB and walker. Will continue to monitor.

## 2019-05-11 NOTE — PROVIDER NOTIFICATION
MD Notification    Notified Person: MD    Notified Person Name: Anitha MD    Notification Date/Time: 5/11/2019 0745    Notification Interaction: Spoke to MD    Purpose of Notification: Critical PCO2 90.     Orders Received: Place back on BiPAP.    Comments:

## 2019-05-11 NOTE — PROGRESS NOTES
HOSPITALIST SERVICE CROSS-COVER NOTE:    Ordered Miralax prn for constipation.      Alfonso Masters MD  Hospitalist  Pager # [phone]

## 2019-05-11 NOTE — PROVIDER NOTIFICATION
MD Notification    Notified Person: MD    Notified Person Name: MD Paco    Notification Date/Time: 5/11/2019 0327    Notification Interaction: Paged MD    Purpose of Notification: Pt c/o gas pain in lower abdomen. Pt requesting Simethicone. Pt passing gas, BS active    Orders Received:    Comments:

## 2019-05-12 LAB
BASE EXCESS BLDV CALC-SCNC: 19 MMOL/L
BASOPHILS # BLD AUTO: 0 10E9/L (ref 0–0.2)
BASOPHILS NFR BLD AUTO: 0 %
CREAT SERPL-MCNC: 1.01 MG/DL (ref 0.52–1.04)
DIFFERENTIAL METHOD BLD: ABNORMAL
EOSINOPHIL # BLD AUTO: 0 10E9/L (ref 0–0.7)
EOSINOPHIL NFR BLD AUTO: 0 %
ERYTHROCYTE [DISTWIDTH] IN BLOOD BY AUTOMATED COUNT: 15.2 % (ref 10–15)
GFR SERPL CREATININE-BSD FRML MDRD: 53 ML/MIN/{1.73_M2}
GLUCOSE BLDC GLUCOMTR-MCNC: 227 MG/DL (ref 70–99)
GLUCOSE BLDC GLUCOMTR-MCNC: 250 MG/DL (ref 70–99)
HCO3 BLDV-SCNC: 48 MMOL/L (ref 21–28)
HCT VFR BLD AUTO: 33.3 % (ref 35–47)
HGB BLD-MCNC: 10.7 G/DL (ref 11.7–15.7)
IMM GRANULOCYTES # BLD: 0 10E9/L (ref 0–0.4)
IMM GRANULOCYTES NFR BLD: 0.8 %
LACTATE BLD-SCNC: 1.3 MMOL/L (ref 0.7–2)
LYMPHOCYTES # BLD AUTO: 0.2 10E9/L (ref 0.8–5.3)
LYMPHOCYTES NFR BLD AUTO: 7.5 %
MCH RBC QN AUTO: 29.9 PG (ref 26.5–33)
MCHC RBC AUTO-ENTMCNC: 32.1 G/DL (ref 31.5–36.5)
MCV RBC AUTO: 93 FL (ref 78–100)
MONOCYTES # BLD AUTO: 0.1 10E9/L (ref 0–1.3)
MONOCYTES NFR BLD AUTO: 2.8 %
NEUTROPHILS # BLD AUTO: 2.3 10E9/L (ref 1.6–8.3)
NEUTROPHILS NFR BLD AUTO: 88.9 %
NRBC # BLD AUTO: 0 10*3/UL
NRBC BLD AUTO-RTO: 0 /100
OXYHGB MFR BLDV: 42 %
PCO2 BLDV: 79 MM HG (ref 40–50)
PH BLDV: 7.39 PH (ref 7.32–7.43)
PLATELET # BLD AUTO: 80 10E9/L (ref 150–450)
PO2 BLDV: 28 MM HG (ref 25–47)
RBC # BLD AUTO: 3.58 10E12/L (ref 3.8–5.2)
WBC # BLD AUTO: 2.5 10E9/L (ref 4–11)

## 2019-05-12 PROCEDURE — 25000128 H RX IP 250 OP 636: Performed by: INTERNAL MEDICINE

## 2019-05-12 PROCEDURE — 25000131 ZZH RX MED GY IP 250 OP 636 PS 637: Performed by: INTERNAL MEDICINE

## 2019-05-12 PROCEDURE — 85025 COMPLETE CBC W/AUTO DIFF WBC: CPT | Performed by: INTERNAL MEDICINE

## 2019-05-12 PROCEDURE — 36415 COLL VENOUS BLD VENIPUNCTURE: CPT | Performed by: INTERNAL MEDICINE

## 2019-05-12 PROCEDURE — 25000132 ZZH RX MED GY IP 250 OP 250 PS 637: Performed by: HOSPITALIST

## 2019-05-12 PROCEDURE — 12000000 ZZH R&B MED SURG/OB

## 2019-05-12 PROCEDURE — 94640 AIRWAY INHALATION TREATMENT: CPT | Mod: 76

## 2019-05-12 PROCEDURE — A9270 NON-COVERED ITEM OR SERVICE: HCPCS | Performed by: INTERNAL MEDICINE

## 2019-05-12 PROCEDURE — 40000556 ZZH STATISTIC PERIPHERAL IV START W US GUIDANCE

## 2019-05-12 PROCEDURE — 25000125 ZZHC RX 250: Performed by: HOSPITALIST

## 2019-05-12 PROCEDURE — 00000146 ZZHCL STATISTIC GLUCOSE BY METER IP

## 2019-05-12 PROCEDURE — 25000125 ZZHC RX 250: Performed by: PHYSICIAN ASSISTANT

## 2019-05-12 PROCEDURE — 93005 ELECTROCARDIOGRAM TRACING: CPT

## 2019-05-12 PROCEDURE — A9270 NON-COVERED ITEM OR SERVICE: HCPCS | Performed by: HOSPITALIST

## 2019-05-12 PROCEDURE — 40000275 ZZH STATISTIC RCP TIME EA 10 MIN

## 2019-05-12 PROCEDURE — 25000128 H RX IP 250 OP 636: Performed by: PHYSICIAN ASSISTANT

## 2019-05-12 PROCEDURE — 82565 ASSAY OF CREATININE: CPT | Performed by: INTERNAL MEDICINE

## 2019-05-12 PROCEDURE — 93010 ELECTROCARDIOGRAM REPORT: CPT | Performed by: INTERNAL MEDICINE

## 2019-05-12 PROCEDURE — 25000132 ZZH RX MED GY IP 250 OP 250 PS 637: Performed by: PHYSICIAN ASSISTANT

## 2019-05-12 PROCEDURE — A9270 NON-COVERED ITEM OR SERVICE: HCPCS | Performed by: PHYSICIAN ASSISTANT

## 2019-05-12 PROCEDURE — 99233 SBSQ HOSP IP/OBS HIGH 50: CPT | Performed by: INTERNAL MEDICINE

## 2019-05-12 PROCEDURE — 83605 ASSAY OF LACTIC ACID: CPT | Performed by: INTERNAL MEDICINE

## 2019-05-12 PROCEDURE — 21000003 ZZH R&B HEART CARE OVERFLOW

## 2019-05-12 PROCEDURE — 94640 AIRWAY INHALATION TREATMENT: CPT

## 2019-05-12 PROCEDURE — 25000132 ZZH RX MED GY IP 250 OP 250 PS 637: Performed by: INTERNAL MEDICINE

## 2019-05-12 PROCEDURE — 82805 BLOOD GASES W/O2 SATURATION: CPT | Performed by: INTERNAL MEDICINE

## 2019-05-12 RX ORDER — DILTIAZEM HYDROCHLORIDE 120 MG/1
120 CAPSULE, EXTENDED RELEASE ORAL DAILY
Status: DISCONTINUED | OUTPATIENT
Start: 2019-05-12 | End: 2019-05-13 | Stop reason: HOSPADM

## 2019-05-12 RX ORDER — DILTIAZEM HYDROCHLORIDE 180 MG/1
180 CAPSULE, EXTENDED RELEASE ORAL DAILY
Status: DISCONTINUED | OUTPATIENT
Start: 2019-05-12 | End: 2019-05-13 | Stop reason: HOSPADM

## 2019-05-12 RX ORDER — QUETIAPINE FUMARATE 25 MG/1
25-50 TABLET, FILM COATED ORAL 3 TIMES DAILY
Status: DISCONTINUED | OUTPATIENT
Start: 2019-05-12 | End: 2019-05-13 | Stop reason: HOSPADM

## 2019-05-12 RX ADMIN — QUETIAPINE 25 MG: 25 TABLET ORAL at 18:30

## 2019-05-12 RX ADMIN — LEVALBUTEROL HYDROCHLORIDE 1.25 MG: 1.25 SOLUTION, CONCENTRATE RESPIRATORY (INHALATION) at 08:04

## 2019-05-12 RX ADMIN — APIXABAN 5 MG: 5 TABLET, FILM COATED ORAL at 20:27

## 2019-05-12 RX ADMIN — APIXABAN 5 MG: 5 TABLET, FILM COATED ORAL at 09:34

## 2019-05-12 RX ADMIN — METOPROLOL TARTRATE 5 MG: 5 INJECTION, SOLUTION INTRAVENOUS at 04:35

## 2019-05-12 RX ADMIN — FUROSEMIDE 40 MG: 10 INJECTION, SOLUTION INTRAVENOUS at 12:58

## 2019-05-12 RX ADMIN — METHYLPREDNISOLONE SODIUM SUCCINATE 62.5 MG: 125 INJECTION, POWDER, FOR SOLUTION INTRAMUSCULAR; INTRAVENOUS at 12:59

## 2019-05-12 RX ADMIN — QUETIAPINE 50 MG: 25 TABLET ORAL at 23:17

## 2019-05-12 RX ADMIN — LEVALBUTEROL HYDROCHLORIDE 1.25 MG: 1.25 SOLUTION, CONCENTRATE RESPIRATORY (INHALATION) at 11:49

## 2019-05-12 RX ADMIN — INSULIN GLARGINE 5 UNITS: 100 INJECTION, SOLUTION SUBCUTANEOUS at 09:33

## 2019-05-12 RX ADMIN — DILTIAZEM HYDROCHLORIDE 180 MG: 180 CAPSULE, EXTENDED RELEASE ORAL at 12:58

## 2019-05-12 RX ADMIN — ACETAMINOPHEN 1000 MG: 500 TABLET, FILM COATED ORAL at 02:05

## 2019-05-12 RX ADMIN — LEVOFLOXACIN 500 MG: 5 INJECTION, SOLUTION INTRAVENOUS at 12:58

## 2019-05-12 RX ADMIN — LEVALBUTEROL HYDROCHLORIDE 1.25 MG: 1.25 SOLUTION, CONCENTRATE RESPIRATORY (INHALATION) at 15:26

## 2019-05-12 RX ADMIN — FUROSEMIDE 40 MG: 10 INJECTION, SOLUTION INTRAVENOUS at 20:21

## 2019-05-12 RX ADMIN — METOPROLOL TARTRATE 100 MG: 100 TABLET, FILM COATED ORAL at 09:34

## 2019-05-12 RX ADMIN — METHYLPREDNISOLONE SODIUM SUCCINATE 62.5 MG: 125 INJECTION, POWDER, FOR SOLUTION INTRAMUSCULAR; INTRAVENOUS at 20:10

## 2019-05-12 RX ADMIN — DILTIAZEM HYDROCHLORIDE 120 MG: 120 CAPSULE, EXTENDED RELEASE ORAL at 12:58

## 2019-05-12 ASSESSMENT — ACTIVITIES OF DAILY LIVING (ADL)
ADLS_ACUITY_SCORE: 20
ADLS_ACUITY_SCORE: 20
ADLS_ACUITY_SCORE: 19
ADLS_ACUITY_SCORE: 20
ADLS_ACUITY_SCORE: 21
ADLS_ACUITY_SCORE: 19

## 2019-05-12 NOTE — PLAN OF CARE
PT: Patient noted to be made comfort cares with transition to hospice cares per review of medical record. PT goals not met. Will complete order given goals of care are no longer restorative.   Physical Therapy Discharge Summary    Reason for therapy discharge:    Change in medical status.  Patient made comfort cares and hospice being pursued     Progress towards therapy goal(s). See goals on Care Plan in [facility] electronic health record for goal details.  Goals not met.  Barriers to achieving goals:   change to comfort cares/hospice .    Therapy recommendation(s):    No further therapy is recommended.

## 2019-05-12 NOTE — PROGRESS NOTES
Pulmonary note.    Patient and family have elected to proceed to hospice.    Will sign off.    Please call with any questions.    Jose A King M.D.  [facility]  [facility]  [phone]  Pager [phone]

## 2019-05-12 NOTE — PROGRESS NOTES
[facility]    Medicine Progress Note - Hospitalist Service       Date of Admission:  5/6/2019  Assessment & Plan   Carine Rodriguez is a 79 year old female admitted on 5/6/2019. She with decreased pulmonary function    1: COPD exacerbation with hypercapnea.  Better PCO2 yesterday afternoon. She is not keeping her BiPap on and has been restless. Pleural fluid was a transudate so Dr. King requested that she be diuresed more agressively and I agreed. Furosemide increased from 20mg oral bid to 40mg !V bid.  No IV access at this time. Last CXR showed the effusion had not recurred and the lung was not deflated and the masses/infiltrates were unchanged. Awaiting labs this AM  2: Adenocarcinoma of the lung.  She has received one dose of Keytruda but Dr. King does not feel her CT chest looks like pneumonitis from immune therapy.  3: Constipation, some success yesterday per nursing  4: Afib, the rate seems slower today. The Diltiazem is making the constipation worse as is the diuresis  5: Hyperglycemia due to steroids. She received 5 units of Glargine yesterday but no blood sugars checked. According to the  Nurse, she is eating and drinking reasonably well.  I'll check in to be sure  POCT for glucose is ordered  6: Hypertension, controlled and her hyperlipidemia is treated  7: Stage 3 renal disease, labs not drawn yet today  8: Agitation which I suspect is part of delirium, knows she is at the [facility], no orientation to day, month, date or year.      11:18 AM  ADDENDUM;  I MET WITH THE FAMILY.  THEY FEEL THAT SHE WOULD NOT WANT TO LIVE THIS WAY.  THEY WOULD LIKE TO PROCEED WITH HOSPICE AND THEY WANT TO COMBAT THE DELIRIUM WITH MORE MEDICINES.  THEY ARE AWARE THAT THESE THINGS COULD HASTEN HER DEATH   THEY ALSO WANT TO STOP THE BIPAP AND THEREFORE ARE NOT INTERESTED IN CHECKING VENOUS BLOOD GASES.  WE DID DISCUSS THE IV FUROSEMIDE AND THEY ARE OK WITH CONTINUING THAT FOR A SHORT TIME TO SEE IF  THAT ALLOWS HER TO IMPROVE. HER CODE STATUS WILL NOW BE DNR/DNI AND THEY ALL AGREE. ORDERS FOR HOSPICE CONSULT, INCREASING THE DOSE OF SEROQUEL AND STOPPING THE BIPAP ENTERED.   Diet: Regular Diet Adult    DVT Prophylaxis: apixaban  Medina Catheter: not present  Code Status: DNI      Disposition Plan   Expected discharge: to be determined, recommended to transitional care unit once when pulmonary status is improved.  Entered: Jonas Welsh MD 05/12/2019, 8:02 AM       The patient's care was discussed with the Patient and Patient's Family.    Jonas Welsh MD  Hospitalist Service  [facility]    ______________________________________________________________________    Interval History   Mrs. Rodriguez says she hurts all over. The BiPap has been off for at least an hour. According to the nurses, she did have some BM yesterday. Before then, was agitated and up and down She feels her breathing is ok. Mouth was dry, no nausea. Not much history other than that. Awaiting the family to come in as Nursing says they want to talk over options.    Data reviewed today: I reviewed all medications, new labs and imaging results over the last 24 hours. I personally reviewed no images or EKG's today.    Physical Exam   Vital Signs: Temp: 97.9  F (36.6  C) Temp src: Oral BP: (!) 116/93 Pulse: 129 Heart Rate: 142 Resp: 10 SpO2: 98 % O2 Device: (P) Oxymizer cannula Oxygen Delivery: (P) 3 LPM  The pulse I got  At the time of my exam was 104.    Weight: 207 lbs 7.25 oz  General Appearance: Looks uncomfortable but not dyspneic.  Not oriented to time, voice soft and repositioning did not help much  Respiratory: clear, no wheezing or rales  Cardiovascular: Irregularly irregular rhythm, variable S1, normal S2, no S3 or murmurs, no edema, chest wall tender to touch  GI: normal bowel sounds, no more tender than the chest wall. No rebound or guarding  Skin: no rashes  Other:      Data   Recent Labs   Lab 05/11/19  5960  05/10/19  0654 05/09/19  1223 05/09/19  0627   WBC 2.3* 2.4*  --  8.9   HGB 10.8* 10.5*  --  10.9*   MCV 95 95  --  96   * 123*  --  207   INR  --   --  1.04  --     144  --  142   POTASSIUM 4.5 4.5  --  5.1   CHLORIDE 101 106  --  106   CO2 41* 37*  --  33*   BUN 49* 48*  --  57*   CR 0.84 0.89  --  1.08*   ANIONGAP 1* 1*  --  3   MELANIE 9.2 9.6  --  9.2   * 217*  --  282*   PROTTOTAL  --   --  6.7*  --      Recent Results (from the past 24 hour(s))   XR Chest Port 1 View    Narrative    CHEST ONE VIEW PORTABLE  5/11/2019 9:17 AM     HISTORY: Had a thoracentesis yesterday, worsened pulmonary status.     COMPARISON: 5/6/2019      Impression    IMPRESSION: Again, there are bilateral upper lobe masses or  consolidative densities; please see recent chest CT report. No  pneumothorax.    FADIA PACHECO MD

## 2019-05-12 NOTE — PLAN OF CARE
Somnolent but pt frequently jolts awake. Oriented to self only. 2L NC. Shallow breathing. Family and pt decided to pursue hospice, SW consulted. BiPAP and IMC d/c'd. Medium sized hard BM this am. Voiding, incontinent, purewick in place. Up with Ax1.

## 2019-05-12 NOTE — PLAN OF CARE
Patient is A+OX2-4, orientation waxes and wanes. Intermittently more confused but has moments of clarity. Seroquel given before bedtime for sleep, ineffective. VSS on BiPAP, but patient takes it off frequently. Continues to complain of constipation, milk of mag given but no results overnight. MD notified this morning about abdomen being more distended, tap water enema ordered. Attempted enema X2, patient unable to hold water in. No results. Incontinent of urine, continued to remove purewick. Up with 1-2 to bedside commode. Tele afib RVR. Metoprolol given, ineffective. One brief episode of chest pain. EKG obtained, unremarkable. Discharge plan pending.

## 2019-05-12 NOTE — CONSULTS
Care Transition Initial Assessment - SW     Met with: Patient and dtrs Soco and Nina    Active Problems:    COPD exacerbation (H)       DATA  Lives With: alone in [place]  Quality of Family Relationships: involved, supportive  Description of Support System: Supportive, Involved  Who is your support system?: Children- 3 dtr's who live in the [facility]  Support Assessment: Adequate family and caregiver support.   Identified issues/concerns regarding health management: discharge planning   Quality of Family Relationships: involved, supportive     ASSESSMENT  Cognitive Status:  awake, alert and oriented although choosing to rest and to not be part of assessment.  Concerns to be addressed: discharge options, Hospice options, home care options  SW consulted to assist with discharge planning, emotional support and transportation arrangements.  Pt is a 79 yr old female who admitted on 5/6/19 for COPD exacerbation. Patient previously lived alone in [place] and used home oxygen and CPAP. Patient is telling her family that she is interested in Hospice as she does not want to continue use of bi-pap. Patient and family are exploring all of their options and interested in a Hospice consult in the hospital  Options discussed with family;  1-Hospice Home  2-LTC with Hospice  3-Home with Hospice and hired home care services.     PLAN  Financial costs for the patient includes: Transportation, possible home care services vs SNF private pay vs Hospice Home  Patient given options and choices for discharge: Hospice list, Home Care list, Hospice Home list  Patient/family is agreeable to the plan?  Yes: In gathering info stage  Patient Goals and Preferences: TBD .  Patient anticipates discharging to:  TBD  -SW arranged [facility] consult for 0930 Monday, May 13 in patient room.  -Per family request, this writer spoke to [facility] home in [place] who indicate an opening currently. [facility] indicate a bed  available on Tuesday.  -MAGALY provided SNF list, Hospice list, Hospice Home list and transportation option through HE.    MAGALY spoke to dtr Soco, informing her of Hospice home openings and of Hospice consult scheduled for Monday morning at 0930. Soco acknowledged and agreed to consult. Final decision pending. MAGALY updated nursing staff and paged MD of plan. SW to continue to follow and assist with discharge planning.

## 2019-05-12 NOTE — PROVIDER NOTIFICATION
MD Notification    Notified Person: MD    Notified Person Name: Dr. Magallanes    Notification Date/Time: 5/12/19 7352    Notification Interaction: Paged    Purpose of Notification: Pt complaining of constipation. Abdomen is more distended this morning.     Orders Received: Tap water enema ordered.     Comments:

## 2019-05-12 NOTE — PROVIDER NOTIFICATION
Brief update:    Paged re: constipation for several days, abdomen more distended, no response to miralax.     Enema ordered PRN  May have some gaseous distention of the abdomen related to BiPAP as well.    Bharathi Magallanes MD  5:58 AM

## 2019-05-13 VITALS
TEMPERATURE: 98.2 F | DIASTOLIC BLOOD PRESSURE: 64 MMHG | BODY MASS INDEX: 34.56 KG/M2 | HEART RATE: 98 BPM | HEIGHT: 65 IN | WEIGHT: 207.45 LBS | RESPIRATION RATE: 24 BRPM | OXYGEN SATURATION: 97 % | SYSTOLIC BLOOD PRESSURE: 94 MMHG

## 2019-05-13 LAB
ANION GAP SERPL CALCULATED.3IONS-SCNC: 2 MMOL/L (ref 3–14)
BASE EXCESS BLDA CALC-SCNC: 17.2 MMOL/L
BASOPHILS # BLD AUTO: 0 10E9/L (ref 0–0.2)
BASOPHILS NFR BLD AUTO: 0 %
BUN SERPL-MCNC: 67 MG/DL (ref 7–30)
CALCIUM SERPL-MCNC: 9 MG/DL (ref 8.5–10.1)
CHLORIDE SERPL-SCNC: 91 MMOL/L (ref 94–109)
CO2 SERPL-SCNC: 41 MMOL/L (ref 20–32)
CREAT SERPL-MCNC: 1.1 MG/DL (ref 0.52–1.04)
DACRYOCYTES BLD QL SMEAR: SLIGHT
DIFFERENTIAL METHOD BLD: ABNORMAL
EOSINOPHIL # BLD AUTO: 0 10E9/L (ref 0–0.7)
EOSINOPHIL NFR BLD AUTO: 0 %
ERYTHROCYTE [DISTWIDTH] IN BLOOD BY AUTOMATED COUNT: 15.3 % (ref 10–15)
GFR SERPL CREATININE-BSD FRML MDRD: 48 ML/MIN/{1.73_M2}
GLUCOSE SERPL-MCNC: 315 MG/DL (ref 70–99)
HCO3 BLD-SCNC: 43 MMOL/L (ref 21–28)
HCT VFR BLD AUTO: 30.5 % (ref 35–47)
HGB BLD-MCNC: 9.6 G/DL (ref 11.7–15.7)
IMM GRANULOCYTES # BLD: 0 10E9/L (ref 0–0.4)
IMM GRANULOCYTES NFR BLD: 0.7 %
LYMPHOCYTES # BLD AUTO: 0.2 10E9/L (ref 0.8–5.3)
LYMPHOCYTES NFR BLD AUTO: 15.9 %
MCH RBC QN AUTO: 29 PG (ref 26.5–33)
MCHC RBC AUTO-ENTMCNC: 31.5 G/DL (ref 31.5–36.5)
MCV RBC AUTO: 92 FL (ref 78–100)
MONOCYTES # BLD AUTO: 0.1 10E9/L (ref 0–1.3)
MONOCYTES NFR BLD AUTO: 7.6 %
NEUTROPHILS # BLD AUTO: 1.1 10E9/L (ref 1.6–8.3)
NEUTROPHILS NFR BLD AUTO: 75.8 %
NRBC # BLD AUTO: 0 10*3/UL
NRBC BLD AUTO-RTO: 0 /100
O2/TOTAL GAS SETTING VFR VENT: ABNORMAL %
OVALOCYTES BLD QL SMEAR: ABNORMAL
OXYHGB MFR BLD: 95 % (ref 92–100)
PCO2 BLD: 61 MM HG (ref 35–45)
PH BLD: 7.46 PH (ref 7.35–7.45)
PLATELET # BLD AUTO: 49 10E9/L (ref 150–450)
PLATELET # BLD EST: ABNORMAL 10*3/UL
PO2 BLD: 86 MM HG (ref 80–105)
POTASSIUM SERPL-SCNC: 4.2 MMOL/L (ref 3.4–5.3)
RBC # BLD AUTO: 3.31 10E12/L (ref 3.8–5.2)
SODIUM SERPL-SCNC: 134 MMOL/L (ref 133–144)
WBC # BLD AUTO: 1.5 10E9/L (ref 4–11)

## 2019-05-13 PROCEDURE — 82805 BLOOD GASES W/O2 SATURATION: CPT | Performed by: INTERNAL MEDICINE

## 2019-05-13 PROCEDURE — 25000131 ZZH RX MED GY IP 250 OP 636 PS 637: Performed by: INTERNAL MEDICINE

## 2019-05-13 PROCEDURE — 94799 UNLISTED PULMONARY SVC/PX: CPT

## 2019-05-13 PROCEDURE — 25000132 ZZH RX MED GY IP 250 OP 250 PS 637: Performed by: INTERNAL MEDICINE

## 2019-05-13 PROCEDURE — 25000125 ZZHC RX 250: Performed by: PHYSICIAN ASSISTANT

## 2019-05-13 PROCEDURE — A9270 NON-COVERED ITEM OR SERVICE: HCPCS | Performed by: INTERNAL MEDICINE

## 2019-05-13 PROCEDURE — 25000132 ZZH RX MED GY IP 250 OP 250 PS 637: Performed by: HOSPITALIST

## 2019-05-13 PROCEDURE — 25000128 H RX IP 250 OP 636: Performed by: PHYSICIAN ASSISTANT

## 2019-05-13 PROCEDURE — 36415 COLL VENOUS BLD VENIPUNCTURE: CPT | Performed by: INTERNAL MEDICINE

## 2019-05-13 PROCEDURE — 94640 AIRWAY INHALATION TREATMENT: CPT | Mod: 76

## 2019-05-13 PROCEDURE — 36600 WITHDRAWAL OF ARTERIAL BLOOD: CPT

## 2019-05-13 PROCEDURE — 80048 BASIC METABOLIC PNL TOTAL CA: CPT | Performed by: INTERNAL MEDICINE

## 2019-05-13 PROCEDURE — 25000128 H RX IP 250 OP 636: Performed by: INTERNAL MEDICINE

## 2019-05-13 PROCEDURE — 99239 HOSP IP/OBS DSCHRG MGMT >30: CPT | Performed by: INTERNAL MEDICINE

## 2019-05-13 PROCEDURE — 85025 COMPLETE CBC W/AUTO DIFF WBC: CPT | Performed by: INTERNAL MEDICINE

## 2019-05-13 PROCEDURE — A9270 NON-COVERED ITEM OR SERVICE: HCPCS | Performed by: HOSPITALIST

## 2019-05-13 PROCEDURE — 40000275 ZZH STATISTIC RCP TIME EA 10 MIN

## 2019-05-13 PROCEDURE — 94640 AIRWAY INHALATION TREATMENT: CPT

## 2019-05-13 RX ORDER — FUROSEMIDE 40 MG
40 TABLET ORAL DAILY
Status: DISCONTINUED | OUTPATIENT
Start: 2019-05-14 | End: 2019-05-13 | Stop reason: HOSPADM

## 2019-05-13 RX ORDER — QUETIAPINE FUMARATE 25 MG/1
25-50 TABLET, FILM COATED ORAL 3 TIMES DAILY PRN
DISCHARGE
Start: 2019-05-13 | End: 2019-06-11

## 2019-05-13 RX ORDER — PREDNISONE 20 MG/1
40 TABLET ORAL DAILY
Status: DISCONTINUED | OUTPATIENT
Start: 2019-05-14 | End: 2019-05-13 | Stop reason: HOSPADM

## 2019-05-13 RX ORDER — POLYETHYLENE GLYCOL 3350 17 G/17G
17 POWDER, FOR SOLUTION ORAL 2 TIMES DAILY PRN
DISCHARGE
Start: 2019-05-13 | End: 2019-06-24

## 2019-05-13 RX ORDER — LEVOFLOXACIN 5 MG/ML
250 INJECTION, SOLUTION INTRAVENOUS EVERY 24 HOURS
DISCHARGE
Start: 2019-05-14 | End: 2019-06-11

## 2019-05-13 RX ORDER — AMOXICILLIN 250 MG
1 CAPSULE ORAL 2 TIMES DAILY PRN
DISCHARGE
Start: 2019-05-13

## 2019-05-13 RX ORDER — PREDNISONE 20 MG/1
40 TABLET ORAL DAILY
DISCHARGE
Start: 2019-05-14 | End: 2019-06-11

## 2019-05-13 RX ORDER — LEVOFLOXACIN 5 MG/ML
250 INJECTION, SOLUTION INTRAVENOUS EVERY 24 HOURS
Status: DISCONTINUED | OUTPATIENT
Start: 2019-05-14 | End: 2019-05-13 | Stop reason: HOSPADM

## 2019-05-13 RX ORDER — FUROSEMIDE 20 MG
20 TABLET ORAL
Status: DISCONTINUED | OUTPATIENT
Start: 2019-05-14 | End: 2019-05-13 | Stop reason: HOSPADM

## 2019-05-13 RX ORDER — LEVALBUTEROL 1.25 MG/.5ML
1.25 SOLUTION, CONCENTRATE RESPIRATORY (INHALATION)
DISCHARGE
Start: 2019-05-13 | End: 2019-06-11

## 2019-05-13 RX ADMIN — QUETIAPINE 25 MG: 25 TABLET ORAL at 08:47

## 2019-05-13 RX ADMIN — APIXABAN 5 MG: 5 TABLET, FILM COATED ORAL at 20:22

## 2019-05-13 RX ADMIN — METHYLPREDNISOLONE SODIUM SUCCINATE 62.5 MG: 125 INJECTION, POWDER, FOR SOLUTION INTRAMUSCULAR; INTRAVENOUS at 08:50

## 2019-05-13 RX ADMIN — APIXABAN 5 MG: 5 TABLET, FILM COATED ORAL at 08:47

## 2019-05-13 RX ADMIN — LEVALBUTEROL HYDROCHLORIDE 1.25 MG: 1.25 SOLUTION, CONCENTRATE RESPIRATORY (INHALATION) at 14:52

## 2019-05-13 RX ADMIN — LEVALBUTEROL HYDROCHLORIDE 1.25 MG: 1.25 SOLUTION, CONCENTRATE RESPIRATORY (INHALATION) at 06:59

## 2019-05-13 RX ADMIN — DILTIAZEM HYDROCHLORIDE 180 MG: 180 CAPSULE, EXTENDED RELEASE ORAL at 08:50

## 2019-05-13 RX ADMIN — POLYETHYLENE GLYCOL 3350 17 G: 17 POWDER, FOR SOLUTION ORAL at 15:04

## 2019-05-13 RX ADMIN — FUROSEMIDE 40 MG: 10 INJECTION, SOLUTION INTRAVENOUS at 08:44

## 2019-05-13 RX ADMIN — DILTIAZEM HYDROCHLORIDE 120 MG: 120 CAPSULE, EXTENDED RELEASE ORAL at 08:46

## 2019-05-13 RX ADMIN — METOPROLOL TARTRATE 100 MG: 100 TABLET, FILM COATED ORAL at 20:22

## 2019-05-13 RX ADMIN — QUETIAPINE 25 MG: 25 TABLET ORAL at 20:22

## 2019-05-13 RX ADMIN — LEVALBUTEROL HYDROCHLORIDE 1.25 MG: 1.25 SOLUTION, CONCENTRATE RESPIRATORY (INHALATION) at 20:25

## 2019-05-13 RX ADMIN — METOPROLOL TARTRATE 100 MG: 100 TABLET, FILM COATED ORAL at 08:47

## 2019-05-13 RX ADMIN — LEVALBUTEROL HYDROCHLORIDE 1.25 MG: 1.25 SOLUTION, CONCENTRATE RESPIRATORY (INHALATION) at 10:32

## 2019-05-13 RX ADMIN — LEVOFLOXACIN 500 MG: 5 INJECTION, SOLUTION INTRAVENOUS at 12:45

## 2019-05-13 RX ADMIN — INSULIN GLARGINE 5 UNITS: 100 INJECTION, SOLUTION SUBCUTANEOUS at 09:01

## 2019-05-13 ASSESSMENT — ACTIVITIES OF DAILY LIVING (ADL)
ADLS_ACUITY_SCORE: 20

## 2019-05-13 NOTE — PROGRESS NOTES
[facility]  Hospitalist Progress Note    Assessment & Plan   Carine Rodriguez is a 79 year old female with moderate COPD, obstructive sleep apnea on chronic nocturnal CPAP, atrial fibrillation on chronic anticoagulation, dyslipidemia, chronic CHF, hypertension who was admitted on 5/6/2019 with acute respiratory failure due to COPD exacerbation.     COPD exacerbation with hypercapnea  Obstructive Sleep Apnea  Chest CT did not appear consistent with pneumonitis from immune therapy. On chronic nocturnal CPAP. Thoracentesis done 5/10.   -pulmonology was following. Has signed off.   -reduce stress dose steroid to prednisone 40 mg daily starting 5/14  -continue levofloxacin  -repeat ABG today  -Turned from 4 to 3 L NC at 1 PM today  -continue Xoponex q 4h while awake  -supplemental oxygen with O2 sat goal 92-95%    Adenocarcinoma of the Lung  Pancytopenia  Followed by Dr. Gonsalez from [facility]. Patient with worsening pancytopenia as known side effect of chemo.  -daily CBC  -call made to [facility] 5/13/2019 for consideration of transfer per family request    Hypertension  Atrial fibrillation  Dyslipidemia  Chronic Heart Failure  Managed with metoprolol 100 mg BID, diltiazem 300 mg daily, lasix 40 mg q AM and 20 mg in the afternoon. Appears dry now with additional days of IV lasix diuresis.   -discontinue scheduled BID lasix 40 mg.   -resume PTA doses of lasix  -continue po diltiazem, metoprolol  -continue therapeutic AC with eliquis  -statin    Type 2 Diabetes  Hemoglobin A1c 6.9% on 4/26/19. Worsened with stress dose methylprednisolone for past week.   -continues on lantus 5 units daily.   -corrective dose aspart    Chronic Kidney Disease, stage 3  -avoid nephrotoxins    Constipation  Suspect functional from being nonambulatory and diltiazem side effect.   -prn miralax BID   -ambulate as able    Orders Placed This Encounter      Regular Diet Adult    DVT prophylaxis: on eliquis  Code Status:  "DNR/DNI    Disposition: Not able to be discharged home yet. Family requesting transfer to [facility] for oncology care and COPD treatment. Very loving and supportive daughters involved in care. Placed call to [facility] referral line. Would need medical transport with NC oxygen and family is aware of additional cost for this.     Leola Thomas MD  [phone] (7am - 6pm)  Text Page  ~~~~~~~~~~~~~~~~~~~~~~~~~~~~~~~~~~~~~~~~~~~~~~~  Interval History   \"My mouth is so dry.\"   Patient new to me today. Extensive chart reviewed. There was a meeting with hospice earlier but patient and family now most interested in restorative pathway. They wanted to define probability of her getting back to her baseline, which is very hard to determine. Discussed in great detail the patient's lab values, treatment plan, need to be \"stable enough\" for discharge home with 24 hour private nursing care vs TCU then close follow-up with [facility] for further oncology input. At this point patient does not have functional status to withstand another chemo round, especially given delirium, pancytopenia, poor appetite, generalized weakness.     -Data reviewed today: I reviewed all new labs and imaging results over the last 24 hours. I personally reviewed the telemetry showing afib rate 101.    Physical Exam   Temp: 97.8  F (36.6  C) Temp src: Oral BP: 128/72 Pulse: 101 Heart Rate: 105 Resp: 24 SpO2: 96 % O2 Device: Oxymizer cannula Oxygen Delivery: 4 LPM  Vitals:    05/08/19 0600 05/09/19 0050 05/10/19 0654   Weight: 98.6 kg (217 lb 6 oz) 97.4 kg (214 lb 11.7 oz) 94.1 kg (207 lb 7.3 oz)     Vital Signs with Ranges  Temp:  [93.8  F (34.3  C)-97.8  F (36.6  C)] 97.8  F (36.6  C)  Pulse:  [101] 101  Heart Rate:  [] 105  Resp:  [16-24] 24  BP: (113-144)/(47-72) 128/72  SpO2:  [95 %-99 %] 96 %  I/O last 3 completed shifts:  In: 500 [P.O.:500]  Out: 200 [Urine:200]    Constitutional: Intermittently interactive, closes eyes during conversation  HEENT: " dry mouth, sclerae anicteric  Respiratory: Lungs CTAB, no wheezes or crackles  Cardiovascular: RRR, no murmurs  no LE edema  GI: soft, non-tender, non-distended, normal bowel sounds  Skin/Integument: warm, dry, no acute rashes  Musc: STEPHENS, normal limb strength x 4  Neuro: oriented to self, place, no tremors or focal deficits, speech fluent  Psych: not anxious, +confused about recent events     Medications     Reason anticoagulant not prescribed for atrial fibrillation       - MEDICATION INSTRUCTIONS -         apixaban ANTICOAGULANT  5 mg Oral BID    Followed by     [START ON 5/18/2019] apixaban ANTICOAGULANT  5 mg Oral BID     diltiazem ER  120 mg Oral Daily     diltiazem ER  180 mg Oral Daily     insulin glargine  5 Units Subcutaneous QAM AC     levalbuterol  1.25 mg Nebulization Q4H While awake     [START ON 5/14/2019] levofloxacin  250 mg Intravenous Q24H     methylPREDNISolone  62.5 mg Intravenous Q12H     metoprolol tartrate  100 mg Oral BID     QUEtiapine  25-50 mg Oral TID     sodium chloride (PF)  3 mL Intracatheter Q8H     sodium phosphate  1 enema Rectal Once       Data   Recent Labs   Lab 05/13/19  0807 05/12/19  0818 05/11/19  0418 05/10/19  0654 05/09/19  1223   WBC 1.5* 2.5* 2.3* 2.4*  --    HGB 9.6* 10.7* 10.8* 10.5*  --    MCV 92 93 95 95  --    PLT 49* 80* 117* 123*  --    INR  --   --   --   --  1.04     --  143 144  --    POTASSIUM 4.2  --  4.5 4.5  --    CHLORIDE 91*  --  101 106  --    CO2 41*  --  41* 37*  --    BUN 67*  --  49* 48*  --    CR 1.10* 1.01 0.84 0.89  --    ANIONGAP 2*  --  1* 1*  --    MELANIE 9.0  --  9.2 9.6  --    *  --  215* 217*  --    PROTTOTAL  --   --   --   --  6.7*       Imaging:  No results found for this or any previous visit (from the past 24 hour(s)).

## 2019-05-13 NOTE — PROGRESS NOTES
Nursing note  Pt declined her scheduled Seroquel at the scheduled time, requested to take it at 2000 since she might be transferring to another facility tonight.

## 2019-05-13 NOTE — CONSULTS
"[facility] was consulted to come out and see pt who is being evaluated due to COPD diagnosis. RNElba and Priscilla MCKENZIE met with pt and her family today to discuss hospice program, philosophy, services, DME and medication coverage. Pt was alert and able to participate in this conversation today. However, pt easily tired and frequently closing her eyes. Pt's three daughters (Nina, Carlos Alberto and Soco) and son in law Jose Miguel were all present and asked lots of questions about hospice program on pt's behalf. Daughter Nina stated early on in the conversation that they wanted to learn more about hospice program and their options. However, pt and family stated they were not clear on what was \"going on with pt's body\" and wanted a better understand of what path would be most appropriate. SW talked through various placement options, private duty agencies, health care directive, POLST and code status. Pt states she would like to update her HCD and was hoping to do so while still in the hospital. Family was unclear of a discharge date at this time and all hoping to speak with MD today. Pt is from [place] and family stated pt may return there as well. Explained that  Hospice does nto service that area but that the unit SW could assist in providing a list of hospice agencies for that area. MAGALY provided pt with written information on hospice program as well as instructions on how to get a hold of the referral line if/when pt is ready for admission in the [facility] area. MAGALY did update unit SW'er Kelley on pt visit and family plan moving forward. MAGALY also passed along the request to update her health care directive during this hospitalization as well.   "

## 2019-05-13 NOTE — PLAN OF CARE
Patient's orientation fluctuates, at times she is awake and orientedX4. Other times she is somnolent and confused, oriented to self only. VSS on 2L O2. Comfort cares maintained, assessment deferred. Up with assist of 1 to bedside commode. Purewick in place, currently functioning well. Regular diet, poor appetite 50 mg seroquel given at bedtime for sleep. Patient rested well most of the night. Daughter at bedside. Plan for hospice consult today with discharge placement pending.

## 2019-05-13 NOTE — PROGRESS NOTES
SW:  D: [facility] following - no consents signed. MAGALY paged hospitalist informing of family desire to discuss pt's condition and complete POLST. MAGALY informed charge RN of desire for HCD and she has entered consult for spiritual health. No firm discharge plan at this time.    P: MAGALY will continue to follow for discharge planning.     MANOLO Downey

## 2019-05-13 NOTE — PLAN OF CARE
Alert to self only. Somnolent but occasionally awakes and is responsive. VSS on 2.5L O2. Lung sounds diminished. Shallow breathing. Bowel sounds active. Adequate urine output. Purewick in place. Denies abdominal discomfort- per family no edema was given, Ambulates assist x1. Tolerating regular diet w/ poor appetite. Denies pain. Seroquel given x1 with good relief.

## 2019-05-13 NOTE — PROGRESS NOTES
Received a call from Dr. Thomas that patient has been accepted at [facility], phone number [phone] given. I called and spoke to Patricia at this number and confirmed acceptance by Dr. Ramirez Alcala and that patient will go to [address] room [address]281 at [facility] at [address] [place] [address]. The unit phone number for report is [phone].    [facility] was called and I spoke to [place]. [facility] stretcher ride is for 0530 on 5/14/2019. Accepted ride time.

## 2019-05-13 NOTE — CONSULTS
9:30am Hospice Consult completed for this 79 year old female with diagnosis of COPD. Those present were writer, Priscilla Vanegas ,  pt's dtrs Carlos Alberto Wood and Soco with son in law Gillespie present. Discussion completed regarding Hospice benefit/philosophy. Pt participated in consult but was easily fatigued with shortness of breath noted, oxygen supplemental in place. 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 Family requesting more information on pt's disease process today. No discharge date has been determined. Conversation regarding the different options for pt, hospice being provided in pt's home with privately paid caregivers, SNF or Hospice home. Pt's residence is in [facility] to provide information on hospice programs in that area as [facility] is out of the [facility] service area.          Pt as admitted to hospital on 5/6/2019 for Hypoxia symptom/ treatment of COPD exacerbation. Pt with diagnosis of Adenocarcinoma of Rt lung. Other diagnoses of COPD, A Fib, Chronic Anticoagulant, CDK, HTN. Radiation completed 11/2018 through [facility] with Chemotherapy recently starting 5/4/2019. Pt has been oxygen dependent with an increase in that need the last several weeks.        Have discussed with Kelley MCKENZIE the family's request for a Health Care Directive to be completed along with DNR DNI documentation. [facility] phone number given to pt's family to call with any additional questions or concerns.         Thank you for this referral. Please call with any questions or concerns at [phone].   Elba Mcleod RN  Admission Clinician

## 2019-05-13 NOTE — TREATMENT PLAN
Nursing note  [facility] called around 1840 and said they will be able to transport pt to [facility] at 2100. The writer called to the Hospital at 1844 and give Luisana RN report. Luisana told the writer she is not going to be the pt nurse,that pt nurse will be coming at 1900 shift, but she can take report.

## 2019-05-13 NOTE — PROGRESS NOTES
Chart reviewed for LOS.   Note pt is comfort care.  No interventions at this time, please consult if our services are needed.    Sarah Hendrix, GRACE  Pager [phone]

## 2019-05-13 NOTE — DISCHARGE SUMMARY
[facility]  Discharge Summary  Hospitalist    Date of Admission:  5/6/2019  Date of Discharge:  5/13/2019 to [facility] inpatient unit  Discharging Provider: Leola Thomas MD    Discharge Diagnoses   Acute respiratory failure due to COPD exacerbation with hypercapnea  Obstructive Sleep Apnea  Adenocarcinoma of the Lung  Pancytopenia  Hypertension  Atrial fibrillation  Dyslipidemia  Chronic Heart Failure  Type 2 Diabetes  Chronic Kidney Disease, stage 3  Constipation    History of Present Illness   Carine Rodriugez is a very pleasant 79 year old female with moderate COPD, obstructive sleep apnea on chronic nocturnal CPAP, atrial fibrillation on chronic anticoagulation, dyslipidemia, chronic CHF, hypertension who was admitted on 5/6/2019 with acute respiratory failure due to COPD exacerbation.  Please see admission H&P for complete details.     Hospital Course   Carine Rodriguez was admitted on 5/6/2019.  The following problems were addressed during her hospitalization:    Acute Hypoxemic Respiratory Failure  COPD exacerbation with hypercapnea  Obstructive Sleep Apnea  Right pleural effusion  Chest CT did not appear consistent with pneumonitis from immune therapy. On chronic nocturnal CPAP. Thoracentesis done 5/10, pleural effusion protein of 2.1 suggestive of transudate.   -pulmonology was following. Has signed off.   -reduce stress dose steroid to prednisone 40 mg daily starting 5/14  -continue levofloxacin  -repeat ABG today (see below)  -Turned from 4 to 3 L NC at 1 PM today. Wean as tolerated during the day but will need CPAP or BIPAP at night.   -continue Xoponex q 4h while awake  -supplemental oxygen with O2 sat goal 92-95%  Recent Labs   Lab 05/13/19  1253 05/09/19  0810   PH 7.46* 7.27*   PO2 86 75*   PCO2 61* 71*   HCO3 43* 33*   JONI 17.2 4.2     Adenocarcinoma of the Lung  Pancytopenia  Followed by Dr. Gonsalez from [facility]. Patient with worsening pancytopenia as known side  "effect of chemo.  -daily CBC  -call made to [facility] 5/13/2019 for consideration of transfer per family request  -Discussed with P to P at [facility]. Referred to Dr. Alcala from ICU who graciously agreed to accept this patient in transfer.    Had goals of care discussion with patient and her 3 craig daughters. It seems information about hospice was requested and this was provided. After further discussion, the patient wishes to pursue restorative pathway and continue current treatments. If there is \"no hope\" of returning to her previous level of functioning (I.e. living independently in her home) she wishes for this to be made clear by the care team. I was unable to provide much detail or guidance around the cancer treatment but that at this time she has pancytopenia and does not have the functional status to proceed with additional chemotherapy. This may change if she recovers from this respiratory failure so will defer this guidance to the primary oncology team.     Hypertension  Atrial fibrillation  Dyslipidemia  Chronic Heart Failure  Managed with metoprolol 100 mg BID, diltiazem 300 mg daily, lasix 40 mg q AM and 20 mg in the afternoon. Appears clinically euvolemic and complains of dry mouth now with additional days of IV lasix diuresis.   -discontinue scheduled BID lasix 40 mg IV.   -resume PTA doses of lasix  -continue po diltiazem, metoprolol  -continue therapeutic AC with eliquis  -statin     Type 2 Diabetes  Hemoglobin A1c 6.9% on 4/26/19. Worsened with stress dose methylprednisolone for past week.   -continues on lantus 5 units daily.   -corrective dose aspart     Chronic Kidney Disease, stage 3  -avoid nephrotoxins     Constipation  Suspect functional from being nonambulatory and diltiazem side effect.   -prn miralax BID   -ambulate as able    Leola Thomas MD  ~~~~~~~~~~~~~~~~~~~~~~~~~~~~~~~~~~~~~~~~~~~~~~~~~~~~~~~~~~~    Pending Results   These results will be followed up by Hospitalist.  Unresulted " Labs Ordered in the Past 30 Days of this Admission     Date and Time Order Name Status Description    5/9/2019 1111 Cytology non gyn In process     5/9/2019 1111 Fluid Culture Aerobic Bacterial Preliminary           Code Status   Full Code       Primary Care Physician   Collette Meza    Physical Exam   Temp: 97.7  F (36.5  C) Temp src: Oral BP: 125/62 Pulse: 116 Heart Rate: 74 Resp: 24 SpO2: 98 % O2 Device: Oxymizer cannula Oxygen Delivery: 4 LPM  Vitals:    05/08/19 0600 05/09/19 0050 05/10/19 0654   Weight: 98.6 kg (217 lb 6 oz) 97.4 kg (214 lb 11.7 oz) 94.1 kg (207 lb 7.3 oz)     Vital Signs with Ranges  Temp:  [97.4  F (36.3  C)-97.8  F (36.6  C)] 97.7  F (36.5  C)  Pulse:  [101-116] 116  Heart Rate:  [] 74  Resp:  [16-24] 24  BP: (113-144)/(56-72) 125/62  SpO2:  [95 %-99 %] 98 %  I/O last 3 completed shifts:  In: 600 [P.O.:500; I.V.:100]  Out: 200 [Urine:200]    Constitutional: Alert, NAD, pleasant and cooperative  Neuro: oriented to self and place. Intermittently confused but answers questions appropriately.       Discharge Disposition   Transferred to intensive care at [facility]  Condition at discharge: Stable    Consultations This Hospital Stay   CARE COORDINATOR IP CONSULT  RESPIRATORY CARE IP CONSULT  SOCIAL WORK IP CONSULT  PHYSICAL THERAPY ADULT IP CONSULT  OCCUPATIONAL THERAPY ADULT IP CONSULT  PHARMACY IP CONSULT  PHARMACY TO DOSE HEPARIN  PULMONARY IP CONSULT  SOCIAL WORK IP CONSULT  SPIRITUAL HEALTH SERVICES IP CONSULT    Time Spent on this Encounter   ILeola, personally saw the patient today and spent 35 minutes discharging this patient.    Discharge Orders      Follow Up and recommended labs and tests    To be transferred to [facility] ICU     Reason for your hospital stay    Acute respiratory failure due to COPD exacerbation, heart failure     Glucose monitor nursing POCT    Before meals and at bedtime     Intake and output    Every shift     Daily weights    Call  Provider for weight gain of more than 2 pounds per day or 5 pounds per week.     IV access    Peripheral IV. Also has a port.     Activity - Up with nursing assistance     Full Code     Oxygen - Nasal cannula    2-4 Lpm by nasal cannula to keep O2 sats between 92-95%. Should not go above 95% due to CO2 retention.     BIPAP treatment    CPAP per home settings, BiPAP if needed     Advance Diet as Tolerated    Follow this diet upon discharge:       Regular Diet Adult     Discharge Medications   Current Discharge Medication List      START taking these medications    Details   hypromellose-dextran (ARTIFICAL TEARS) 0.1-0.3 % ophthalmic solution Apply 2 drops to eye every hour as needed for dry eyes    Associated Diagnoses: Dry eyes      insulin glargine (LANTUS SOLOSTAR PEN) 100 UNIT/ML pen Inject 5 Units Subcutaneous every morning (before breakfast)    Associated Diagnoses: Type 2 diabetes mellitus with diabetic nephropathy, unspecified whether long term insulin use (H)      levalbuterol (XOPENEX CONC) 1.25 MG/0.5ML neb solution Take 0.5 mLs (1.25 mg) by nebulization every 4 hours (while awake)    Associated Diagnoses: COPD exacerbation (H)      levofloxacin (LEVAQUIN) 250 MG/50ML infusion Inject 50 mLs (250 mg) into the vein every 24 hours    Associated Diagnoses: COPD exacerbation (H)      polyethylene glycol (MIRALAX/GLYCOLAX) packet Take 17 g by mouth 2 times daily as needed for constipation    Associated Diagnoses: Other constipation      predniSONE (DELTASONE) 20 MG tablet Take 40 mg by mouth daily.    Comments: Please include the quantity of tablets and (strength) per dose in sig.    Associated Diagnoses: COPD exacerbation (H)      QUEtiapine (SEROQUEL) 25 MG tablet Take 1-2 tablets (25-50 mg) by mouth 3 times daily as needed (agitation, delirium)    Associated Diagnoses: Subacute delirium      senna-docusate (SENOKOT-S/PERICOLACE) 8.6-50 MG tablet Take 1 tablet by mouth 2 times daily as needed for constipation     Associated Diagnoses: Other constipation         CONTINUE these medications which have NOT CHANGED    Details   acetaminophen (TYLENOL) 500 MG tablet Take 500-1,000 mg by mouth every 6 hours as needed for mild pain      albuterol (PROAIR HFA/PROVENTIL HFA/VENTOLIN HFA) 108 (90 Base) MCG/ACT inhaler Inhale 2 puffs into the lungs every 4 hours as needed for shortness of breath / dyspnea or wheezing      apixaban ANTICOAGULANT (ELIQUIS) 5 MG tablet Take 5 mg by mouth 2 times daily      atorvastatin (LIPITOR) 80 MG tablet Take 80 mg by mouth every evening      cetirizine (ZYRTEC) 10 MG tablet Take 10 mg by mouth daily      diltiazem ER COATED BEADS (CARDIZEM CD/CARTIA XT) 300 MG 24 hr capsule Take 300 mg by mouth daily      folic acid (FOLVITE) 400 MCG tablet Take 400 mcg by mouth daily      !! furosemide (LASIX) 40 MG tablet Take 40 mg by mouth every morning      !! furosemide (LASIX) 40 MG tablet Take 20 mg by mouth daily (with lunch) At 1200      Ipratropium-Albuterol (COMBIVENT RESPIMAT)  MCG/ACT inhaler Inhale 1 puff into the lungs 4 times daily as needed for shortness of breath / dyspnea or wheezing      metoprolol tartrate (LOPRESSOR) 100 MG tablet Take 100 mg by mouth 2 times daily      ondansetron (ZOFRAN) 8 MG tablet Take 8 mg by mouth every 8 hours as needed for nausea (Vomiting)      prochlorperazine (COMPAZINE) 10 MG tablet Take 10 mg by mouth every 6 hours as needed for nausea or vomiting       !! - Potential duplicate medications found. Please discuss with provider.        Allergies   Allergies   Allergen Reactions     Lisinopril      Anaphylaxis. Tongue swelling.      Contrast Dye      Iodinated contrast, oral and IV, rash     Amlodipine      Per [facility]       Bupropion      Per [facility]       Clarithromycin      Hives, 2010. Per [facility]       Indomethacin      Propranolol      Penicillins Rash     Sulfa Drugs Rash     Data

## 2019-05-13 NOTE — PLAN OF CARE
Nursing note  Pt a/o x 4, but forgetful. Pt denies any pain. Incontinent to urine, pure wick in place. VSS, except pt running sinus tachy. Plt count is 49 and  this morning MD notified. Pt denies any n/v. Pt c/o dizziness when HOB is flat. Family in the room at all time. Pt on 4 liters of oximyzer. Will continue to monitor.

## 2019-05-13 NOTE — ACP (ADVANCE CARE PLANNING)
"SPIRITUAL HEALTH SERVICES Progress Note  [facility]    F/U visit re: HCD.  Pt and two of her daughters dicussed pt's desire to revise her current HCD (which SH does not see in Epic). Pt said that her previously written HCD states \"no extraordinary measures.\"  Pt said she now is open to \"extraordinary measures\" (pt mentioned \"intubation\") if medical staff agree that such an intervention can/could be a bridge to better health in the situation of an acute event. Pt said she doesn't want to rule out interventions that might be able to prolong her life and its quality.   Pt's dtrs said they would write up a HCD with pt's exact words and then will get it witnessed or notarized.  SH affirmed pt's discussion of her wishes with her daughters and her daughters' commitment to honor pt's wishes.                                                                                                                                            Jonas Peguero M.Div., [facility]  Staff   Pager [phone]    "

## 2019-05-14 LAB — INTERPRETATION ECG - MUSE: NORMAL

## 2019-05-14 NOTE — DISCHARGE SUMMARY
Pt left the unit with EMS on a cart. Pt connected to 4L O2 via oximyzer. No pain. Paperwork given to EMS and copy given to daughter. Belongings kept with daughter. Last medications given before leaving - eliquis, metoprolol, and seroquel. IV left in place, pt will be transferred to another medical facility.

## 2019-05-15 ENCOUNTER — DOCUMENTATION ONLY (OUTPATIENT)
Dept: OTHER | Facility: CLINIC | Age: 79
End: 2019-05-15

## 2019-05-15 LAB
BACTERIA SPEC CULT: NO GROWTH
SPECIMEN SOURCE: NORMAL

## 2019-05-16 LAB — COPATH REPORT: NORMAL

## 2019-06-10 PROBLEM — R93.89 ABNORMAL FINDINGS ON DIAGNOSTIC IMAGING OF OTHER SPECIFIED BODY STRUCTURES: Status: ACTIVE | Noted: 2018-03-04

## 2019-06-10 PROBLEM — I48.91 ATRIAL FIBRILLATION WITH RVR (H): Status: ACTIVE | Noted: 2018-09-28

## 2019-06-10 PROBLEM — G47.33 OBSTRUCTIVE SLEEP APNEA SYNDROME IN ADULT: Status: ACTIVE | Noted: 2018-12-26

## 2019-06-10 PROBLEM — C34.90 ADENOCARCINOMA OF LUNG (H): Status: ACTIVE | Noted: 2018-04-26

## 2019-06-10 PROBLEM — C34.90 MALIGNANT NEOPLASM OF LUNG (H): Status: ACTIVE | Noted: 2018-04-05

## 2019-06-10 PROBLEM — R06.09 DYSPNEA ON EXERTION: Status: ACTIVE | Noted: 2019-04-23

## 2019-06-10 PROBLEM — I95.1 ORTHOSTATIC HYPOTENSION: Status: ACTIVE | Noted: 2019-04-30

## 2019-06-10 PROBLEM — D61.818 PANCYTOPENIA (H): Status: ACTIVE | Noted: 2019-06-10

## 2019-06-10 PROBLEM — R09.02 HYPOXIA: Status: ACTIVE | Noted: 2018-03-02

## 2019-06-10 PROBLEM — N18.30 HYPERTENSIVE KIDNEY DISEASE, STAGE III (H): Status: ACTIVE | Noted: 2019-03-27

## 2019-06-10 PROBLEM — N17.9 ACUTE RENAL FAILURE (H): Status: ACTIVE | Noted: 2019-03-27

## 2019-06-10 PROBLEM — R60.0 BILATERAL LEG EDEMA: Status: ACTIVE | Noted: 2019-04-26

## 2019-06-10 PROBLEM — I12.9 HYPERTENSIVE KIDNEY DISEASE, STAGE III (H): Status: ACTIVE | Noted: 2019-03-27

## 2019-06-10 PROBLEM — J95.811 IATROGENIC PNEUMOTHORAX: Status: ACTIVE | Noted: 2019-03-25

## 2019-06-10 RX ORDER — QUETIAPINE FUMARATE 25 MG/1
12.5 TABLET, FILM COATED ORAL AT BEDTIME
COMMUNITY
End: 2019-06-11

## 2019-06-10 RX ORDER — LATANOPROST 50 UG/ML
1 SOLUTION/ DROPS OPHTHALMIC DAILY
COMMUNITY

## 2019-06-10 RX ORDER — SUCRALFATE 1 G/1
1 TABLET ORAL 4 TIMES DAILY
COMMUNITY

## 2019-06-10 RX ORDER — PANTOPRAZOLE SODIUM 40 MG/1
1 FOR SUSPENSION ORAL 2 TIMES DAILY
COMMUNITY

## 2019-06-10 RX ORDER — TRAMADOL HYDROCHLORIDE 50 MG/1
50 TABLET ORAL EVERY 4 HOURS PRN
COMMUNITY
End: 2019-07-15

## 2019-06-10 RX ORDER — LORAZEPAM 0.5 MG/1
0.5 TABLET ORAL 3 TIMES DAILY
COMMUNITY
Start: 2019-06-10 | End: 2019-06-11

## 2019-06-10 RX ORDER — ACETAMINOPHEN 325 MG/1
650 TABLET ORAL EVERY 6 HOURS PRN
COMMUNITY

## 2019-06-11 ENCOUNTER — NURSING HOME VISIT (OUTPATIENT)
Dept: GERIATRICS | Facility: CLINIC | Age: 79
End: 2019-06-11
Payer: MEDICARE

## 2019-06-11 VITALS
HEART RATE: 97 BPM | BODY MASS INDEX: 31.49 KG/M2 | WEIGHT: 189 LBS | RESPIRATION RATE: 18 BRPM | TEMPERATURE: 96.1 F | SYSTOLIC BLOOD PRESSURE: 106 MMHG | HEIGHT: 65 IN | OXYGEN SATURATION: 92 % | DIASTOLIC BLOOD PRESSURE: 62 MMHG

## 2019-06-11 DIAGNOSIS — J44.9 CHRONIC OBSTRUCTIVE PULMONARY DISEASE, UNSPECIFIED COPD TYPE (H): ICD-10-CM

## 2019-06-11 DIAGNOSIS — K26.9 DUODENAL ULCERATION: ICD-10-CM

## 2019-06-11 DIAGNOSIS — R53.81 PHYSICAL DECONDITIONING: ICD-10-CM

## 2019-06-11 DIAGNOSIS — G47.33 OSA (OBSTRUCTIVE SLEEP APNEA): ICD-10-CM

## 2019-06-11 DIAGNOSIS — J96.00 ACUTE RESPIRATORY FAILURE, UNSPECIFIED WHETHER WITH HYPOXIA OR HYPERCAPNIA (H): Primary | ICD-10-CM

## 2019-06-11 DIAGNOSIS — I10 ESSENTIAL HYPERTENSION: ICD-10-CM

## 2019-06-11 DIAGNOSIS — D64.9 ANEMIA, UNSPECIFIED TYPE: ICD-10-CM

## 2019-06-11 DIAGNOSIS — C34.92 ADENOCARCINOMA OF LEFT LUNG (H): ICD-10-CM

## 2019-06-11 DIAGNOSIS — N18.30 CHRONIC RENAL INSUFFICIENCY, STAGE III (MODERATE) (H): ICD-10-CM

## 2019-06-11 DIAGNOSIS — I48.91 ATRIAL FIBRILLATION WITH RVR (H): ICD-10-CM

## 2019-06-11 PROBLEM — E78.5 DYSLIPIDEMIA: Status: ACTIVE | Noted: 2019-06-11

## 2019-06-11 PROBLEM — K59.00 CONSTIPATION: Status: ACTIVE | Noted: 2019-06-11

## 2019-06-11 PROBLEM — D62 ANEMIA DUE TO BLOOD LOSS, ACUTE: Status: ACTIVE | Noted: 2019-06-11

## 2019-06-11 PROBLEM — E11.9 TYPE 2 DIABETES MELLITUS (H): Status: ACTIVE | Noted: 2019-06-11

## 2019-06-11 PROBLEM — I50.9 CHF (CONGESTIVE HEART FAILURE) (H): Status: ACTIVE | Noted: 2019-06-11

## 2019-06-11 PROCEDURE — 99207 ZZC CDG-MDM COMPONENT: MEETS LOW - DOWN CODED: CPT | Performed by: NURSE PRACTITIONER

## 2019-06-11 PROCEDURE — 99309 SBSQ NF CARE MODERATE MDM 30: CPT | Performed by: NURSE PRACTITIONER

## 2019-06-11 ASSESSMENT — MIFFLIN-ST. JEOR: SCORE: 1338.18

## 2019-06-11 NOTE — PROGRESS NOTES
[place] [facility]  PRIMARY CARE PROVIDER AND CLINIC:  Collette Meza MD, [facility] [address] / [facility] *  Chief Complaint   Patient presents with     Hospital F/U     TCU     [place] Medical Record Number:  [id number]  Place of Service where encounter took place:  [facility] [[id number]]    Carine Rodriguez  is a 79 year old  (1940), admitted to the above facility from  [facility]. Hospital stay 5/6/19 through 5/13/19..  Admitted to this facility for  rehab, medical management and nursing care.    HPI:    HPI information obtained from: facility chart records, facility staff, patient report and [facility] chart review.     Brief Summary of Hospital Course: pt went to [facility] from 5/6/19 5/6 to 5/13/19 for acute RESP failure and copd exacerbation with hypercapnia,until sent to [facility] per family request as her primary Oncology team was there, on 513/19.    please see [facility] notes for [facility] stay.  She had worsening pancytopenia and decline.  She was diagnosed in Nov 2018 with adenocarcinoma and underwent palliative chemo at that time. She was briefly in the ICU and on BIPAP at [facility], she was found ob to be anemic with Hgb on 6.6 and a plts ct of 17 and was transfused. She had hematochezia and was found to have a duodenal ulcer for which she got IV PPI, she then had dental pain in Lt jaw, a tooth was broken and it was pulled out on 6/6/19.  She was taken off her DOAC due to the anemia an GIB. please see [facility] notes for further data.       CODE STATUS/ADVANCE DIRECTIVES DISCUSSION:   DNR / DNI, selective    Patient's living condition: lives alone  ALLERGIES: Lisinopril; Contrast dye; Amlodipine; Bupropion; Clarithromycin; Indomethacin; Propranolol; Penicillins; and Sulfa drugs  PAST MEDICAL HISTORY:  has a past medical history of Cancer (H), COPD (chronic obstructive pulmonary disease) (H), and  Hypertension.  PAST SURGICAL HISTORY:   has a past surgical history that includes biopsy.  FAMILY HISTORY: family history is not on file.  SOCIAL HISTORY:   has an unknown smoking status. She has never used smokeless tobacco.    Post Discharge Medication Reconciliation Status: discharge medications reconciled and changed, per note/orders (see AVS)     Current Outpatient Medications   Medication Sig Dispense Refill     acetaminophen (TYLENOL) 325 MG tablet Take 650 mg by mouth every 6 hours as needed for mild pain       albuterol (PROAIR HFA/PROVENTIL HFA/VENTOLIN HFA) 108 (90 Base) MCG/ACT inhaler Inhale 2 puffs into the lungs every 4 hours as needed for shortness of breath / dyspnea or wheezing       atorvastatin (LIPITOR) 80 MG tablet Take 80 mg by mouth every evening       cetirizine (ZYRTEC) 10 MG tablet Take 10 mg by mouth daily       diltiazem ER COATED BEADS (CARDIZEM CD/CARTIA XT) 300 MG 24 hr capsule Take 300 mg by mouth daily       fluticasone-salmeterol (ADVAIR) 250-50 MCG/DOSE inhaler Inhale 1 puff into the lungs 2 times daily       folic acid (FOLVITE) 400 MCG tablet Take 400 mcg by mouth daily       furosemide (LASIX) 40 MG tablet Take 40 mg by mouth every morning       furosemide (LASIX) 40 MG tablet Take 20 mg by mouth daily (with lunch) 1/2 of a 40mg tab--take 20mg At 1200       Ipratropium-Albuterol (COMBIVENT RESPIMAT)  MCG/ACT inhaler Inhale 1 puff into the lungs 4 times daily as needed for shortness of breath / dyspnea or wheezing       latanoprost (XALATAN) 0.005 % ophthalmic solution Place 1 drop into both eyes daily       metoprolol tartrate (LOPRESSOR) 100 MG tablet Take 50 mg by mouth 2 times daily        pantoprazole sodium (PROTONIX) 40 MG packet Take 1 packet by mouth 2 times daily       polyethylene glycol (MIRALAX/GLYCOLAX) packet Take 17 g by mouth 2 times daily as needed for constipation (Patient taking differently: Take 17 g by mouth daily as needed for constipation )        "senna-docusate (SENOKOT-S/PERICOLACE) 8.6-50 MG tablet Take 1 tablet by mouth 2 times daily as needed for constipation       sucralfate (CARAFATE) 1 GM tablet Take 1 g by mouth 4 times daily       tiotropium (SPIRIVA RESPIMAT) 2.5 MCG/ACT inhaler Inhale 2 puffs into the lungs daily       traMADol (ULTRAM) 50 MG tablet Take 50 mg by mouth every 4 hours as needed for severe pain       apixaban ANTICOAGULANT (ELIQUIS) 5 MG tablet Take 5 mg by mouth 2 times daily       hypromellose-dextran (ARTIFICAL TEARS) 0.1-0.3 % ophthalmic solution Apply 2 drops to eye every hour as needed for dry eyes (Patient not taking: Reported on 6/11/2019)        TODAY DURING EXAM HPI and ROS:  No CP, SOB, Cough, dizziness, fevers, chills, HA, N/V, dysuria or Bowel Abnormalities. Appetite is fair.  No pain.    Vitals:  /62   Pulse 97   Temp 96.1  F (35.6  C)   Resp 18   Ht 1.651 m (5' 5\")   Wt 85.7 kg (189 lb)   SpO2 92%   BMI 31.45 kg/m    Exam:  GENERAL APPEARANCE:  Alert, in no distress, oriented, cooperative  ENT:  Mouth and posterior oropharynx normal, moist mucous membranes, normal hearing acuity  EYES:  EOM, conjunctivae, lids, pupils and irises normal  NECK:  No adenopathy,masses or thyromegaly  RESP:  lungs clear to auscultation but decreased mildly.  CV:  Palpation and auscultation of heart done , IRRR, no murmur, rub, or gallop, trace pedal pitting edema, +2 pedal pulses  ABDOMEN:  normal bowel sounds, soft, nontender, no hepatosplenomegaly or other masses  M/S:   STEPHENS Equally, up with assist  SKIN:  Inspection of skin and subcutaneous tissue baseline  NEURO:   Cranial nerves 2-12 are normal tested and grossly at patient's baseline  PSYCH:  oriented X 3, normal insight, judgement and memory, ?memory impaired     Lab/Diagnostic data:  see [place] notes:  Hgb on 6/6 was 7.4, Plts 3134 K, BMP WNL except K 3.5,  GFR 38    ASSESSMENT / PLAN:  (J96.00) Acute respiratory failure, unspecified whether with hypoxia or hypercapnia " (H)  (primary encounter diagnosis)   (C34.92) Adenocarcinoma of left lung (H)  Comment/Plan: off O2, uses CPAP, has appt 6/18 with [place] for 2nd dose of Pembrolizumab--family will check if [place] wants to delay if in rehab or go for appt, will have labs also.    (G47.33) KIJNAL (obstructive sleep apnea)   (J44.9) Chronic obstructive pulmonary disease, unspecified COPD type (H)  Comment/Plan: cont meds, POC , CPAP, monitor.    (D64.9) Anemia, unspecified type   (K26.9) Duodenal ulceration  Comment/Plan: check Hgb in am. Anemia also thought to be from the Chemo and sudden Bone marrow suppression    CV ISSUES:  (I10) Essential hypertension   (I48.91) Atrial fibrillation with RVR (H)  Comment/Plan: cont same meds, hold parameters for CCB and BB, monitor.  Apixaban was stopped about may 16th--cannot find exact dates in [place] notes.  Will not be restarted until ok with oncology--~~ 1 month per [place] notes    (N18.3) Chronic renal insufficiency, stage III (moderate) (H)  Comment/Plan: checking BMP in am    (R53.81) Physical deconditioning  Comment/Plan: PT OT     [place] BLUE team ONCOLOGY: PHONE  [phone]  AND FAX [phone]         Electronically signed by:  BLAIR Davis CNP

## 2019-06-12 ENCOUNTER — RECORDS - HEALTHEAST (OUTPATIENT)
Dept: LAB | Facility: CLINIC | Age: 79
End: 2019-06-12

## 2019-06-12 ENCOUNTER — TRANSFERRED RECORDS (OUTPATIENT)
Dept: HEALTH INFORMATION MANAGEMENT | Facility: CLINIC | Age: 79
End: 2019-06-12

## 2019-06-12 VITALS
HEART RATE: 107 BPM | SYSTOLIC BLOOD PRESSURE: 120 MMHG | WEIGHT: 185 LBS | TEMPERATURE: 96 F | OXYGEN SATURATION: 92 % | RESPIRATION RATE: 18 BRPM | BODY MASS INDEX: 30.82 KG/M2 | HEIGHT: 65 IN | DIASTOLIC BLOOD PRESSURE: 63 MMHG

## 2019-06-12 LAB
ANION GAP SERPL CALCULATED.3IONS-SCNC: 11 MMOL/L (ref 5–18)
ANION GAP SERPL CALCULATED.3IONS-SCNC: 11 MMOL/L (ref 5–18)
BUN SERPL-MCNC: 21 MG/DL (ref 8–28)
BUN SERPL-MCNC: 21 MG/DL (ref 8–28)
CALCIUM SERPL-MCNC: 8.9 MG/DL (ref 8.5–10.5)
CALCIUM SERPL-MCNC: 8.9 MG/DL (ref 8.5–10.5)
CHLORIDE BLD-SCNC: 98 MMOL/L (ref 98–107)
CHLORIDE SERPLBLD-SCNC: 98 MMOL/L (ref 98–107)
CO2 SERPL-SCNC: 29 MMOL/L (ref 22–31)
CO2 SERPL-SCNC: 29 MMOL/L (ref 22–31)
CREAT SERPL-MCNC: 1.45 MG/DL (ref 0.6–1.1)
CREAT SERPL-MCNC: 1.45 MG/DL (ref 0.6–1.1)
GFR SERPL CREATININE-BSD FRML MDRD: 35 ML/MIN/1.73M2
GFR SERPL CREATININE-BSD FRML MDRD: 35 ML/MIN/1.73M2
GLUCOSE BLD-MCNC: 97 MG/DL (ref 70–125)
GLUCOSE SERPL-MCNC: 97 MG/DL (ref 70–125)
HEMOGLOBIN: 7.5 G/DL (ref 12–16)
HGB BLD-MCNC: 7.5 G/DL (ref 12–16)
POTASSIUM BLD-SCNC: 2.7 MMOL/L (ref 3.5–5)
POTASSIUM SERPL-SCNC: 2.7 MMOL/L (ref 3.5–5)
SODIUM SERPL-SCNC: 138 MMOL/L (ref 136–145)
SODIUM SERPL-SCNC: 138 MMOL/L (ref 136–145)

## 2019-06-12 ASSESSMENT — MIFFLIN-ST. JEOR: SCORE: 1320.03

## 2019-06-13 ENCOUNTER — RECORDS - HEALTHEAST (OUTPATIENT)
Dept: LAB | Facility: CLINIC | Age: 79
End: 2019-06-13

## 2019-06-13 ENCOUNTER — NURSING HOME VISIT (OUTPATIENT)
Dept: GERIATRICS | Facility: CLINIC | Age: 79
End: 2019-06-13
Payer: MEDICARE

## 2019-06-13 ENCOUNTER — DOCUMENTATION ONLY (OUTPATIENT)
Dept: OTHER | Facility: CLINIC | Age: 79
End: 2019-06-13

## 2019-06-13 ENCOUNTER — AMBULATORY - HEALTHEAST (OUTPATIENT)
Dept: OTHER | Facility: CLINIC | Age: 79
End: 2019-06-13

## 2019-06-13 DIAGNOSIS — D64.9 ANEMIA, UNSPECIFIED TYPE: ICD-10-CM

## 2019-06-13 DIAGNOSIS — J96.00 ACUTE RESPIRATORY FAILURE, UNSPECIFIED WHETHER WITH HYPOXIA OR HYPERCAPNIA (H): Primary | ICD-10-CM

## 2019-06-13 DIAGNOSIS — N18.30 CHRONIC RENAL INSUFFICIENCY, STAGE III (MODERATE) (H): ICD-10-CM

## 2019-06-13 DIAGNOSIS — I10 ESSENTIAL HYPERTENSION: ICD-10-CM

## 2019-06-13 DIAGNOSIS — K26.9 DUODENAL ULCERATION: ICD-10-CM

## 2019-06-13 DIAGNOSIS — I48.91 ATRIAL FIBRILLATION WITH RVR (H): ICD-10-CM

## 2019-06-13 DIAGNOSIS — R53.81 PHYSICAL DECONDITIONING: ICD-10-CM

## 2019-06-13 DIAGNOSIS — C34.92 ADENOCARCINOMA OF LEFT LUNG (H): ICD-10-CM

## 2019-06-13 DIAGNOSIS — J44.9 CHRONIC OBSTRUCTIVE PULMONARY DISEASE, UNSPECIFIED COPD TYPE (H): ICD-10-CM

## 2019-06-13 DIAGNOSIS — G47.33 OSA (OBSTRUCTIVE SLEEP APNEA): ICD-10-CM

## 2019-06-13 PROCEDURE — 99306 1ST NF CARE HIGH MDM 50: CPT | Mod: AI | Performed by: INTERNAL MEDICINE

## 2019-06-13 NOTE — PROGRESS NOTES
"Carine Rodriguez is a 79 year old female seen 2019 at [facility] TCU where she was admitted this week after [facility] hospitalization -, then [place] hospitalization - for acute respiratory faiure with hypoxia, COPD exacerbation, left lung adenocarcinoma, atrial fibrillation and KINJAL.     Pt has a h/o left lung adenocarcinoma, s/p XRT in 2018, and on 19 received cycle 1 of palliative CTX with permetrexed, carboplatin and pembrolizumab.   She presented  with respiratory distress and was treated with BiPAP, nebs, steroid taper and abx.  Also found to have atrial fib with RVR.    However apixaban was held on  because of anemia and thrombocytopenia secondary to bone marrow suppression from her CTX.   Also had proximal DU with perforation.   She was treated with IV PPI and multiple transfusions.    She had a tooth extraction for broken tooth with decay and pain.  She eventually stabilized and transferred to TCU for Rehab.     Today patient is seen in her room, up to .   Reports she is feeling okay, continues to have RODRIGUEZ and poor appetite but no current pain.   States \"I have strength, but not endurance\" and has been working with therapies to improve this.      Past Medical History:   Diagnosis Date     Cancer (H)      COPD (chronic obstructive pulmonary disease) (H)      Hypertension    Atrial fibrillation / anticoagulation  KINJAL  CKD stage 3  Left lung adenocarcinoma, Stage IA3, 2018  Duodenal ulcer  Glaucoma    PSH:  Appendectomy  Partial nephrectomy for nephrolithiasis    Social History     Tobacco Use     Smoking status: Unknown If Ever Smoked     Smokeless tobacco: Never Used   Substance Use Topics     Alcohol use: Not on file     SH: Lives alone, house in [facility].    Has been staying with her daughter in [place] over past couple of months.   She has 3 daughters   Smoker until     FH: father  with colon cancer in his 60s, also had CHF and  at age " "85.   Mother had a h/o cancer and HTN       Review Of Systems  Skin: negative   Eyes: impaired vision, glasses  Ears/Nose/Throat: hearing loss  Respiratory: + dyspnea on exertion  Cardiovascular: irregular heart beat, lower extremity edema and exercise intolerance  Gastrointestinal: poor appetite, weight loss  Genitourinary: negative  Musculoskeletal: TUG 32 seconds, Tinetti 15/28  Ambulates with FWW, red tag, 150; with CGA, SBA for transfers  Neurologic: negative, SLUMS 27/30, safety questionnaire 24/27  Psychiatric: negative  Hematologic/Lymphatic/Immunologic: anemia  Endocrine: negative   Wt Readings from Last 5 Encounters:   06/12/19 83.9 kg (185 lb)   06/11/19 85.7 kg (189 lb)   05/10/19 94.1 kg (207 lb 7.3 oz)          GENERAL APPEARANCE: alert and no distress  /63   Pulse 107   Temp 96  F (35.6  C)   Resp 18   Ht 1.651 m (5' 5\")   Wt 83.9 kg (185 lb)   SpO2 92%   BMI 30.79 kg/m     HEENT: normocephalic, no lesion or abnormalities  NECK: no adenopathy, no asymmetry, masses, or scars and thyroid normal to palpation  RESP: decreased BS, scattered crackles  CV: irregular rate and rhythm, normal S1 S2 @90  ABDOMEN:  soft, nontender, no HSM or masses and bowel sounds normal  MS: extremities normal, 1+ ankle edema.  SKIN: no suspicious lesions or rashes  NEURO: mild hand tremor, generalized weakness, tracks well  PSYCH: affect okay  LYMPHATICS: No cervical,  or supraclavicular nodes     Last Comprehensive Metabolic Panel:  Sodium   Date Value Ref Range Status   06/12/2019 138 136 - 145 mmol/L Final     Potassium   Date Value Ref Range Status   06/12/2019 2.7 (A) 3.5 - 5.0 mmol/L Final     Chloride   Date Value Ref Range Status   06/12/2019 98 98 - 107 mmol/L Final     Carbon Dioxide   Date Value Ref Range Status   06/12/2019 29 22 - 31 mmol/L Final     Anion Gap   Date Value Ref Range Status   06/12/2019 11 5 - 18 mmol/L Final     Glucose   Date Value Ref Range Status   06/12/2019 97 70 - 125 mg/dL " Final     Urea Nitrogen   Date Value Ref Range Status   06/12/2019 21 8 - 28 mg/dL Final     Creatinine   Date Value Ref Range Status   06/12/2019 1.45 (A) 0.60 - 1.10 mg/dL Final     GFR Estimate   Date Value Ref Range Status   06/12/2019 35 (A) >60 ml/min/1.73m2 Final     Calcium   Date Value Ref Range Status   06/12/2019 8.9 8.5 - 10.5 mg/dL Final      HGB 7.5 06/12/2019        IMP/PLAN:   (J96.00) Acute respiratory failure, unspecified whether with hypoxia or hypercapnia (H)  (primary encounter diagnosis)  (J44.9) Chronic obstructive pulmonary disease, unspecified COPD type (H)  Comment: improved respiratory status, FEV1 0.5, +BD response  Plan: Combivent MDI, Spiriva, prn nebs    (C34.92) Adenocarcinoma of left lung (H)  Comment: s/p XRT and CTX  Plan: She has an appointment at [place] 6/18 for second dose of pembrolizumab    (D64.9) Anemia, unspecified type  Comment: blood loss and bone marrow suppression, hgb still low     Plan: follow hgb  Improve nutrition as possible, reviewed with patient    (K26.9) Duodenal ulceration  Comment: with perforation   Plan: pantoprazole and sucralfate    (I48.91) Atrial fibrillation with RVR (H)  Comment:   Pulse Readings from Last 4 Encounters:   06/12/19 107   06/11/19 97   05/13/19 98      Plan: VR control with diltiazem and metoprolol  Apixaban is on hold secondary to GI bleed and anemia.   Resumption once she is more stable.       (G47.33) KINJAL (obstructive sleep apnea)  Comment: on CPAP   Plan: with O2, home settings       (N18.3) Chronic renal insufficiency, stage III (moderate) (H)  Comment: GFR 35  K+ 2.7  Plan: renal dosing of medications, replace potassium orally, and recheck    (I10) Essential hypertension  Comment:   BP Readings from Last 3 Encounters:   06/12/19 120/63   06/11/19 106/62   05/13/19 94/64      Plan: metoprolol, diltiazem at current doses    (R53.81) Physical deconditioning  Comment: after prolonged illness   Plan: PHYSICAL THERAPY / OCCUPATIONAL  THERAPY for strengthening, endurance, ADLs.    Discharge goal is return to her daughter's house.        Swati Rodriguez MD

## 2019-06-13 NOTE — LETTER
"    6/13/2019        RE: Carine Rodriguez  [address]  [place] [address]        Carine Rodriguez is a 79 year old female seen June 13, [facility] where she was admitted this week after [facility] hospitalization 5/6-5/13, then [facility] 5/13-6/7 for acute respiratory faiure with hypoxia, COPD exacerbation, left lung adenocarcinoma, atrial fibrillation and KINJAL.     Pt has a h/o left lung adenocarcinoma, s/p XRT in 11/2018, and on 4/30/19 received cycle 1 of palliative CTX with permetrexed, carboplatin and pembrolizumab.   She presented 5/6 with respiratory distress and was treated with BiPAP, nebs, steroid taper and abx.  Also found to have atrial fib with RVR.    However apixaban was held on 5/16 because of anemia and thrombocytopenia secondary to bone marrow suppression from her CTX.   Also had proximal DU with perforation.   She was treated with IV PPI and multiple transfusions.    She had a tooth extraction for broken tooth with decay and pain.  She eventually stabilized and transferred to TCU for Rehab.     Today patient is seen in her room, up to .   Reports she is feeling okay, continues to have RODRIGUEZ and poor appetite but no current pain.   States \"I have strength, but not endurance\" and has been working with therapies to improve this.      Past Medical History:   Diagnosis Date     Cancer (H)      COPD (chronic obstructive pulmonary disease) (H)      Hypertension    Atrial fibrillation / anticoagulation  KINJAL  CKD stage 3  Left lung adenocarcinoma, Stage IA3, 2018  Duodenal ulcer  Glaucoma    PSH:  Appendectomy  Partial nephrectomy for nephrolithiasis    Social History     Tobacco Use     Smoking status: Unknown If Ever Smoked     Smokeless tobacco: Never Used   Substance Use Topics     Alcohol use: Not on file     SH: Lives alone, house in [facility].    Has been staying with her daughter in [place] over past couple of months.   She has 3 daughters   Smoker " "until     FH: father  with colon cancer in his 60s, also had CHF and  at age 85.   Mother had a h/o cancer and HTN       Review Of Systems  Skin: negative   Eyes: impaired vision, glasses  Ears/Nose/Throat: hearing loss  Respiratory: + dyspnea on exertion  Cardiovascular: irregular heart beat, lower extremity edema and exercise intolerance  Gastrointestinal: poor appetite, weight loss  Genitourinary: negative  Musculoskeletal: TUG 32 seconds, Tinetti 15/28  Ambulates with FWW, red tag, 150; with CGA, SBA for transfers  Neurologic: negative, SLUMS , safety questionnaire   Psychiatric: negative  Hematologic/Lymphatic/Immunologic: anemia  Endocrine: negative   Wt Readings from Last 5 Encounters:   19 83.9 kg (185 lb)   19 85.7 kg (189 lb)   05/10/19 94.1 kg (207 lb 7.3 oz)          GENERAL APPEARANCE: alert and no distress  /63   Pulse 107   Temp 96  F (35.6  C)   Resp 18   Ht 1.651 m (5' 5\")   Wt 83.9 kg (185 lb)   SpO2 92%   BMI 30.79 kg/m      HEENT: normocephalic, no lesion or abnormalities  NECK: no adenopathy, no asymmetry, masses, or scars and thyroid normal to palpation  RESP: decreased BS, scattered crackles  CV: irregular rate and rhythm, normal S1 S2 @90  ABDOMEN:  soft, nontender, no HSM or masses and bowel sounds normal  MS: extremities normal, 1+ ankle edema.  SKIN: no suspicious lesions or rashes  NEURO: mild hand tremor, generalized weakness, tracks well  PSYCH: affect okay  LYMPHATICS: No cervical,  or supraclavicular nodes     Last Comprehensive Metabolic Panel:  Sodium   Date Value Ref Range Status   2019 138 136 - 145 mmol/L Final     Potassium   Date Value Ref Range Status   2019 2.7 (A) 3.5 - 5.0 mmol/L Final     Chloride   Date Value Ref Range Status   2019 98 98 - 107 mmol/L Final     Carbon Dioxide   Date Value Ref Range Status   2019 29 22 - 31 mmol/L Final     Anion Gap   Date Value Ref Range Status   2019 11 5 - 18 " mmol/L Final     Glucose   Date Value Ref Range Status   06/12/2019 97 70 - 125 mg/dL Final     Urea Nitrogen   Date Value Ref Range Status   06/12/2019 21 8 - 28 mg/dL Final     Creatinine   Date Value Ref Range Status   06/12/2019 1.45 (A) 0.60 - 1.10 mg/dL Final     GFR Estimate   Date Value Ref Range Status   06/12/2019 35 (A) >60 ml/min/1.73m2 Final     Calcium   Date Value Ref Range Status   06/12/2019 8.9 8.5 - 10.5 mg/dL Final      HGB 7.5 06/12/2019        IMP/PLAN:   (J96.00) Acute respiratory failure, unspecified whether with hypoxia or hypercapnia (H)  (primary encounter diagnosis)  (J44.9) Chronic obstructive pulmonary disease, unspecified COPD type (H)  Comment: improved respiratory status, FEV1 0.5, +BD response  Plan: Combivent MDI, Spiriva, prn nebs    (C34.92) Adenocarcinoma of left lung (H)  Comment: s/p XRT and CTX  Plan: She has an appointment at [place] 6/18 for second dose of pembrolizumab    (D64.9) Anemia, unspecified type  Comment: blood loss and bone marrow suppression, hgb still low     Plan: follow hgb  Improve nutrition as possible, reviewed with patient    (K26.9) Duodenal ulceration  Comment: with perforation   Plan: pantoprazole and sucralfate    (I48.91) Atrial fibrillation with RVR (H)  Comment:   Pulse Readings from Last 4 Encounters:   06/12/19 107   06/11/19 97   05/13/19 98      Plan: VR control with diltiazem and metoprolol  Apixaban is on hold secondary to GI bleed and anemia.   Resumption once she is more stable.       (G47.33) KINJAL (obstructive sleep apnea)  Comment: on CPAP   Plan: with O2, home settings       (N18.3) Chronic renal insufficiency, stage III (moderate) (H)  Comment: GFR 35  K+ 2.7  Plan: renal dosing of medications, replace potassium orally, and recheck    (I10) Essential hypertension  Comment:   BP Readings from Last 3 Encounters:   06/12/19 120/63   06/11/19 106/62   05/13/19 94/64      Plan: metoprolol, diltiazem at current doses    (R53.81) Physical  deconditioning  Comment: after prolonged illness   Plan: PHYSICAL THERAPY / OCCUPATIONAL THERAPY for strengthening, endurance, ADLs.    Discharge goal is return to her daughter's house.        Swati Rodriguez MD       Sincerely,        Swati Rodriguez MD

## 2019-06-13 NOTE — PROGRESS NOTES
"CC: Lab Recheck    HPI:    Carine Rodriguez is a 79 year old  (1940), who is being seen today for an episodic care visit at  [facility] . Today's concern: lab recheck of HGB, BMP        OBJECTIVE: A & O x 3, NAD.sitting in chair No focal neurological deficits.      /64   Pulse 94   Temp 96.1  F (35.6  C)   Resp 16   Ht 1.651 m (5' 5\")   Wt 85.7 kg (189 lb)   SpO2 97%   BMI 31.45 kg/m      LABS: today:  Recent Labs   Lab Test 06/14/19 06/12/19    138   POTASSIUM 3.7 2.7*   CHLORIDE 101 98   CO2 26 29   ANIONGAP 10 11   GLC 99 97   BUN 22 21   CR 1.55* 1.45*   MELANIE 9.2 8.9     Hemoglobin   Date Value Ref Range Status   06/14/2019 7.4 (A) 12.0 - 16.0 g/dL Final   06/12/2019 7.5 (A) 12.0 - 16.0 g/dL Final     ASSESSMENT / PLAN:  (C34.92) Adenocarcinoma of left lung (H)  (primary encounter diagnosis)   (D64.9) Anemia, unspecified type   (K26.9) Duodenal ulceration  Comment/Plan: steady Hgb, will check 6/18, monitor.    (N18.3) Chronic renal insufficiency, stage III (moderate) (H)   (E87.6) Hypokalemia  Comment/Plan: K better at 3.7, weill give additional 40 meq today though, lab check at 6/18 at [place] appt    *I asked staff to fax labs to [place] also today*    Electronically Signed by:    Paula Olivia RN, CNP      "

## 2019-06-14 ENCOUNTER — NURSING HOME VISIT (OUTPATIENT)
Dept: GERIATRICS | Facility: CLINIC | Age: 79
End: 2019-06-14
Payer: MEDICARE

## 2019-06-14 ENCOUNTER — TRANSFERRED RECORDS (OUTPATIENT)
Dept: HEALTH INFORMATION MANAGEMENT | Facility: CLINIC | Age: 79
End: 2019-06-14

## 2019-06-14 VITALS
HEIGHT: 65 IN | WEIGHT: 189 LBS | OXYGEN SATURATION: 97 % | SYSTOLIC BLOOD PRESSURE: 109 MMHG | TEMPERATURE: 96.1 F | DIASTOLIC BLOOD PRESSURE: 64 MMHG | RESPIRATION RATE: 16 BRPM | HEART RATE: 94 BPM | BODY MASS INDEX: 31.49 KG/M2

## 2019-06-14 DIAGNOSIS — C34.92 ADENOCARCINOMA OF LEFT LUNG (H): Primary | ICD-10-CM

## 2019-06-14 DIAGNOSIS — K26.9 DUODENAL ULCERATION: ICD-10-CM

## 2019-06-14 DIAGNOSIS — E87.6 HYPOKALEMIA: ICD-10-CM

## 2019-06-14 DIAGNOSIS — N18.30 CHRONIC RENAL INSUFFICIENCY, STAGE III (MODERATE) (H): ICD-10-CM

## 2019-06-14 DIAGNOSIS — D64.9 ANEMIA, UNSPECIFIED TYPE: ICD-10-CM

## 2019-06-14 PROBLEM — J44.9 CHRONIC OBSTRUCTIVE PULMONARY DISEASE, UNSPECIFIED COPD TYPE (H): Status: ACTIVE | Noted: 2019-05-06

## 2019-06-14 LAB
ANION GAP SERPL CALCULATED.3IONS-SCNC: 10 MMOL/L (ref 5–18)
ANION GAP SERPL CALCULATED.3IONS-SCNC: 10 MMOL/L (ref 5–18)
BUN SERPL-MCNC: 22 MG/DL (ref 8–28)
BUN SERPL-MCNC: 22 MG/DL (ref 8–28)
CALCIUM SERPL-MCNC: 9.2 MG/DL (ref 8.5–10.5)
CALCIUM SERPL-MCNC: 9.2 MG/DL (ref 8.5–10.5)
CHLORIDE BLD-SCNC: 101 MMOL/L (ref 98–107)
CHLORIDE SERPLBLD-SCNC: 101 MMOL/L (ref 98–107)
CO2 SERPL-SCNC: 26 MMOL/L (ref 22–31)
CO2 SERPL-SCNC: 26 MMOL/L (ref 22–31)
CREAT SERPL-MCNC: 1.55 MG/DL (ref 0.6–1.1)
CREAT SERPL-MCNC: 1.55 MG/DL (ref 0.6–1.1)
GFR SERPL CREATININE-BSD FRML MDRD: 32 ML/MIN/1.73M2
GFR SERPL CREATININE-BSD FRML MDRD: 32 ML/MIN/1.73M2
GLUCOSE BLD-MCNC: 99 MG/DL (ref 70–125)
GLUCOSE SERPL-MCNC: 99 MG/DL (ref 70–125)
HEMOGLOBIN: 7.4 G/DL (ref 12–16)
HGB BLD-MCNC: 7.4 G/DL (ref 12–16)
POTASSIUM BLD-SCNC: 3.7 MMOL/L (ref 3.5–5)
POTASSIUM SERPL-SCNC: 3.7 MMOL/L (ref 3.5–5)
SODIUM SERPL-SCNC: 137 MMOL/L (ref 136–145)
SODIUM SERPL-SCNC: 137 MMOL/L (ref 136–145)

## 2019-06-14 PROCEDURE — 99308 SBSQ NF CARE LOW MDM 20: CPT | Performed by: NURSE PRACTITIONER

## 2019-06-14 ASSESSMENT — MIFFLIN-ST. JEOR: SCORE: 1338.18

## 2019-06-17 ENCOUNTER — NURSING HOME VISIT (OUTPATIENT)
Dept: GERIATRICS | Facility: CLINIC | Age: 79
End: 2019-06-17
Payer: MEDICARE

## 2019-06-17 VITALS
SYSTOLIC BLOOD PRESSURE: 130 MMHG | WEIGHT: 188 LBS | OXYGEN SATURATION: 94 % | RESPIRATION RATE: 18 BRPM | DIASTOLIC BLOOD PRESSURE: 67 MMHG | HEART RATE: 99 BPM | TEMPERATURE: 97.2 F | BODY MASS INDEX: 31.32 KG/M2 | HEIGHT: 65 IN

## 2019-06-17 DIAGNOSIS — E11.8 TYPE 2 DIABETES MELLITUS WITH COMPLICATION, WITHOUT LONG-TERM CURRENT USE OF INSULIN (H): ICD-10-CM

## 2019-06-17 DIAGNOSIS — I48.91 ATRIAL FIBRILLATION WITH RVR (H): ICD-10-CM

## 2019-06-17 DIAGNOSIS — J44.9 CHRONIC OBSTRUCTIVE PULMONARY DISEASE, UNSPECIFIED COPD TYPE (H): ICD-10-CM

## 2019-06-17 DIAGNOSIS — E87.6 HYPOKALEMIA: ICD-10-CM

## 2019-06-17 DIAGNOSIS — K26.9 DUODENAL ULCERATION: ICD-10-CM

## 2019-06-17 DIAGNOSIS — R53.81 PHYSICAL DECONDITIONING: ICD-10-CM

## 2019-06-17 DIAGNOSIS — C34.92 ADENOCARCINOMA OF LEFT LUNG (H): Primary | ICD-10-CM

## 2019-06-17 DIAGNOSIS — D64.9 ANEMIA, UNSPECIFIED TYPE: ICD-10-CM

## 2019-06-17 PROCEDURE — 99309 SBSQ NF CARE MODERATE MDM 30: CPT | Performed by: NURSE PRACTITIONER

## 2019-06-17 ASSESSMENT — MIFFLIN-ST. JEOR: SCORE: 1333.64

## 2019-06-17 NOTE — PROGRESS NOTES
[place] [facility]  [place] [facility] Record Number:  [id number]  Place of Service where encounter took place:  [facility] ([facility]) [[id number]]  Chief Complaint   Patient presents with     Nursing Home Acute       HPI:    Carine Rodriguez  is a 79 year old (1940), who is being seen today for an episodic care visit.  HPI information obtained from: facility chart records, facility staff, patient report and [facility] chart review. Today's concern is:     Adenocarcinoma of left lung (H)  Anemia, unspecified type  Duodenal ulceration  Hypokalemia  Chronic obstructive pulmonary disease, unspecified COPD type (H)  Type 2 diabetes mellitus with complication, without long-term current use of insulin (H)  Physical deconditioning  Atrial fibrillation with RVR (H)      Past Medical and Surgical History reviewed in Epic today.    MEDICATIONS:  Current Outpatient Medications   Medication Sig Dispense Refill     acetaminophen (TYLENOL) 325 MG tablet Take 650 mg by mouth every 6 hours as needed for mild pain       albuterol (PROAIR HFA/PROVENTIL HFA/VENTOLIN HFA) 108 (90 Base) MCG/ACT inhaler Inhale 2 puffs into the lungs every 4 hours as needed for shortness of breath / dyspnea or wheezing       apixaban ANTICOAGULANT (ELIQUIS) 5 MG tablet Take 5 mg by mouth 2 times daily **ON HOLD SINCE ~5/16/19 PER [place] NOTES*       atorvastatin (LIPITOR) 80 MG tablet Take 80 mg by mouth every evening       cetirizine (ZYRTEC) 10 MG tablet Take 10 mg by mouth daily       diltiazem ER COATED BEADS (CARDIZEM CD/CARTIA XT) 300 MG 24 hr capsule Take 300 mg by mouth daily       fluticasone-salmeterol (ADVAIR) 250-50 MCG/DOSE inhaler Inhale 1 puff into the lungs 2 times daily       folic acid (FOLVITE) 400 MCG tablet Take 400 mcg by mouth daily       furosemide (LASIX) 40 MG tablet Take 40 mg by mouth every morning       furosemide (LASIX) 40 MG tablet Take 20 mg by mouth daily (with lunch) 1/2 of a 40mg  "tab--take 20mg At 1200       hypromellose-dextran (ARTIFICAL TEARS) 0.1-0.3 % ophthalmic solution Apply 2 drops to eye every hour as needed for dry eyes       Ipratropium-Albuterol (COMBIVENT RESPIMAT)  MCG/ACT inhaler Inhale 1 puff into the lungs 4 times daily as needed for shortness of breath / dyspnea or wheezing       latanoprost (XALATAN) 0.005 % ophthalmic solution Place 1 drop into both eyes daily       metoprolol tartrate (LOPRESSOR) 100 MG tablet Take 50 mg by mouth 2 times daily        pantoprazole sodium (PROTONIX) 40 MG packet Take 1 packet by mouth 2 times daily       polyethylene glycol (MIRALAX/GLYCOLAX) packet Take 17 g by mouth 2 times daily as needed for constipation (Patient taking differently: Take 17 g by mouth daily as needed for constipation )       senna-docusate (SENOKOT-S/PERICOLACE) 8.6-50 MG tablet Take 1 tablet by mouth 2 times daily as needed for constipation       sucralfate (CARAFATE) 1 GM tablet Take 1 g by mouth 4 times daily       tiotropium (SPIRIVA RESPIMAT) 2.5 MCG/ACT inhaler Inhale 2 puffs into the lungs daily       traMADol (ULTRAM) 50 MG tablet Take 50 mg by mouth every 4 hours as needed for severe pain          TODAY DURING EXAM/ROS:  No CP, SOB, Cough, dizziness, fevers, chills, HA, N/V, dysuria or Bowel Abnormalities. Appetite is reasonable.  No pains    Objective:  /67   Pulse 99   Temp 97.2  F (36.2  C)   Resp 18   Ht 1.651 m (5' 5\")   Wt 85.3 kg (188 lb)   SpO2 94%   BMI 31.28 kg/m    Exam:  GENERAL APPEARANCE:  Alert, in no distress, oriented, cooperative  ENT:  Mouth with moist mucous membranes, normal hearing acuity  EYES:  EOM, conjunctivae, lids, pupils and irises normal  RESP:  lungs clear to auscultation but decreased mildly thru-out.  CV:  Auscultation of heart done , IRRR, no murmur, rub, or gallop,+1 bilat pedal to mid calves' edema, +2 pedal pulses  ABDOMEN:  normal bowel sounds, soft, nontender.  M/S:   STEPHENS Equally, up with assist  SKIN:  " Inspection of skin and subcutaneous tissue baseline  NEURO:   Cranial nerves 2-12 are  grossly intact and at patient's baseline  PSYCH:  oriented X 3, normal insight, judgement and memory, ?memory impaired       Labs:   Recent Labs   Lab Test 06/14/19 06/12/19    138   POTASSIUM 3.7 2.7*   CHLORIDE 101 98   CO2 26 29   ANIONGAP 10 11   GLC 99 97   BUN 22 21   CR 1.55* 1.45*   MELANIE 9.2 8.9     CBC RESULTS:   Recent Labs   Lab Test 05/13/19  0807   WBC 1.5*   RBC 3.31*   HGB 9.6*   HCT 30.5*   MCV 92   MCH 29.0   MCHC 31.5   RDW 15.3*   PLT 49*    < > = values in this interval not displayed.      Hemoglobin   Date Value Ref Range Status   06/14/2019 7.4 (A) 12.0 - 16.0 g/dL Final   06/12/2019 7.5 (A) 12.0 - 16.0 g/dL Final       ASSESSMENT / PLAN:     (C34.92) Adenocarcinoma of left lung (H)  Comment/Plan: no O2, uses CPAP, has appt 6/18 with [facility] with Oncology and labs  also.     (J44.9) Chronic obstructive pulmonary disease, unspecified COPD type (H)  Comment/Plan: cont meds, POC , CPAP, monitor.    (D64.9) Anemia, unspecified type   (K26.9) Duodenal ulceration  Comment/Plan:  Hgb steady, will have labs in am     (I48.91) Atrial fibrillation with RVR (H)  Comment/Plan: cont same meds, hold parameters for CCB and BB, monitor.  Apixaban was stopped about may 16th--cannot find exact dates in [facility] notes.  Will not be restarted until ok with oncology--~~ 1 month per [facility] notes.    (E11.8) Type 2 diabetes mellitus with complication, without long-term current use of insulin (H)  Comment/Plan: sugars in low to mid 100's cont monitor    (E87.6) Hypokalemia  Comment/Plan: replaced, will be checked in the am.    (R53.81) Physical deconditioning  Comment/Plan: PT OT     [facility] BLUE team ONCOLOGY: PHONE  [phone]  AND FAX [phone]     Electronically signed by:  BLAIR Davis CNP

## 2019-06-20 ENCOUNTER — NURSING HOME VISIT (OUTPATIENT)
Dept: GERIATRICS | Facility: CLINIC | Age: 79
End: 2019-06-20
Payer: MEDICARE

## 2019-06-20 VITALS
WEIGHT: 192 LBS | HEART RATE: 97 BPM | SYSTOLIC BLOOD PRESSURE: 103 MMHG | BODY MASS INDEX: 31.99 KG/M2 | HEIGHT: 65 IN | RESPIRATION RATE: 18 BRPM | DIASTOLIC BLOOD PRESSURE: 60 MMHG | OXYGEN SATURATION: 95 % | TEMPERATURE: 96.6 F

## 2019-06-20 DIAGNOSIS — J44.9 CHRONIC OBSTRUCTIVE PULMONARY DISEASE, UNSPECIFIED COPD TYPE (H): ICD-10-CM

## 2019-06-20 DIAGNOSIS — R53.81 PHYSICAL DECONDITIONING: ICD-10-CM

## 2019-06-20 DIAGNOSIS — D64.9 ANEMIA, UNSPECIFIED TYPE: ICD-10-CM

## 2019-06-20 DIAGNOSIS — C34.92 ADENOCARCINOMA OF LEFT LUNG (H): Primary | ICD-10-CM

## 2019-06-20 DIAGNOSIS — I48.91 ATRIAL FIBRILLATION WITH RVR (H): ICD-10-CM

## 2019-06-20 DIAGNOSIS — K26.9 DUODENAL ULCERATION: ICD-10-CM

## 2019-06-20 DIAGNOSIS — R60.0 BILATERAL LEG EDEMA: ICD-10-CM

## 2019-06-20 PROCEDURE — 99309 SBSQ NF CARE MODERATE MDM 30: CPT | Performed by: NURSE PRACTITIONER

## 2019-06-20 ASSESSMENT — MIFFLIN-ST. JEOR: SCORE: 1351.79

## 2019-06-20 NOTE — PROGRESS NOTES
[place] [facility]  [place] Medical Record Number:  [id number]  Place of Service where encounter took place:  [facility] ([facility] [[id number]]  Chief Complaint   Patient presents with     Nursing Home Acute       HPI:    Carine Rodriguez  is a 79 year old (1940), who is being seen today for an episodic care visit.  HPI information obtained from: facility chart records, facility staff, patient report and [facility] chart review. Today's concern is: pt had appt 6/18, says it went well and feeling stronger. Has recliner so elevating legs and says edema better/legs softer     Adenocarcinoma of left lung (H)  Duodenal ulceration  Anemia, unspecified type  Chronic obstructive pulmonary disease, unspecified COPD type (H)  Atrial fibrillation with RVR (H)  Bilateral leg edema  Physical deconditioning      Past Medical and Surgical History reviewed in Epic today.    MEDICATIONS:  Current Outpatient Medications   Medication Sig Dispense Refill     acetaminophen (TYLENOL) 325 MG tablet Take 650 mg by mouth every 6 hours as needed for mild pain       albuterol (PROAIR HFA/PROVENTIL HFA/VENTOLIN HFA) 108 (90 Base) MCG/ACT inhaler Inhale 2 puffs into the lungs every 4 hours as needed for shortness of breath / dyspnea or wheezing       apixaban ANTICOAGULANT (ELIQUIS) 5 MG tablet Take 5 mg by mouth 2 times daily **ON HOLD SINCE ~5/16/19 PER [place] NOTES*       atorvastatin (LIPITOR) 80 MG tablet Take 80 mg by mouth every evening       cetirizine (ZYRTEC) 10 MG tablet Take 10 mg by mouth daily       diltiazem ER COATED BEADS (CARDIZEM CD/CARTIA XT) 300 MG 24 hr capsule Take 300 mg by mouth daily       fluticasone-salmeterol (ADVAIR) 250-50 MCG/DOSE inhaler Inhale 1 puff into the lungs 2 times daily       folic acid (FOLVITE) 400 MCG tablet Take 400 mcg by mouth daily       furosemide (LASIX) 40 MG tablet Take 40 mg by mouth every morning       furosemide (LASIX) 40 MG tablet Take 20 mg by  "mouth daily (with lunch) 1/2 of a 40mg tab--take 20mg At 1200       hypromellose-dextran (ARTIFICAL TEARS) 0.1-0.3 % ophthalmic solution Apply 2 drops to eye every hour as needed for dry eyes       Ipratropium-Albuterol (COMBIVENT RESPIMAT)  MCG/ACT inhaler Inhale 1 puff into the lungs 4 times daily as needed for shortness of breath / dyspnea or wheezing       latanoprost (XALATAN) 0.005 % ophthalmic solution Place 1 drop into both eyes daily       metoprolol tartrate (LOPRESSOR) 100 MG tablet Take 50 mg by mouth 2 times daily        pantoprazole sodium (PROTONIX) 40 MG packet Take 1 packet by mouth 2 times daily       polyethylene glycol (MIRALAX/GLYCOLAX) packet Take 17 g by mouth 2 times daily as needed for constipation (Patient taking differently: Take 17 g by mouth daily as needed for constipation )       senna-docusate (SENOKOT-S/PERICOLACE) 8.6-50 MG tablet Take 1 tablet by mouth 2 times daily as needed for constipation       sucralfate (CARAFATE) 1 GM tablet Take 1 g by mouth 4 times daily       tiotropium (SPIRIVA RESPIMAT) 2.5 MCG/ACT inhaler Inhale 2 puffs into the lungs daily       traMADol (ULTRAM) 50 MG tablet Take 50 mg by mouth every 4 hours as needed for severe pain          TODAY DURING EXAM/ROS:  No CP, SOB, Cough, dizziness, fevers, chills, HA, N/V, dysuria or Bowel Abnormalities. Appetite is reasonable.  No pains    Objective:  /60   Pulse 97   Temp 96.6  F (35.9  C)   Resp 18   Ht 1.651 m (5' 5\")   Wt 87.1 kg (192 lb)   SpO2 95%   BMI 31.95 kg/m    Exam:  GENERAL APPEARANCE:  Alert, in no distress, oriented, cooperative  ENT:  Mouth with moist mucous membranes, normal hearing acuity  EYES:  EOM, conjunctivae, lids, pupils and irises normal  RESP:  lungs clear to auscultation but decreased mildly thru-out.  CV:  Auscultation of heart done , IRRR, no murmur, rub, or gallop,+1 bilat pedal to mid calves' edema, +2 pedal pulses  ABDOMEN:  normal bowel sounds, soft, " nontender.  M/S:   STEPHENS Equally, up with assist  SKIN:  Inspection of skin and subcutaneous tissue baseline  NEURO:   Cranial nerves 2-12 are  grossly intact and at patient's baseline  PSYCH:  oriented X 3, normal insight, judgement and memory, ?memory impaired       Labs:      Recent Labs   Lab Test 6/18/19([place]) 06/14/19 06/12/19    137 138   POTASSIUM 4 3.7 2.7*   CHLORIDE 101 101 98   CO2 24 26 29   ANIONGAP  10 11   GLC  99 97   BUN 18 22 21   CR 1.39/gfr36 1.55* 1.45*   MELANIE  9.2 8.9    6/18/19 at [facility]:  Albumin 3/1. Protein 5.6, LFTS WNL       Recent Labs   Lab Test 6/18/19  ([place]) 6/14/19 05/13/19  0807   WBC   1.5*   RBC   3.31*   HGB 7.8 7.4 9.6*   HCT   30.5*   MCV   92   MCH   29.0   MCHC   31.5   RDW   15.3*     49*       < > = values in this interval not displayed.      Hemoglobin   Date Value Ref Range Status   06/14/2019 7.4 (A) 12.0 - 16.0 g/dL Final         ASSESSMENT / PLAN:     (C34.92) Adenocarcinoma of left lung (H)  Comment/Plan: no O2, uses CPAP, had appt 6/18 with [facility] with Oncology and labs  Also--appt went well, had infusion of immunotherapy w/o issues.. RTC in 3 weeks for more immunotherapy.    (D64.9) Anemia, unspecified type   (K26.9) Duodenal ulceration  Comment/Plan:  Hgb steady, will have Hgb 6/24.  Plts WNL     (J44.9) Chronic obstructive pulmonary disease, unspecified COPD type (H)  Comment/Plan: compensated, doing well, cont meds, POC , CPAP, monitor.     (I48.91) Atrial fibrillation with RVR (H)  Comment/Plan: rate controlled in 70-90 range . SBP in the 105-120's SBP mostly, will  cont same meds,   Apixaban was stopped about may 16 th--no restart date yet.  oncology to decide when ok with oncology to restart-~~ 1 month per [place] notes.     (R60.0) Bilateral leg edema  Comment/Plan: improved, wts  188.8# on admit, up to 192.4# and now with more elevation and activity, down to 189.8# today.  No changes to POC, monitor    (R53.81) Physical deconditioning  Comment/Plan: PT  OT     [facility] team ONCOLOGY: PHONE  [phone]  AND FAX [phone]     Electronically signed by:  BLAIR Davis CNP

## 2019-06-21 ENCOUNTER — RECORDS - HEALTHEAST (OUTPATIENT)
Dept: LAB | Facility: CLINIC | Age: 79
End: 2019-06-21

## 2019-06-22 ENCOUNTER — TELEPHONE (OUTPATIENT)
Dept: GERIATRICS | Facility: CLINIC | Age: 79
End: 2019-06-22

## 2019-06-22 DIAGNOSIS — R11.0 NAUSEA: Primary | ICD-10-CM

## 2019-06-22 RX ORDER — ONDANSETRON 8 MG/1
8 TABLET, FILM COATED ORAL EVERY 8 HOURS PRN
Start: 2019-06-22

## 2019-06-23 NOTE — TELEPHONE ENCOUNTER
nsg called and pt c/o nausea    Appears was on zofran in the hosp 8 mg p[o q 8    Will restart.   1. Nausea    - ondansetron (ZOFRAN) 8 MG tablet; Take 1 tablet (8 mg) by mouth every 8 hours as needed for nausea

## 2019-06-24 ENCOUNTER — NURSING HOME VISIT (OUTPATIENT)
Dept: GERIATRICS | Facility: CLINIC | Age: 79
End: 2019-06-24
Payer: MEDICARE

## 2019-06-24 ENCOUNTER — TRANSFERRED RECORDS (OUTPATIENT)
Dept: HEALTH INFORMATION MANAGEMENT | Facility: CLINIC | Age: 79
End: 2019-06-24

## 2019-06-24 VITALS
WEIGHT: 186.2 LBS | OXYGEN SATURATION: 94 % | DIASTOLIC BLOOD PRESSURE: 50 MMHG | BODY MASS INDEX: 31.02 KG/M2 | HEIGHT: 65 IN | SYSTOLIC BLOOD PRESSURE: 101 MMHG | HEART RATE: 98 BPM

## 2019-06-24 DIAGNOSIS — R60.0 BILATERAL LEG EDEMA: ICD-10-CM

## 2019-06-24 DIAGNOSIS — I48.91 ATRIAL FIBRILLATION WITH RVR (H): ICD-10-CM

## 2019-06-24 DIAGNOSIS — D64.9 ANEMIA, UNSPECIFIED TYPE: ICD-10-CM

## 2019-06-24 DIAGNOSIS — K26.9 DUODENAL ULCERATION: ICD-10-CM

## 2019-06-24 DIAGNOSIS — C34.92 ADENOCARCINOMA OF LEFT LUNG (H): Primary | ICD-10-CM

## 2019-06-24 DIAGNOSIS — J44.9 CHRONIC OBSTRUCTIVE PULMONARY DISEASE, UNSPECIFIED COPD TYPE (H): ICD-10-CM

## 2019-06-24 DIAGNOSIS — R11.0 NAUSEA: ICD-10-CM

## 2019-06-24 DIAGNOSIS — R53.81 PHYSICAL DECONDITIONING: ICD-10-CM

## 2019-06-24 LAB
HEMOGLOBIN: 7.5 G/DL (ref 12–16)
HGB BLD-MCNC: 7.5 G/DL (ref 12–16)

## 2019-06-24 PROCEDURE — 99309 SBSQ NF CARE MODERATE MDM 30: CPT | Performed by: NURSE PRACTITIONER

## 2019-06-24 RX ORDER — POLYETHYLENE GLYCOL 1450
17 POWDER (GRAM) MISCELLANEOUS DAILY PRN
COMMUNITY

## 2019-06-24 RX ORDER — DILTIAZEM HYDROCHLORIDE 240 MG/1
240 CAPSULE, COATED, EXTENDED RELEASE ORAL DAILY
COMMUNITY
End: 2019-07-12

## 2019-06-24 ASSESSMENT — MIFFLIN-ST. JEOR: SCORE: 1325.48

## 2019-06-24 NOTE — PROGRESS NOTES
[place] [facility]  [place] Medical Record Number:  [id number]  Place of Service where encounter took place:  [facility] ([facility]) [[id number]]  Chief Complaint   Patient presents with     Nursing Home Acute       HPI:    Carine Rodriguez  is a 79 year old (1940), who is being seen today for an episodic care visit.  HPI information obtained from: facility chart records, facility staff, patient report and [facility] chart review. Today's concern is: pt had states good wkd and feeling stronger except had nausea on wkd so on call called and Zofran ordered and given three times on wkd and helpful. Pt eating better with it.     Adenocarcinoma of left lung (H)  Nausea  Duodenal ulceration  Anemia, unspecified type  Chronic obstructive pulmonary disease, unspecified COPD type (H)  Atrial fibrillation with RVR (H)  Bilateral leg edema  Physical deconditioning      Past Medical and Surgical History reviewed in Epic today.    MEDICATIONS:  Current Outpatient Medications   Medication Sig Dispense Refill     acetaminophen (TYLENOL) 325 MG tablet Take 650 mg by mouth every 6 hours as needed for mild pain       albuterol (PROAIR HFA/PROVENTIL HFA/VENTOLIN HFA) 108 (90 Base) MCG/ACT inhaler Inhale 2 puffs into the lungs every 4 hours as needed for shortness of breath / dyspnea or wheezing       atorvastatin (LIPITOR) 80 MG tablet Take 80 mg by mouth every evening       cetirizine (ZYRTEC) 10 MG tablet Take 10 mg by mouth daily       diltiazem ER COATED BEADS (CARDIZEM CD/CARTIA XT) 240 MG 24 hr capsule Take 240 mg by mouth daily       fluticasone-salmeterol (ADVAIR) 250-50 MCG/DOSE inhaler Inhale 1 puff into the lungs 2 times daily       folic acid (FOLVITE) 400 MCG tablet Take 400 mcg by mouth daily       furosemide (LASIX) 40 MG tablet Take 40 mg by mouth every morning       furosemide (LASIX) 40 MG tablet Take 20 mg by mouth daily (with lunch) 1/2 of a 40mg tab--take 20mg At 1200    "    Ipratropium-Albuterol (COMBIVENT RESPIMAT)  MCG/ACT inhaler Inhale 1 puff into the lungs 4 times daily as needed for shortness of breath / dyspnea or wheezing       latanoprost (XALATAN) 0.005 % ophthalmic solution Place 1 drop into both eyes daily       metoprolol tartrate (LOPRESSOR) 100 MG tablet Take 50 mg by mouth 2 times daily        ondansetron (ZOFRAN) 8 MG tablet Take 1 tablet (8 mg) by mouth every 8 hours as needed for nausea       OTHER MEDICAL SUPPLIES every shift Document O2 Sat and L/Min AND every evening shift every Mon for Oxygen Maintenance Change: Tubing, Mask &/or humidifier and date clean concentrator filter as applicable every Monday.       pantoprazole sodium (PROTONIX) 40 MG packet Take 1 packet by mouth 2 times daily       Polyethylene Glycol 1450 POWD 17 g daily as needed Give 17 gram by mouth as needed for constipation QD prn, for constipation up to 3 days, dissolve each 17 gm dose in 240mls (8oz) of beverage       senna-docusate (SENOKOT-S/PERICOLACE) 8.6-50 MG tablet Take 1 tablet by mouth 2 times daily as needed for constipation       sucralfate (CARAFATE) 1 GM tablet Take 1 g by mouth 4 times daily       tiotropium (SPIRIVA RESPIMAT) 2.5 MCG/ACT inhaler Inhale 2 puffs into the lungs daily       traMADol (ULTRAM) 50 MG tablet Take 50 mg by mouth every 4 hours as needed for severe pain       apixaban ANTICOAGULANT (ELIQUIS) 5 MG tablet Take 5 mg by mouth 2 times daily **ON HOLD SINCE ~5/16/19 PER [place] NOTES* Apixaban on hold for at least one month. (Due to GI ulcer bleed) on HOLD since 5/16/19 per notes and will NOT restart until OK'd by [facility] oncologist per NP          TODAY DURING EXAM/ROS:  No CP, SOB, Cough, dizziness, fevers, chills, HA, N/V, dysuria or Bowel Abnormalities. Appetite is reasonable.  No pains    Objective:  /50   Pulse 98   Ht 1.651 m (5' 5\")   Wt 84.5 kg (186 lb 3.2 oz)   SpO2 94%   BMI 30.99 kg/m    Exam:  GENERAL APPEARANCE:  Alert, in no " distress, oriented, cooperative  ENT:  Mouth with moist mucous membranes, normal hearing acuity  EYES:  EOM, conjunctivae, lids, pupils and irises normal  RESP:  lungs clear to auscultation except a few exp wheezes and cough after had neb today.  Overall mildly decreased thru-out.  CV:  Auscultation of heart done , IRRR, no murmur, rub, or gallop,+1 bilat pedal to mid calves' edema-legs softer to touch, +2 pedal pulses  ABDOMEN:  normal bowel sounds, soft, nontender.  M/S:   STEPHENS Equally, up with assist  SKIN:  Inspection of skin and subcutaneous tissue baseline  NEURO:   Cranial nerves 2-12 are  grossly intact and at patient's baseline  PSYCH:  oriented X 3, normal insight, judgement and memory,        Labs:      Recent Labs   Lab Test 6/18/19([place]) 06/14/19 06/12/19    137 138   POTASSIUM 4 3.7 2.7*   CHLORIDE 101 101 98   CO2 24 26 29   ANIONGAP  10 11   GLC  99 97   BUN 18 22 21   CR 1.39/gfr36 1.55* 1.45*   MELANIE  9.2 8.9    6/18/19 at [facility]:  Albumin 3/1. Protein 5.6, LFTS WNL        Recent Labs   Lab Test 6/24/19 6/18/19  ([place]) 6/14/19 05/13/19  0807   WBC    1.5*   RBC    3.31*   HGB 7.5 7.8 7.4 9.6*   HCT    30.5*   MCV    92   MCH    29.0   MCHC    31.5   RDW    15.3*   PLT  211  49*        < > = values in this interval not displayed.        ASSESSMENT / PLAN:     (C34.92) Adenocarcinoma of left lung (H)  Comment/Plan: no O2, uses CPAP, had appt 6/18 with [facility] with Oncology and had infusion of immunotherapy w/o issues.. RTC in 3 weeks for more immunotherapy.    (R11.0) Nausea  Comment/Plan: Zofran prn--likely relate to the CA and/or immunotherapy, monitor    (D64.9) Anemia, unspecified type   (K26.9) Duodenal ulceration  Comment/Plan:  Hgb 7.5 today and steady, check later this week.     (J44.9) Chronic obstructive pulmonary disease, unspecified COPD type (H)  Comment/Plan: overall compensated, doing well, cont meds, POC ,nebs,  CPAP, monitor.     (I48.91) Atrial fibrillation with RVR (H)  Comment/Plan:  rate controlled in 70-90 range . SBP in the 105-120's SBP mostly, will  cont same meds,   Apixaban was stopped ~May 16 th--no restart date yet.  Oncology to decide when ok to restart-~~ 1 month per [facility] notes.     (R60.0) Bilateral leg edema  Comment/Plan: improved, wts  186.2# today, was 188.8# on admit, up to 192.4# and has been trending downward. No changes to POC, monitor    (R53.81) Physical deconditioning  Comment/Plan: PT OT--Rounds in am and no LCD yet     [facility] BLUE team ONCOLOGY: PHONE  [phone]  AND FAX [phone]     Electronically signed by:  BLAIR Davis CNP

## 2019-06-26 ASSESSMENT — MIFFLIN-ST. JEOR: SCORE: 1322.76

## 2019-06-27 ENCOUNTER — NURSING HOME VISIT (OUTPATIENT)
Dept: GERIATRICS | Facility: CLINIC | Age: 79
End: 2019-06-27
Payer: MEDICARE

## 2019-06-27 VITALS
OXYGEN SATURATION: 91 % | TEMPERATURE: 95.5 F | BODY MASS INDEX: 30.92 KG/M2 | WEIGHT: 185.6 LBS | HEART RATE: 65 BPM | HEIGHT: 65 IN | DIASTOLIC BLOOD PRESSURE: 61 MMHG | SYSTOLIC BLOOD PRESSURE: 106 MMHG | RESPIRATION RATE: 16 BRPM

## 2019-06-27 DIAGNOSIS — D64.9 ANEMIA, UNSPECIFIED TYPE: ICD-10-CM

## 2019-06-27 DIAGNOSIS — K26.9 DUODENAL ULCERATION: ICD-10-CM

## 2019-06-27 DIAGNOSIS — L29.9 PRURITUS OF SKIN: Primary | ICD-10-CM

## 2019-06-27 DIAGNOSIS — C34.92 ADENOCARCINOMA OF LEFT LUNG (H): ICD-10-CM

## 2019-06-27 DIAGNOSIS — R53.81 PHYSICAL DECONDITIONING: ICD-10-CM

## 2019-06-27 DIAGNOSIS — I48.91 ATRIAL FIBRILLATION WITH RVR (H): ICD-10-CM

## 2019-06-27 DIAGNOSIS — J44.9 CHRONIC OBSTRUCTIVE PULMONARY DISEASE, UNSPECIFIED COPD TYPE (H): ICD-10-CM

## 2019-06-27 DIAGNOSIS — R60.0 BILATERAL LEG EDEMA: ICD-10-CM

## 2019-06-27 PROCEDURE — 99309 SBSQ NF CARE MODERATE MDM 30: CPT | Performed by: NURSE PRACTITIONER

## 2019-06-27 RX ORDER — POTASSIUM CHLORIDE 1500 MG/1
20 TABLET, EXTENDED RELEASE ORAL 2 TIMES DAILY
COMMUNITY
Start: 2019-07-11

## 2019-06-27 NOTE — PROGRESS NOTES
[facility]  [facility] Record Number:  [id number]  Place of Service where encounter took place:  [facility] ([facility]) [[id number]]  Chief Complaint   Patient presents with     Nursing Home Acute       HPI:    Carine Rodriguez  is a 79 year old (1940), who is being seen today for an episodic care visit.  HPI information obtained from: facility chart records, facility staff, patient report and [facility] chart review. Today's concern is: has had itching the past ~~2 days amelia in [place] and at night--given extra zyrtec by on call--not much relief.  Seen today in room.     Pruritus of skin  Adenocarcinoma of left lung (H)  Duodenal ulceration  Anemia, unspecified type  Chronic obstructive pulmonary disease, unspecified COPD type (H)  Atrial fibrillation with RVR (H)  Bilateral leg edema  Physical deconditioning      Past Medical and Surgical History reviewed in Epic today.    MEDICATIONS:  Current Outpatient Medications   Medication Sig Dispense Refill     acetaminophen (TYLENOL) 325 MG tablet Take 650 mg by mouth every 6 hours as needed for mild pain       albuterol (PROAIR HFA/PROVENTIL HFA/VENTOLIN HFA) 108 (90 Base) MCG/ACT inhaler Inhale 2 puffs into the lungs every 4 hours as needed for shortness of breath / dyspnea or wheezing       atorvastatin (LIPITOR) 80 MG tablet Take 80 mg by mouth every evening       cetirizine (ZYRTEC) 10 MG tablet Take 10 mg by mouth daily       diltiazem ER COATED BEADS (CARDIZEM CD/CARTIA XT) 240 MG 24 hr capsule Take 240 mg by mouth daily       fluticasone-salmeterol (ADVAIR) 250-50 MCG/DOSE inhaler Inhale 1 puff into the lungs 2 times daily       folic acid (FOLVITE) 400 MCG tablet Take 400 mcg by mouth daily       furosemide (LASIX) 40 MG tablet Take 40 mg by mouth every morning       furosemide (LASIX) 40 MG tablet Take 20 mg by mouth daily (with lunch) 1/2 of a 40mg tab--take 20mg At 1200       [START ON 7/1/2019] hydrocortisone  1 % CREA cream Place rectally 2 times daily as needed for itching       Ipratropium-Albuterol (COMBIVENT RESPIMAT)  MCG/ACT inhaler Inhale 1 puff into the lungs 4 times daily as needed for shortness of breath / dyspnea or wheezing       latanoprost (XALATAN) 0.005 % ophthalmic solution Place 1 drop into both eyes daily       metoprolol tartrate (LOPRESSOR) 100 MG tablet Take 50 mg by mouth 2 times daily        ondansetron (ZOFRAN) 8 MG tablet Take 1 tablet (8 mg) by mouth every 8 hours as needed for nausea       OTHER MEDICAL SUPPLIES every shift Document O2 Sat and L/Min AND every evening shift every Mon for Oxygen Maintenance Change: Tubing, Mask &/or humidifier and date clean concentrator filter as applicable every Monday.       pantoprazole sodium (PROTONIX) 40 MG packet Take 1 packet by mouth 2 times daily       Polyethylene Glycol 1450 POWD 17 g daily as needed Give 17 gram by mouth as needed for constipation QD prn, for constipation up to 3 days, dissolve each 17 gm dose in 240mls (8oz) of beverage       potassium chloride ER (K-TAB) 20 MEQ CR tablet Take 20 mEq by mouth 2 times daily       senna-docusate (SENOKOT-S/PERICOLACE) 8.6-50 MG tablet Take 1 tablet by mouth 2 times daily as needed for constipation       sucralfate (CARAFATE) 1 GM tablet Take 1 g by mouth 4 times daily       tiotropium (SPIRIVA RESPIMAT) 2.5 MCG/ACT inhaler Inhale 2 puffs into the lungs daily       traMADol (ULTRAM) 50 MG tablet Take 50 mg by mouth every 4 hours as needed for severe pain       apixaban ANTICOAGULANT (ELIQUIS) 5 MG tablet Take 5 mg by mouth 2 times daily **ON HOLD SINCE ~5/16/19 PER BAUTISTA NOTES* Apixaban on hold for at least one month. (Due to GI ulcer bleed) on HOLD since 5/16/19 per notes and will NOT restart until OK'd by [place] oncologist per NP       Hydrocortisone (HC CREAM EX) Externally apply topically 2 times daily (1% cream)Apply to itchy areas bid x 5 days then bid prn          TODAY DURING EXAM/ROS:  No  "CP, SOB, Cough, dizziness, fevers, chills, HA, N/V, dysuria or Bowel Abnormalities. Appetite is reasonable.  No pain.    Objective:  /61   Pulse 65   Temp 95.5  F (35.3  C)   Resp 16   Ht 1.651 m (5' 5\")   Wt 84.2 kg (185 lb 9.6 oz)   SpO2 91%   BMI 30.89 kg/m       Exam:  GENERAL APPEARANCE:  Alert, in no distress, oriented, cooperative  ENT:  Mouth with moist mucous membranes, normal hearing acuity  EYES:  EOM, conjunctivae, lids, pupils and irises normal  RESP:  lungs clear to auscultation except a few exp wheezes. Has the moist nonproductive cough, as before.    CV:  Auscultation of heart done , IRRR, no murmur, rub, or gallop,+1 bilat pedal to mid calves' edema-left slightly > right. +2 pedal pulses  ABDOMEN:  normal bowel sounds, soft, nontender.  M/S:   STEPHENS Equally, up with assist  SKIN:  Inspection of skin and subcutaneous tissue baseline  NEURO:   Cranial nerves 2-12 are  grossly intact and at patient's baseline  PSYCH:  oriented X 3, normal insight, judgement and memory,        Labs:      Recent Labs   Lab Test 6/18/19([facility]) 06/14/19 06/12/19    137 138   POTASSIUM 4 3.7 2.7*   CHLORIDE 101 101 98   CO2 24 26 29   ANIONGAP  10 11   GLC  99 97   BUN 18 22 21   CR 1.39/GFR 36 1.55* 1.45*   MELANIE  9.2 8.9    6/18/19 at [place]:  Albumin 3/1. Protein 5.6, LFT'S WNL        Recent Labs   Lab Test 6/24/19 6/18/19  ([facility]) 6/14/19 05/13/19  0807   WBC    1.5*   RBC    3.31*   HGB 7.5 7.8 7.4 9.6*   HCT    30.5*   MCV    92   MCH    29.0   MCHC    31.5   RDW    15.3*   PLT  211  49*        < > = values in this interval not displayed.        ASSESSMENT / PLAN:  (L29.9) Pruritus of skin  (primary encounter diagnosis)  Comment/Plan: add HC cream, I reviewed SE's of Keytruda(immunotherapy), they include itching, nausea and cough.. I left msg with  [facility] who will have someone call me back.  I left the msg at 1020 am     (C34.92) Adenocarcinoma of left lung (H)  Comment/Plan: no O2, uses " CPAP,   RTC([facility]) in 3 weeks for more immunotherapy--Keytruda.     (D64.9) Anemia, unspecified type   (K26.9) Duodenal ulceration  Comment/Plan:  Hgb 7.5 on Monday and is steady, will check on      (J44.9) Chronic obstructive pulmonary disease, unspecified COPD type (H)  Comment/Plan: overall compensated, doing well, cont meds, POC ,nebs,  CPAP, monitor.     (I48.91) Atrial fibrillation with RVR (H)  Comment/Plan: rate controlled in 70-90 range . SBP in the 110-120's SBP mostly,cont same POC.   Apixaban was stopped ~May 16 th--no restart date yet.  Oncology to decide when to restart-~~ 1 month per [facility] notes.     (R60.0) Bilateral leg edema  Comment/Plan: improved, wts  187.6# today--running in the 185 to 187# range. Highest was  192.4#--has been trending downward since then. No changes to POC, monitor    (R53.81) Physical deconditioning  Comment/Plan: PT OT--Plan discontinue mid next week if all conts well.       [facility]: PHONE  [phone]  AND FAX [phone]     Electronically signed by:  BLAIR Davis CNP         Face to Face and Medical Necessity Statement for DME Provider visit    Demographic Information on Carine Rodriguez:  Gender: female  : 1940  [address]  [place] [address]  [phone] (home)     Medical Record: [id number]  Social Security Number: [id number]  Primary Care Provider: Collette Meza  Insurance: Payor: MEDICARE / Plan: MEDICARE / Product Type: Medicare /     HPI:   Carine Rodriguez is a 79 year old  (1940), who is being seen today for a face to face provider visit at [facility]; medical necessity statement for DME included. This patient requires the following:    DME Ordered and Medical Necessity Statement   Mechanical Wheelchair  Size: 18 x 16--gwen height due to short stature   Corresponding cushion: Yes: standard  Standard foot rests: Yes  Elevating leg rests: Yes  Arm rests: yes  Lap tray: No  Dose patient use  "oxygen? No   Able to propel w/c? Yes If no why not?   And is there someone who can?yes. Pt is able to propel WC with BUEs and LEs  Mobility related ADL that are affected in the home:   J44.9, C34.90. Will need for dressing, bathing and toileting and meal prep  The wheelchair is suitable and necessary for use in the patient's home.  Reason why a cane or walker will not meet the patient's needs. (ie: balance, tolerance, level of assistance) Tinetti is 20/28, Pt's O2 sats drop to 84%, with ambulation of over 100 feet, walking with walker or cane is unsafe due to O2 limitations  The patient has expressed willingness to use the wheelchair in the home and does have the physical and cognitive ability to maneuver the equipment or has a caregiver who is available, willing, and able to provide assistance in the home with the wheelchair.   Patients functional mobility deficit can be sufficiently resolved by the use of the above wheelchair      Pt needing above DME with expected length of need of 99    months  due to medical necessity associated with following diagnosis:     Pruritus of skin  Adenocarcinoma of left lung (H)  Duodenal ulceration  Anemia, unspecified type  Chronic obstructive pulmonary disease, unspecified COPD type (H)  Atrial fibrillation with RVR (H)  Bilateral leg edema  Physical deconditioning      PMH   has a past medical history of Cancer (H), COPD (chronic obstructive pulmonary disease) (H), and Hypertension.    ROS:see above exam    EXAM  Vitals: /61   Pulse 65   Temp 95.5  F (35.3  C)   Resp 16   Ht 1.651 m (5' 5\")   Wt 84.2 kg (185 lb 9.6 oz)   SpO2 91%   BMI 30.89 kg/m  ;BMI= Body mass index is 30.89 kg/m .    EXAM: see above exam    ASSESSMENT/PLAN:  1. Pruritus of skin    2. Adenocarcinoma of left lung (H)    3. Duodenal ulceration    4. Anemia, unspecified type    5. Chronic obstructive pulmonary disease, unspecified COPD type (H)    6. Atrial fibrillation with RVR (H)    7. Bilateral " leg edema    8. Physical deconditioning        Orders:  1. Facility staff/TC to contact DME company to get their order form for provider to fill out     ELECTRONICALLY SIGNED BY ITZ CERTIFIED PROVIDER:  BLAIR Davis CNP   NPI: [id number]  [facility]  [address] [place] [address], [address]  [place] [address]

## 2019-06-30 ASSESSMENT — MIFFLIN-ST. JEOR: SCORE: 1322.76

## 2019-07-01 ENCOUNTER — TRANSFERRED RECORDS (OUTPATIENT)
Dept: HEALTH INFORMATION MANAGEMENT | Facility: CLINIC | Age: 79
End: 2019-07-01

## 2019-07-01 ENCOUNTER — DISCHARGE SUMMARY NURSING HOME (OUTPATIENT)
Dept: GERIATRICS | Facility: CLINIC | Age: 79
End: 2019-07-01
Payer: MEDICARE

## 2019-07-01 ENCOUNTER — RECORDS - HEALTHEAST (OUTPATIENT)
Dept: LAB | Facility: CLINIC | Age: 79
End: 2019-07-01

## 2019-07-01 VITALS
BODY MASS INDEX: 30.92 KG/M2 | OXYGEN SATURATION: 94 % | DIASTOLIC BLOOD PRESSURE: 57 MMHG | SYSTOLIC BLOOD PRESSURE: 104 MMHG | TEMPERATURE: 97.2 F | WEIGHT: 185.6 LBS | RESPIRATION RATE: 16 BRPM | HEIGHT: 65 IN | HEART RATE: 100 BPM

## 2019-07-01 DIAGNOSIS — J96.00 ACUTE RESPIRATORY FAILURE, UNSPECIFIED WHETHER WITH HYPOXIA OR HYPERCAPNIA (H): Primary | ICD-10-CM

## 2019-07-01 DIAGNOSIS — I48.91 ATRIAL FIBRILLATION WITH RVR (H): ICD-10-CM

## 2019-07-01 DIAGNOSIS — D64.9 ANEMIA, UNSPECIFIED TYPE: ICD-10-CM

## 2019-07-01 DIAGNOSIS — I10 ESSENTIAL HYPERTENSION: ICD-10-CM

## 2019-07-01 DIAGNOSIS — K26.9 DUODENAL ULCERATION: ICD-10-CM

## 2019-07-01 DIAGNOSIS — J44.9 CHRONIC OBSTRUCTIVE PULMONARY DISEASE, UNSPECIFIED COPD TYPE (H): ICD-10-CM

## 2019-07-01 DIAGNOSIS — G47.33 OSA (OBSTRUCTIVE SLEEP APNEA): ICD-10-CM

## 2019-07-01 DIAGNOSIS — C34.92 ADENOCARCINOMA OF LEFT LUNG (H): ICD-10-CM

## 2019-07-01 DIAGNOSIS — R53.81 PHYSICAL DECONDITIONING: ICD-10-CM

## 2019-07-01 DIAGNOSIS — N18.30 CHRONIC RENAL INSUFFICIENCY, STAGE III (MODERATE) (H): ICD-10-CM

## 2019-07-01 LAB
ANION GAP SERPL CALCULATED.3IONS-SCNC: 12 MMOL/L (ref 5–18)
ANION GAP SERPL CALCULATED.3IONS-SCNC: 12 MMOL/L (ref 5–18)
BUN SERPL-MCNC: 20 MG/DL (ref 8–28)
BUN SERPL-MCNC: 20 MG/DL (ref 8–28)
CALCIUM SERPL-MCNC: 9.1 MG/DL (ref 8.5–10.5)
CALCIUM SERPL-MCNC: 9.1 MG/DL (ref 8.5–10.5)
CHLORIDE BLD-SCNC: 102 MMOL/L (ref 98–107)
CHLORIDE SERPLBLD-SCNC: 102 MMOL/L (ref 98–107)
CO2 SERPL-SCNC: 22 MMOL/L (ref 22–31)
CO2 SERPL-SCNC: 22 MMOL/L (ref 22–31)
CREAT SERPL-MCNC: 1.62 MG/DL (ref 0.6–1.1)
CREAT SERPL-MCNC: 1.62 MG/DL (ref 0.6–1.1)
GFR SERPL CREATININE-BSD FRML MDRD: 31 ML/MIN/1.73M2
GFR SERPL CREATININE-BSD FRML MDRD: 31 ML/MIN/1.73M2
GLUCOSE BLD-MCNC: 96 MG/DL (ref 70–125)
GLUCOSE SERPL-MCNC: 96 MG/DL (ref 70–125)
HEMOGLOBIN: 7.7 G/DL (ref 11.7–15.7)
HGB BLD-MCNC: 7.7 G/DL (ref 12–16)
PLATELET # BLD AUTO: 267 THOU/UL (ref 140–440)
PLATELET # BLD AUTO: 267 THOU/UL (ref 140–440)
POTASSIUM BLD-SCNC: 3.4 MMOL/L (ref 3.5–5)
POTASSIUM SERPL-SCNC: 3.4 MMOL/L (ref 3.5–5)
SODIUM SERPL-SCNC: 136 MMOL/L (ref 136–145)
SODIUM SERPL-SCNC: 136 MMOL/L (ref 136–145)

## 2019-07-01 PROCEDURE — 99316 NF DSCHRG MGMT 30 MIN+: CPT | Performed by: NURSE PRACTITIONER

## 2019-07-01 NOTE — PATIENT INSTRUCTIONS
[facility] Discharge Orders    Name: Carine Rodriguez  : 1940  Planned Discharge Date: 7/3/19  Discharged to: previous independent home    MEDICAL FOLLOW UP  Follow up with PCP in 1-2 weeks --pt/ family to make appt  Follow up with [facility] per prior arrangements      FUTURE LABS: No labs orders--pending today and will be sent to [facility]    ORDER CHANGES:  None--BAUTISTA and PCP to decide when to restart DOAC(Eliquis)--see below    DISCHARGE MEDICATIONS:  The patient s pharmacy is authorized to dispense a 30-day supply of medications. Refill requests should be directed to the primary provider, Collette Meza .   No narcotics are prescribed at time of discharge.   Current Outpatient Medications   Medication Sig Dispense Refill     acetaminophen (TYLENOL) 325 MG tablet Take 650 mg by mouth every 6 hours as needed for mild pain       albuterol (PROAIR HFA/PROVENTIL HFA/VENTOLIN HFA) 108 (90 Base) MCG/ACT inhaler Inhale 2 puffs into the lungs every 4 hours as needed for shortness of breath / dyspnea or wheezing       atorvastatin (LIPITOR) 80 MG tablet Take 80 mg by mouth every evening       cetirizine (ZYRTEC) 10 MG tablet Take 10 mg by mouth daily       diltiazem ER COATED BEADS (CARDIZEM CD/CARTIA XT) 240 MG 24 hr capsule Take 240 mg by mouth daily       fluticasone-salmeterol (ADVAIR) 250-50 MCG/DOSE inhaler Inhale 1 puff into the lungs 2 times daily       folic acid (FOLVITE) 400 MCG tablet Take 400 mcg by mouth daily       furosemide (LASIX) 40 MG tablet Take 40 mg by mouth every morning       furosemide (LASIX) 40 MG tablet Take 20 mg by mouth daily (with lunch) 1/2 of a 40mg tab--take 20mg At 1200       Hydrocortisone (HC CREAM EX) Externally apply topically 2 times daily (1% cream)Apply to itchy areas bid x 5 days then bid prn       Ipratropium-Albuterol (COMBIVENT RESPIMAT)  MCG/ACT inhaler Inhale 1 puff into the lungs 4 times daily as needed for shortness of breath /  dyspnea or wheezing       latanoprost (XALATAN) 0.005 % ophthalmic solution Place 1 drop into both eyes daily       metoprolol tartrate (LOPRESSOR) 100 MG tablet Take 50 mg by mouth 2 times daily        ondansetron (ZOFRAN) 8 MG tablet Take 1 tablet (8 mg) by mouth every 8 hours as needed for nausea       OTHER MEDICAL SUPPLIES every shift Document O2 Sat and L/Min AND every evening shift every Mon for Oxygen Maintenance Change: Tubing, Mask &/or humidifier and date clean concentrator filter as applicable every Monday.       pantoprazole sodium (PROTONIX) 40 MG packet Take 1 packet by mouth 2 times daily       Polyethylene Glycol 1450 POWD 17 g daily as needed Give 17 gram by mouth as needed for constipation QD prn, for constipation up to 3 days, dissolve each 17 gm dose in 240mls (8oz) of beverage       potassium chloride ER (K-TAB) 20 MEQ CR tablet Take 20 mEq by mouth 3 times daily (with meals)        senna-docusate (SENOKOT-S/PERICOLACE) 8.6-50 MG tablet Take 1 tablet by mouth 2 times daily as needed for constipation       sucralfate (CARAFATE) 1 GM tablet Take 1 g by mouth 4 times daily       tiotropium (SPIRIVA RESPIMAT) 2.5 MCG/ACT inhaler Inhale 2 puffs into the lungs daily       traMADol (ULTRAM) 50 MG tablet Take 50 mg by mouth every 4 hours as needed for severe pain       apixaban ANTICOAGULANT (ELIQUIS) 5 MG tablet Take 5 mg by mouth 2 times daily **ON HOLD SINCE ~5/16/19 PER BAUTISTA NOTES* Apixaban on hold for at least one month. (Due to GI ulcer bleed) on HOLD since 5/16/19 per notes and will NOT restart until OK'd by [place] oncologist per NP       hydrocortisone 1 % CREA cream Place rectally 2 times daily as needed for itching         SERVICES:  Home Care:  Occupational Therapy, Physical Therapy, Home Health Aide and From:  [facility]    ADDITIONAL INSTRUCTIONS:  None    BLAIR Davis CNP  This document was electronically signed on July 1, 2019

## 2019-07-01 NOTE — PROGRESS NOTES
[place] [facility] DISCHARGE SUMMARY    PATIENT'S NAME: Carine Rodriguez  YOB: 1940    PRIMARY CARE PROVIDER AND CLINIC RESPONSIBLE AFTER TRANSFER: Collette Meza     CODE STATUS:DNR/DNI    TRANSFERRING PROVIDERS: Paula Olivia, BLAIR TURCIOS, Dr. Swati Rodriguez,      DATE OF [facility] ADMISSION:  May / 13 / 2019    DATE OF SNF DISCHARGE (including anticipating DC):     DISCHARGE DISPOSITION: [facility] Provider    Nursing Facility: [facility] Hospital stay 19 to 19.     Condition on Discharge:  Improving.    Function:  Ambulatin ft/ w/fww, Transfers: s  Cognitive Scores: SLUMS  and Safety ?s:    Physical Function: [place] Balance Assessment:   Equipment: walker  DME: Walker    DISCHARGE DIAGNOSIS:      Acute respiratory failure, unspecified whether with hypoxia or hypercapnia (H)  Adenocarcinoma of left lung (H)  KINJAL (obstructive sleep apnea)  Chronic obstructive pulmonary disease, unspecified COPD type (H)  Anemia, unspecified type  Duodenal ulceration  Essential hypertension  Atrial fibrillation with RVR (H)  Chronic renal insufficiency, stage III (moderate) (H)  Physical deconditioning        HOSPITAL COURSE: Brief Summary of Hospital Course: pt went to [facility] from 19 to 19 for acute RESP failure and copd exacerbation with hypercapnia,until sent to [place] per family request as her primary Oncology team was there, on .    please see [facility].  She had worsening pancytopenia and decline.  She was diagnosed in 2018 with adenocarcinoma and underwent palliative chemo at that time. She was briefly in the ICU and on BIPAP at [place], she was found ob to be anemic with Hgb on 6.6 and a plts ct of 17 and was transfused. She had hematochezia and was found to have a duodenal ulcer for which she got IV PPI, she then had dental pain in Lt jaw, a tooth was broken  and it was pulled out on 6/6/19.  She was taken off her DOAC due to the anemia an GIB. please see [facility] notes for further data.    TCU/SNF COURSE: pt has progressed with PT and OT--lowest Hgb here was 7.5 but steady. K was low at 2.7 so replaced. Pt has done well with 2nd immunotherapy session at [place] which occurred 6/18 though has had cough and itching since then--this is common and HC cream has helped and cough improving.  [facility] has been updated along the way and all labs [place] as [place].      PAST MEDICAL HISTORY:  Past Medical History:   Diagnosis Date     Cancer (H)      COPD (chronic obstructive pulmonary disease) (H)      Hypertension        DISCHARGE MEDICATIONS:  Current Outpatient Medications   Medication Sig Dispense Refill     acetaminophen (TYLENOL) 325 MG tablet Take 650 mg by mouth every 6 hours as needed for mild pain       albuterol (PROAIR HFA/PROVENTIL HFA/VENTOLIN HFA) 108 (90 Base) MCG/ACT inhaler Inhale 2 puffs into the lungs every 4 hours as needed for shortness of breath / dyspnea or wheezing       atorvastatin (LIPITOR) 80 MG tablet Take 80 mg by mouth every evening       cetirizine (ZYRTEC) 10 MG tablet Take 10 mg by mouth daily       diltiazem ER COATED BEADS (CARDIZEM CD/CARTIA XT) 240 MG 24 hr capsule Take 240 mg by mouth daily       fluticasone-salmeterol (ADVAIR) 250-50 MCG/DOSE inhaler Inhale 1 puff into the lungs 2 times daily       folic acid (FOLVITE) 400 MCG tablet Take 400 mcg by mouth daily       furosemide (LASIX) 40 MG tablet Take 40 mg by mouth every morning       furosemide (LASIX) 40 MG tablet Take 20 mg by mouth daily (with lunch) 1/2 of a 40mg tab--take 20mg At 1200       Hydrocortisone (HC CREAM EX) Externally apply topically 2 times daily (1% cream)Apply to itchy areas bid x 5 days then bid prn       Ipratropium-Albuterol (COMBIVENT RESPIMAT)  MCG/ACT inhaler Inhale 1 puff into the lungs 4 times daily as needed for shortness of breath / dyspnea or wheezing    "    latanoprost (XALATAN) 0.005 % ophthalmic solution Place 1 drop into both eyes daily       metoprolol tartrate (LOPRESSOR) 100 MG tablet Take 50 mg by mouth 2 times daily        ondansetron (ZOFRAN) 8 MG tablet Take 1 tablet (8 mg) by mouth every 8 hours as needed for nausea       OTHER MEDICAL SUPPLIES every shift Document O2 Sat and L/Min AND every evening shift every Mon for Oxygen Maintenance Change: Tubing, Mask &/or humidifier and date clean concentrator filter as applicable every Monday.       pantoprazole sodium (PROTONIX) 40 MG packet Take 1 packet by mouth 2 times daily       Polyethylene Glycol 1450 POWD 17 g daily as needed Give 17 gram by mouth as needed for constipation QD prn, for constipation up to 3 days, dissolve each 17 gm dose in 240mls (8oz) of beverage       potassium chloride ER (K-TAB) 20 MEQ CR tablet Take 20 mEq by mouth 3 times daily (with meals)        senna-docusate (SENOKOT-S/PERICOLACE) 8.6-50 MG tablet Take 1 tablet by mouth 2 times daily as needed for constipation       sucralfate (CARAFATE) 1 GM tablet Take 1 g by mouth 4 times daily       tiotropium (SPIRIVA RESPIMAT) 2.5 MCG/ACT inhaler Inhale 2 puffs into the lungs daily       traMADol (ULTRAM) 50 MG tablet Take 50 mg by mouth every 4 hours as needed for severe pain       apixaban ANTICOAGULANT (ELIQUIS) 5 MG tablet Take 5 mg by mouth 2 times daily **ON HOLD SINCE ~5/16/19 PER BAUTISTA NOTES* Apixaban on hold for at least one month. (Due to GI ulcer bleed) on HOLD since 5/16/19 per notes and will NOT restart until OK'd by [place] oncologist per NP       hydrocortisone 1 % CREA cream Place rectally 2 times daily as needed for itching         MEDICATION CHANGES/RATIONALE:      /57   Pulse 100   Temp 97.2  F (36.2  C)   Resp 16   Ht 1.651 m (5' 5\")   Wt 84.2 kg (185 lb 9.6 oz)   SpO2 94%   BMI 30.89 kg/m      ROS:  No CP, SOB,  dizziness, fevers, chills, HA, N/V, dysuria or Bowel Abnormalities. Appetite is reasonable.  No " pain.  Cough improving. Says itching nearly gone    PHYSICAL EXAM TODAY:    GENERAL APPEARANCE:  Alert, in no distress, oriented, cooperative  ENT:  Mouth with moist mucous membranes, normal hearing acuity  EYES:  EOM, conjunctivae, lids, pupils and irises normal  RESP:  lungs clear to auscultation except a few exp wheezes. Has the moist nonproductive cough, as before.    CV:  Auscultation of heart done , IRRR, no murmur, rub, or gallop,+1 bilat pedal to mid calves' edema-left slightly > right. +2 pedal pulses  ABDOMEN:  normal bowel sounds, soft, nontender.  M/S:   STEPHENS Equally, up with assist  SKIN:  Inspection of skin and subcutaneous tissue baseline  NEURO:   Cranial nerves 2-12 are  grossly intact and at patient's baseline  PSYCH:  oriented X 3, normal insight, judgement and memory,      SNF LABS:  Recent Labs   Lab Test 07/01/19 06/14/19    137   POTASSIUM 3.4* 3.7   CHLORIDE 102 101   CO2 22 26   ANIONGAP 12 10   GLC 96 99   BUN 20 22   CR 1.62* 1.55*   MELANIE 9.1 9.2       Recent Labs   Lab Test 06/12/19      POTASSIUM 2.7*   CHLORIDE 98   CO2 29   ANIONGAP 11   GLC 97   BUN 21   CR 1.45*   MELANIE 8.9     CBC RESULTS:   CBC RESULTS:   Recent Labs   Lab Test 07/01/19   WBC  --    RBC  --    HGB 7.7*   HCT  --    MCV  --    MCH  --    MCHC  --    RDW  --        < > = values in this interval not displayed.       Recent Labs   Lab Test 06/24/19 05/13/19  0807   WBC  --  1.5*   RBC  --  3.31*   HGB 7.5* 9.6*   HCT  --  30.5*   MCV  --  92   MCH  --  29.0   MCHC  --  31.5   RDW  --  15.3*   PLT  --  49*    < > = values in this interval not displayed.             DISCHARGE PLAN:  Occupational Therapy, Physical Therapy, Home Health Aide and From:  [facility] Follow-up with PCP in 7 days: 7 days.    Current [place] or other scheduled appointments:No future appointments.    MTM referral needed and placed by this provider:  none    Pending labs: see above     Discharge Treatments:none       TOTAL  DISCHARGE TIME:   Greater than 30 minutes    BLAIR Davis CNP       Documentation of Face to Face and Certification for Home Health Services    I certify that patient: Carine Rodriguez is under my care and that I, or a nurse practitioner or physician's assistant working with me, had a face-to-face encounter that meets the physician face-to-face encounter requirements with this patient on: 7/1/2019.    This encounter with the patient was in whole, or in part, for the following medical condition, which is the primary reason for home health care:      Acute respiratory failure, unspecified whether with hypoxia or hypercapnia (H)  Adenocarcinoma of left lung (H)  KINJAL (obstructive sleep apnea)  Chronic obstructive pulmonary disease, unspecified COPD type (H)  Anemia, unspecified type  Duodenal ulceration  Essential hypertension  Atrial fibrillation with RVR (H)  Chronic renal insufficiency, stage III (moderate) (H)  Physical deconditioning  .    I certify that, based on my findings, the following services are medically necessary home health services: Nursing, Occupational Therapy and Physical Therapy.    My clinical findings support the need for the above services because: Occupational Therapy Services are needed to assess and treat cognitive ability and address ADL safety due to impairment in deconditioning and weakness. and Physical Therapy Services are needed to assess and treat the following functional impairments: Deconditioning and weakness and see diagnoses above.. NURSING needed for skin review, has had rash and itching from immunotherapy, low K, LE edema,  Coughing nausea.    Further, I certify that my clinical findings support that this patient is homebound (i.e. absences from home require considerable and taxing effort and are for medical reasons or [facility] services or infrequently or of short duration when for other reasons) because: Requires assistance of another person or specialized equipment  to access medical services because patient: Requires supervision of another for safe transfer...    Based on the above findings. I certify that this patient is confined to the home and needs intermittent skilled nursing care, physical therapy and/or speech therapy.  The patient is under my care, and I have initiated the establishment of the plan of care.  This patient will be followed by a physician who will periodically review the plan of care.  Physician/Provider to provide follow up care: Collette Meza    Attending hospital physician (the Medicare certified [facility] provider): Dr Swati Rodriguez MD    Physician Signature: See electronic signature associated with these discharge orders.  Date: 7/1/2019

## 2019-07-11 ENCOUNTER — TELEPHONE (OUTPATIENT)
Dept: GERIATRICS | Facility: CLINIC | Age: 79
End: 2019-07-11

## 2019-07-11 ASSESSMENT — MIFFLIN-ST. JEOR: SCORE: 1286.47

## 2019-07-11 NOTE — PROGRESS NOTES
[place] [facility]  PRIMARY CARE PROVIDER AND CLINIC:  Collette Meza MD, [facility] [address] / [place] *  Chief Complaint   Patient presents with     Establish Care     [facility] Medical Record Number:  [id number]  Place of Service where encounter took place:  [facility] [[id number]]     Acute respiratory failure, unspecified whether with hypoxia or hypercapnia (H)  Pneumonia of right upper lobe due to infectious organism (H)  Bilateral lung cancer (H)  Chronic obstructive pulmonary disease, unspecified COPD type (H)  KINJAL (obstructive sleep apnea)  Atrial fibrillation with RVR (H)  Acute renal failure superimposed on stage 3 chronic kidney disease, unspecified acute renal failure type (H)  Physical deconditioning  Essential hypertension  Dyslipidemia  Normocytic anemia  Gastrointestinal hemorrhage associated with duodenal ulcer    Carine Rodriguez  is a 79 year old  (1940), admitted to the above facility from  [facility]. Hospital stay 7/4/19 through 7/11/19..  Admitted to this facility for  rehab, medical management and nursing care.  **OF NOTE, PT recently here, from a stay at [facility] from 5/6/19 5/6 to 5/13/19 for acute RESP failure and copd exacerbation with hypercapnia,until sent to [place] per family request as her primary Oncology team was there, on 513/19.    please see [facility] notes for [facility] stay.  She had worsening pancytopenia and decline.  She was diagnosed in Nov 2018 with adenocarcinoma and underwent palliative chemo at that time. She was briefly in the ICU and on BIPAP at [place], she was found ob to be anemic with Hgb on 6.6 and a plts ct of 17 and was transfused. She had hematochezia and was found to have a duodenal ulcer for which she got IV PPI, she then had dental pain in Lt jaw, a tooth was broken and it was pulled out on 6/6/19.  She was taken off her DOAC due to the anemia an GIB. **     HPI  information obtained from: facility chart records, facility staff, patient report, [facility] chart review and Care Everywhere [facility] chart review.     Brief Summary of Hospital Course: pt was home for one day and got  SOB, chills, hypoxia, went to ER in [facility], found to have  RUL pneumonia, dehydrated, rapid AF.  She was rehydrated, and kidney function improved, CCB was increased with better rate control.  hypokalemia corrected. Put on Abs of Ceftriaxone and Doxycycline.  Echo done and EF was 0-65%.   CT of chest on 7/5 showed multiple enlarging and new pulmonary nodules and mediastinal adenopathy.  She also had a thoracentesis and breathing improved after that.  The atypical cells were suspicious for malignancy.  Her DOAC remains on hold due to her recent duodenal ulcer/GIB.     TODAY DURING EXAM/ HPI and ROS:  No CP, SOB,  dizziness, fevers, chills, HA, N/V, dysuria or Bowel Abnormalities. Appetite is better but does not like the low salt diet.  No pain.  Occas itching still but better(had last stay from the [facility] at [place].  Occas moist cough but difficult to cough anything up.      CODE STATUS/ADVANCE DIRECTIVES DISCUSSION:   DNR / DNI  Patient's living condition: lives alone  ALLERGIES: Lisinopril; Contrast dye; Amlodipine; Bupropion; Clarithromycin; Indomethacin; Propranolol; Penicillins; and Sulfa drugs  PAST MEDICAL HISTORY:  has a past medical history of Cancer (H), COPD (chronic obstructive pulmonary disease) (H), and Hypertension.  PAST SURGICAL HISTORY:   has a past surgical history that includes biopsy.  FAMILY HISTORY: family history is not on file.  SOCIAL HISTORY:   has an unknown smoking status. She has never used smokeless tobacco.    Post Discharge Medication Reconciliation Status: discharge medications reconciled and changed, per note/orders (see AVS)     Current Outpatient Medications   Medication Sig Dispense Refill     acetaminophen (TYLENOL) 325 MG tablet Take 650 mg by mouth every 6  hours as needed for mild pain       albuterol (PROAIR HFA/PROVENTIL HFA/VENTOLIN HFA) 108 (90 Base) MCG/ACT inhaler Inhale 2 puffs into the lungs every 4 hours as needed for shortness of breath / dyspnea or wheezing       arformoterol (BROVANA) 15 MCG/2ML NEBU neb solution Take 15 mcg by nebulization 2 times daily 1 vial inhale orally via nebulizer two times a day for Pneumonia of right upper lobe due to infectious organism related to CHRONIC OBSTRUCTIVE PULMONARY DISEASE, UNSPECIFIED (J44.9)       atorvastatin (LIPITOR) 80 MG tablet Take 40 mg by mouth every evening Dose decreased today       budesonide (PULMICORT) 0.5 MG/2ML neb solution Take 0.5 mg by nebulization 2 times daily 0.5 mg inhale orally two times a day for Pneumonia of right upper lobe due to infectious organism related to CHRONIC OBSTRUCTIVE PULMONARY DISEASE, UNSPECIFIED (J44.9)       cetirizine (ZYRTEC) 10 MG tablet Take 10 mg by mouth daily       diltiazem ER COATED BEADS (DILTIAZEM CD) 300 MG 24 hr capsule Take 300 mg by mouth every morning       folic acid (FOLVITE) 400 MCG tablet Take 400 mcg by mouth daily       furosemide (LASIX) 40 MG tablet Take 40 mg by mouth 2 times daily Give 1 tablet by mouth two times a day for Bilateral leg Edema Breakfast and lunch       hydrocortisone 1 % CREA cream Place rectally 2 times daily as needed for itching       ipratropium - albuterol 0.5 mg/2.5 mg/3 mL (DUONEB) 0.5-2.5 (3) MG/3ML neb solution Take 1 vial by nebulization 4 times daily And also taking every day prn for shortness of breath and wheezing       latanoprost (XALATAN) 0.005 % ophthalmic solution Place 1 drop into both eyes daily       metoprolol tartrate (LOPRESSOR) 100 MG tablet Take 100 mg by mouth 2 times daily        ondansetron (ZOFRAN) 8 MG tablet Take 1 tablet (8 mg) by mouth every 8 hours as needed for nausea       OTHER MEDICAL SUPPLIES every shift Document O2 Sat and L/Min AND every evening shift every Mon for Oxygen Maintenance  "Change: Tubing, Mask &/or humidifier and date clean concentrator filter as applicable every Monday.       pantoprazole sodium (PROTONIX) 40 MG packet Take 1 packet by mouth 2 times daily       Polyethylene Glycol 1450 POWD 17 g daily as needed Give 17 gram by mouth as needed for constipation QD prn, for constipation, dissolve each 17 gm dose in 240mls (8oz) of beverage       potassium chloride ER (K-TAB) 20 MEQ CR tablet Take 20 mEq by mouth 2 times daily        PREDNISONE PO Give 40 mg by mouth one time a day for Pneumonia / COPD exacerbation for 3 Days AND Give 30 mg by mouth one time a day for Pneumonia / COPD exacerbation for 3 Days AND Give 20 mg by mouth one time a day for Pneumonia / COPD exacerbation for 3 Days AND Give 10 mg by mouth one time a day for Pneumonia / COPD exacerbation for 3 Days       senna-docusate (SENOKOT-S/PERICOLACE) 8.6-50 MG tablet Take 1 tablet by mouth 2 times daily as needed for constipation       sucralfate (CARAFATE) 1 GM tablet Take 1 g by mouth 4 times daily       tiotropium (SPIRIVA) 18 MCG inhaled capsule Inhale 18 mcg into the lungs daily 1 capsule inhale orally one time a day for prevent bronchospasm related to CHRONIC OBSTRUCTIVE PULMONARY DISEASE, UNSPECIFIED (J44.9)       traMADol (ULTRAM) 50 MG tablet Take 50 mg by mouth every 4 hours as needed for severe pain       apixaban ANTICOAGULANT (ELIQUIS) 5 MG tablet Take 5 mg by mouth 2 times daily **ON HOLD SINCE ~5/16/19 PER BAUTISTA NOTES* Apixaban on hold for at least one month. (Due to GI ulcer bleed) on HOLD since 5/16/19 per notes and will NOT restart until OK'd by [facility] oncologist per NP          ROS:  See above    Vitals:  /65   Pulse 73   Temp 96.8  F (36  C)   Resp 18   Ht 1.651 m (5' 5\")   Wt 80.6 kg (177 lb 9.6 oz)   SpO2 97%   BMI 29.55 kg/m    Exam:  GENERAL APPEARANCE:  Alert, in no distress, oriented, cooperative  ENT:  Mouth and posterior oropharynx normal, moist mucous membranes, normal hearing " acuity  EYES:  EOM, conjunctivae, lids, pupils and irises normal  NECK:  No adenopathy,masses or thyromegaly  RESP:  lungs clear to auscultation except some mild insp and exp wheezes scattered thru out and decreased mildly.  CV:  Palpation and auscultation of heart done , IRRR, no murmur, rub, or gallop,<+1 bilat pedal pitting edema, +2 pedal pulses  ABDOMEN:  normal bowel sounds, soft, nontender, no hepatosplenomegaly or other masses  M/S:   STEPHENS equally, up with assist in room has walker  SKIN:  Inspection of skin and subcutaneous tissue baseline  NEURO:   Cranial nerves 2-12 are normal tested and grossly at patient's baseline  PSYCH:  oriented X 3, normal insight, judgement and memory,        Lab/Diagnostic data:  7/5/19:  WBC 12.3, Hgb 9.3, Creat 2.24, lactic acid 3.0, CXR showed RUL consolidation.  7/8/19:  Hgb 9.0  7/11/19: Creat 1.15    ASSESSMENT / PLAN:    (J96.00) Acute respiratory failure, unspecified whether with hypoxia or hypercapnia (H)  (primary encounter diagnosis)   (J18.1) Pneumonia of right upper lobe due to infectious organism (H)  Comment/Plan: cont with Abx, inhalers and nebs, IS,  Monitor.    (C34.91,  C34.92) Bilateral lung cancer (H)  Comment/Plan: daughter making further appts with [place]--?advancement of mets    (J44.9) Chronic obstructive pulmonary disease, unspecified COPD type (H)   (G47.33) KINJAL (obstructive sleep apnea)  Comment/Plan: cont current meds/nebs and O2 at night with the CPAP    (I48.91) Atrial fibrillation with RVR (H)  Comment/Plan: cont dilt at 300 mg daily, rate is controlled, monitor. Cannot yet be on an DOAC due to recent GIB    (N17.9,  N18.3) Acute renal failure superimposed on stage 3 chronic kidney disease, unspecified acute renal failure type (H)  Comment/Plan: better, will check here on 7/15    (R53.81) Physical deconditioning  Comment/Plan: PT OT    (I10) Essential hypertension  Comment/Plan: cont same meds, hold parameters placed on the dilt and metoprolol,  monitor.    (D64.9) Normocytic anemia  Comment/Plan: checking CBC on 7/15    (E78.5) Dyslipidemia  Comment/Plan: will decrease the statin. May help with her weakness and her main issues currently are her lungs, cancer and weakness.  Monitor.  PCP and Cards and adjust if they wish as outpt      Total time with patient visit: 60 minutes including discussions about the POC and care coordination with the patient. Greater than 50% of total time spent with counseling and coordinating care due to complexities of care and review of charts/discussion with pt and questions answered.  .    Electronically signed by:  BLAIR Davis CNP

## 2019-07-12 ENCOUNTER — NURSING HOME VISIT (OUTPATIENT)
Dept: GERIATRICS | Facility: CLINIC | Age: 79
End: 2019-07-12
Payer: MEDICARE

## 2019-07-12 VITALS
SYSTOLIC BLOOD PRESSURE: 105 MMHG | RESPIRATION RATE: 18 BRPM | BODY MASS INDEX: 29.59 KG/M2 | OXYGEN SATURATION: 97 % | HEIGHT: 65 IN | HEART RATE: 73 BPM | TEMPERATURE: 96.8 F | WEIGHT: 177.6 LBS | DIASTOLIC BLOOD PRESSURE: 65 MMHG

## 2019-07-12 DIAGNOSIS — J18.9 PNEUMONIA OF RIGHT UPPER LOBE DUE TO INFECTIOUS ORGANISM: ICD-10-CM

## 2019-07-12 DIAGNOSIS — D64.9 NORMOCYTIC ANEMIA: ICD-10-CM

## 2019-07-12 DIAGNOSIS — C34.91 BILATERAL LUNG CANCER (H): ICD-10-CM

## 2019-07-12 DIAGNOSIS — R53.81 PHYSICAL DECONDITIONING: ICD-10-CM

## 2019-07-12 DIAGNOSIS — E78.5 DYSLIPIDEMIA: ICD-10-CM

## 2019-07-12 DIAGNOSIS — K26.4 GASTROINTESTINAL HEMORRHAGE ASSOCIATED WITH DUODENAL ULCER: ICD-10-CM

## 2019-07-12 DIAGNOSIS — J96.00 ACUTE RESPIRATORY FAILURE, UNSPECIFIED WHETHER WITH HYPOXIA OR HYPERCAPNIA (H): Primary | ICD-10-CM

## 2019-07-12 DIAGNOSIS — C34.92 BILATERAL LUNG CANCER (H): ICD-10-CM

## 2019-07-12 DIAGNOSIS — I48.91 ATRIAL FIBRILLATION WITH RVR (H): ICD-10-CM

## 2019-07-12 DIAGNOSIS — N17.9 ACUTE RENAL FAILURE SUPERIMPOSED ON STAGE 3 CHRONIC KIDNEY DISEASE, UNSPECIFIED ACUTE RENAL FAILURE TYPE: ICD-10-CM

## 2019-07-12 DIAGNOSIS — J44.9 CHRONIC OBSTRUCTIVE PULMONARY DISEASE, UNSPECIFIED COPD TYPE (H): ICD-10-CM

## 2019-07-12 DIAGNOSIS — G47.33 OSA (OBSTRUCTIVE SLEEP APNEA): ICD-10-CM

## 2019-07-12 DIAGNOSIS — N18.3 ACUTE RENAL FAILURE SUPERIMPOSED ON STAGE 3 CHRONIC KIDNEY DISEASE, UNSPECIFIED ACUTE RENAL FAILURE TYPE: ICD-10-CM

## 2019-07-12 DIAGNOSIS — I10 ESSENTIAL HYPERTENSION: ICD-10-CM

## 2019-07-12 PROBLEM — E43 SEVERE PROTEIN-CALORIE MALNUTRITION (H): Status: ACTIVE | Noted: 2019-07-12

## 2019-07-12 PROBLEM — C80.1 ADENOCARCINOMA (H): Status: ACTIVE | Noted: 2018-04-26

## 2019-07-12 PROBLEM — E66.9 OBESITY (BMI 30-39.9): Status: ACTIVE | Noted: 2019-07-12

## 2019-07-12 PROBLEM — H40.9 GLAUCOMA, UNSPECIFIED GLAUCOMA TYPE, UNSPECIFIED LATERALITY: Status: ACTIVE | Noted: 2019-07-12

## 2019-07-12 PROBLEM — R91.8 PULMONARY NODULES: Status: ACTIVE | Noted: 2019-07-12

## 2019-07-12 PROCEDURE — 99310 SBSQ NF CARE HIGH MDM 45: CPT | Performed by: NURSE PRACTITIONER

## 2019-07-12 RX ORDER — BUDESONIDE 0.5 MG/2ML
0.5 INHALANT ORAL 2 TIMES DAILY
COMMUNITY

## 2019-07-12 RX ORDER — DILTIAZEM HYDROCHLORIDE 300 MG/1
300 CAPSULE, COATED, EXTENDED RELEASE ORAL EVERY MORNING
COMMUNITY

## 2019-07-12 RX ORDER — TIOTROPIUM BROMIDE 18 UG/1
18 CAPSULE ORAL; RESPIRATORY (INHALATION) DAILY
COMMUNITY

## 2019-07-12 RX ORDER — IPRATROPIUM BROMIDE AND ALBUTEROL SULFATE 2.5; .5 MG/3ML; MG/3ML
1 SOLUTION RESPIRATORY (INHALATION) 4 TIMES DAILY
COMMUNITY

## 2019-07-12 RX ORDER — ARFORMOTEROL TARTRATE 15 UG/2ML
15 SOLUTION RESPIRATORY (INHALATION) 2 TIMES DAILY
COMMUNITY

## 2019-07-12 NOTE — TELEPHONE ENCOUNTER
"Called to clarify the numerous inhaled medications.    1) Has ordered Arformoterol and Budesonide nebs \"Use if unable to properly administer Advair\"; she would prefer to use this combo of nebs rather than the Advair inhaler d/t ease of use of nebs and feeling they work better for her so I gave ok to discontinue Advair.     2) Continue Spiriva as ordered    3) Continue Albuterol PRN as ordered    4) Continue DuoNebs QID scheduled as ordered    5) Instead of the Combivent inhaler, given she prefers nebs, will allow an additional PRN dose of DuoNebs during the day in addition to her QID scheduled dosing as noted above. Discontinue Combivent inhaler.    Primary NP Paula Olivia to feel free to make any adjustments when she sees Carine tomorrow.    Tierney Hoffman, DO           "

## 2019-07-14 ASSESSMENT — MIFFLIN-ST. JEOR: SCORE: 1275.58

## 2019-07-15 ENCOUNTER — TRANSFERRED RECORDS (OUTPATIENT)
Dept: HEALTH INFORMATION MANAGEMENT | Facility: CLINIC | Age: 79
End: 2019-07-15

## 2019-07-15 ENCOUNTER — RECORDS - HEALTHEAST (OUTPATIENT)
Dept: LAB | Facility: CLINIC | Age: 79
End: 2019-07-15

## 2019-07-15 ENCOUNTER — NURSING HOME VISIT (OUTPATIENT)
Dept: GERIATRICS | Facility: CLINIC | Age: 79
End: 2019-07-15
Payer: MEDICARE

## 2019-07-15 VITALS
RESPIRATION RATE: 18 BRPM | DIASTOLIC BLOOD PRESSURE: 70 MMHG | OXYGEN SATURATION: 97 % | WEIGHT: 174 LBS | HEART RATE: 73 BPM | TEMPERATURE: 96.9 F | BODY MASS INDEX: 28.99 KG/M2 | SYSTOLIC BLOOD PRESSURE: 120 MMHG | HEIGHT: 65 IN

## 2019-07-15 DIAGNOSIS — I10 ESSENTIAL HYPERTENSION: ICD-10-CM

## 2019-07-15 DIAGNOSIS — R53.81 PHYSICAL DECONDITIONING: ICD-10-CM

## 2019-07-15 DIAGNOSIS — N17.9 ACUTE RENAL FAILURE SUPERIMPOSED ON STAGE 3 CHRONIC KIDNEY DISEASE, UNSPECIFIED ACUTE RENAL FAILURE TYPE: ICD-10-CM

## 2019-07-15 DIAGNOSIS — J44.9 CHRONIC OBSTRUCTIVE PULMONARY DISEASE, UNSPECIFIED COPD TYPE (H): ICD-10-CM

## 2019-07-15 DIAGNOSIS — C34.91 BILATERAL LUNG CANCER (H): ICD-10-CM

## 2019-07-15 DIAGNOSIS — I48.91 ATRIAL FIBRILLATION WITH RVR (H): ICD-10-CM

## 2019-07-15 DIAGNOSIS — B37.0 ORAL THRUSH: ICD-10-CM

## 2019-07-15 DIAGNOSIS — D64.9 NORMOCYTIC ANEMIA: ICD-10-CM

## 2019-07-15 DIAGNOSIS — J18.9 PNEUMONIA OF RIGHT UPPER LOBE DUE TO INFECTIOUS ORGANISM: Primary | ICD-10-CM

## 2019-07-15 DIAGNOSIS — C34.92 BILATERAL LUNG CANCER (H): ICD-10-CM

## 2019-07-15 DIAGNOSIS — N18.3 ACUTE RENAL FAILURE SUPERIMPOSED ON STAGE 3 CHRONIC KIDNEY DISEASE, UNSPECIFIED ACUTE RENAL FAILURE TYPE: ICD-10-CM

## 2019-07-15 LAB
ANION GAP SERPL CALCULATED.3IONS-SCNC: 16 MMOL/L (ref 5–18)
ANION GAP SERPL CALCULATED.3IONS-SCNC: 16 MMOL/L (ref 5–18)
BASOPHILS # BLD AUTO: 0 THOU/UL (ref 0–0.2)
BASOPHILS NFR BLD AUTO: 0 % (ref 0–2)
BUN SERPL-MCNC: 25 MG/DL (ref 8–28)
BUN SERPL-MCNC: 25 MG/DL (ref 8–28)
CALCIUM SERPL-MCNC: 10.7 MG/DL (ref 8.5–10.5)
CALCIUM SERPL-MCNC: 10.7 MG/DL (ref 8.5–10.5)
CHLORIDE BLD-SCNC: 98 MMOL/L (ref 98–107)
CHLORIDE SERPLBLD-SCNC: 98 MMOL/L (ref 98–107)
CO2 SERPL-SCNC: 26 MMOL/L (ref 22–31)
CO2 SERPL-SCNC: 26 MMOL/L (ref 22–31)
CREAT SERPL-MCNC: 1.31 MG/DL (ref 0.6–1.1)
CREAT SERPL-MCNC: 1.31 MG/DL (ref 0.6–1.1)
DIFFERENTIAL: ABNORMAL
EOSINOPHIL # BLD AUTO: 0.1 THOU/UL (ref 0–0.4)
EOSINOPHIL NFR BLD AUTO: 0 % (ref 0–6)
ERYTHROCYTE [DISTWIDTH] IN BLOOD BY AUTOMATED COUNT: 18.7 % (ref 11–14.5)
ERYTHROCYTE [DISTWIDTH] IN BLOOD BY AUTOMATED COUNT: 18.7 % (ref 11–14.5)
GFR SERPL CREATININE-BSD FRML MDRD: 39 ML/MIN/1.73M2
GFR SERPL CREATININE-BSD FRML MDRD: 39 ML/MIN/1.73M2
GLUCOSE BLD-MCNC: 82 MG/DL (ref 70–125)
GLUCOSE SERPL-MCNC: 82 MG/DL (ref 70–125)
HCT VFR BLD AUTO: 33.3 % (ref 35–47)
HCT VFR BLD AUTO: 33.3 % (ref 35–47)
HEMOGLOBIN: 9.6 G/DL (ref 12–16)
HGB BLD-MCNC: 9.6 G/DL (ref 12–16)
LYMPHOCYTES # BLD AUTO: 0.8 THOU/UL (ref 0.8–4.4)
LYMPHOCYTES NFR BLD AUTO: 5 % (ref 20–40)
MCH RBC QN AUTO: 28.2 PG (ref 27–34)
MCH RBC QN AUTO: 28.2 PG (ref 27–34)
MCHC RBC AUTO-ENTMCNC: 28.8 G/DL (ref 32–36)
MCHC RBC AUTO-ENTMCNC: 28.8 G/DL (ref 32–36)
MCV RBC AUTO: 98 FL (ref 80–100)
MCV RBC AUTO: 98 FL (ref 80–100)
MONOCYTES # BLD AUTO: 1.1 THOU/UL (ref 0–0.9)
MONOCYTES NFR BLD AUTO: 6 % (ref 2–10)
NEUTROPHILS # BLD AUTO: 15.1 THOU/UL (ref 2–7.7)
NEUTROPHILS NFR BLD AUTO: 89 % (ref 50–70)
PLATELET # BLD AUTO: 210 THOU/UL (ref 140–440)
PLATELET # BLD AUTO: 210 THOU/UL (ref 140–440)
PMV BLD AUTO: 11.5 FL (ref 8.5–12.5)
POTASSIUM BLD-SCNC: 4.2 MMOL/L (ref 3.5–5)
POTASSIUM SERPL-SCNC: 4.2 MMOL/L (ref 3.5–5)
RBC # BLD AUTO: 3.41 MILL/UL (ref 3.8–5.4)
RBC # BLD AUTO: 3.41 MILL/UL (ref 3.8–5.4)
SODIUM SERPL-SCNC: 140 MMOL/L (ref 136–145)
SODIUM SERPL-SCNC: 140 MMOL/L (ref 136–145)
WBC # BLD AUTO: 17.4 THOU/UL (ref 4–11)
WBC: 17.4 THOU/UL (ref 4–11)

## 2019-07-15 PROCEDURE — 99309 SBSQ NF CARE MODERATE MDM 30: CPT | Performed by: NURSE PRACTITIONER

## 2019-07-15 RX ORDER — POLYETHYLENE GLYCOL 3350 17 G/17G
17 POWDER, FOR SOLUTION ORAL DAILY PRN
COMMUNITY
End: 2019-07-22

## 2019-07-15 ASSESSMENT — MIFFLIN-ST. JEOR: SCORE: 1270.14

## 2019-07-15 NOTE — PROGRESS NOTES
"[facility]  [facility] Record Number:  [id number]  Place of Service where encounter took place:  [facility] ([facility] [[id number]]  Chief Complaint   Patient presents with     Nursing Home Acute       HPI:    Carine Rodriguez  is a 79 year old (1940), who is being seen today for an episodic care visit.  HPI information obtained from: facility chart records, facility staff, patient report and [facility] chart review. Today's concern is:     Pneumonia of right upper lobe due to infectious organism (H)  Bilateral lung cancer (H)  Chronic obstructive pulmonary disease, unspecified COPD type (H)  Atrial fibrillation with RVR (H)  Acute renal failure superimposed on stage 3 chronic kidney disease, unspecified acute renal failure type (H)  Physical deconditioning  Oral thrush  Essential hypertension  Normocytic anemia      TODAY'S HPI and ROS:  No CP, SOB,  dizziness, fevers, chills, HA, N/V, dysuria. Feels constipated. Appetite is better. No pain.   Occas moist cough but lessening.  C/o sore mouth \"Do  have thrush?\"    Past Medical and Surgical History reviewed in Epic today.    MEDICATIONS:  Current Outpatient Medications   Medication Sig Dispense Refill     acetaminophen (TYLENOL) 325 MG tablet Take 650 mg by mouth every 6 hours as needed for mild pain       albuterol (PROAIR HFA/PROVENTIL HFA/VENTOLIN HFA) 108 (90 Base) MCG/ACT inhaler Inhale 2 puffs into the lungs every 4 hours as needed for shortness of breath / dyspnea or wheezing       arformoterol (BROVANA) 15 MCG/2ML NEBU neb solution Take 15 mcg by nebulization 2 times daily 1 vial inhale orally via nebulizer two times a day for Pneumonia of right upper lobe due to infectious organism related to CHRONIC OBSTRUCTIVE PULMONARY DISEASE, UNSPECIFIED (J44.9)       atorvastatin (LIPITOR) 80 MG tablet Take 40 mg by mouth every evening Dose decreased today       budesonide (PULMICORT) 0.5 MG/2ML neb solution Take " 0.5 mg by nebulization 2 times daily 0.5 mg inhale orally two times a day for Pneumonia of right upper lobe due to infectious organism related to CHRONIC OBSTRUCTIVE PULMONARY DISEASE, UNSPECIFIED (J44.9)       cetirizine (ZYRTEC) 10 MG tablet Take 10 mg by mouth daily       diltiazem ER COATED BEADS (DILTIAZEM CD) 300 MG 24 hr capsule Take 300 mg by mouth every morning       folic acid (FOLVITE) 400 MCG tablet Take 400 mcg by mouth daily       furosemide (LASIX) 40 MG tablet Take 40 mg by mouth 2 times daily Give 1 tablet by mouth two times a day for Bilateral leg Edema Breakfast and lunch       hydrocortisone 1 % CREA cream Place rectally 2 times daily as needed for itching       ipratropium - albuterol 0.5 mg/2.5 mg/3 mL (DUONEB) 0.5-2.5 (3) MG/3ML neb solution Take 1 vial by nebulization 4 times daily And also taking every day prn for shortness of breath and wheezing       latanoprost (XALATAN) 0.005 % ophthalmic solution Place 1 drop into both eyes daily       metoprolol tartrate (LOPRESSOR) 100 MG tablet Take 100 mg by mouth 2 times daily        nystatin (MYCOSTATIN) 340271 unit/mL SUSP suspension Swish and swallow 50,000 Units in mouth 4 times daily X 7 days. For thursh       ondansetron (ZOFRAN) 8 MG tablet Take 1 tablet (8 mg) by mouth every 8 hours as needed for nausea       OTHER MEDICAL SUPPLIES GOOD oral care and tongue brushing tid and prn       OTHER MEDICAL SUPPLIES every shift Document O2 Sat and L/Min AND every evening shift every Mon for Oxygen Maintenance Change: Tubing, Mask &/or humidifier and date clean concentrator filter as applicable every Monday.       pantoprazole sodium (PROTONIX) 40 MG packet Take 1 packet by mouth 2 times daily       polyethylene glycol (MIRALAX/GLYCOLAX) packet Take 17 g by mouth daily as needed for constipation In glass of fluid daily prn for constipation       Polyethylene Glycol 1450 POWD 17 g daily as needed Give 17 gram by mouth as needed for constipation QD prn,  "for constipation, dissolve each 17 gm dose in 240mls (8oz) of beverage       potassium chloride ER (K-TAB) 20 MEQ CR tablet Take 20 mEq by mouth 2 times daily        PREDNISONE PO Give 40 mg by mouth one time a day for Pneumonia / COPD exacerbation for 3 Days AND Give 30 mg by mouth one time a day for Pneumonia / COPD exacerbation for 3 Days AND Give 20 mg by mouth one time a day for Pneumonia / COPD exacerbation for 3 Days AND Give 10 mg by mouth one time a day for Pneumonia / COPD exacerbation for 3 Days       senna-docusate (SENOKOT-S/PERICOLACE) 8.6-50 MG tablet Take 1 tablet by mouth 2 times daily as needed for constipation       sucralfate (CARAFATE) 1 GM tablet Take 1 g by mouth 4 times daily       tiotropium (SPIRIVA) 18 MCG inhaled capsule Inhale 18 mcg into the lungs daily 1 capsule inhale orally one time a day for prevent bronchospasm related to CHRONIC OBSTRUCTIVE PULMONARY DISEASE, UNSPECIFIED (J44.9)       apixaban ANTICOAGULANT (ELIQUIS) 5 MG tablet Take 5 mg by mouth 2 times daily **ON HOLD SINCE ~5/16/19 PER BA[facility]ISTA NOTES* Apixaban on hold for at least one month. (Due to GI ulcer bleed) on HOLD since 5/16/19 per notes and will NOT restart until OK'd by [facility] oncologist per NP            REVIEW OF SYSTEMS:  See above    Objective:  /70   Pulse 73   Temp 96.9  F (36.1  C)   Resp 18   Ht 1.651 m (5' 5\")   Wt 78.9 kg (174 lb)   SpO2 97%   BMI 28.96 kg/m     Exam:  GENERAL APPEARANCE:  Alert, in no distress, oriented, cooperative  ENT:  Mouth and posterior oropharynx normal except has oral thrush on tongue and back of throat. has moist mucous membranes, normal hearing acuity   RESP:  lungs clear to auscultation except some mild wheezes scattered on right side in base.  CV:  Auscultation of heart done , IRRR, no murmur, rub, or gallop,+1-2 bilat pedal pitting edema, +2 pedal pulses  ABDOMEN:  normal bowel sounds, soft, nontender.  M/S:   STEPHENS equally, up with assist in room has walker  SKIN:  " Inspection of skin and subcutaneous tissue baseline  NEURO:   Cranial nerves 2-12 are normal tested and grossly at patient's baseline  PSYCH:  oriented X 3, normal insight, judgement and memory,        Labs:   Recent Labs   Lab Test 07/15/19 07/01/19    136   POTASSIUM 4.2 3.4*   CHLORIDE 98 102   CO2 26 22   ANIONGAP 16 12   GLC 82 96   BUN 25 20   CR 1.31* 1.62*   MELANIE 10.7* 9.1     CBC RESULTS:   Recent Labs   Lab Test 07/15/19   WBC 17.4*   RBC 3.41*   HGB 9.6*   HCT 33.3*   MCV 98   MCH 28.2   MCHC 28.8*   RDW 18.7*        **AT [facility](Lab/Diagnostic data:)**  7/5/19:  WBC 12.3, Hgb 9.3, Creat 2.24, lactic acid 3.0, CXR showed RUL consolidation.  7/8/19:  Hgb 9.0  7/11/19: Creat 1.15      ASSESSMENT / PLAN:   (J18.1) Pneumonia of right upper lobe due to infectious organism (H)  Comment/Plan: improving, will cont with Abx, inhalers and nebs, IS,  Monitor.    (C34.91,  C34.92) Bilateral lung cancer (H)  Comment/Plan: daughter has appts with [facility] on 7.17 and 7/18--?advancement of mets    (J44.9) Chronic obstructive pulmonary disease, unspecified COPD type (H)  Comment/Plan: cont current meds/nebs and O2 at night with the CPAP for her KINJAL.    (I48.91) Atrial fibrillation with RVR (H)  Comment/Plan: cont diltiazem at 300 mg daily, rate is controlled, monitor. Cannot be on an DOAC due to recent GIB    (N17.9,  N18.3) Acute renal failure superimposed on stage 3 chronic kidney disease, unspecified acute renal failure type (H)  Comment/Plan: better with the recheck today, check prn, send labs to [facility]    (R53.81) Physical deconditioning  Comment/Plan: getting a bit stronger, but still felt weak.  Cont PT OT    (I10) Essential hypertension  Comment/Plan: cont same meds-- diltiazem and metoprolol, monitor.    (D64.9) Normocytic anemia  Comment/Plan: Hgb is steady today. Labs will be sent to [facility]    (B37.0) Oral thrush  Comment/Plan: good oral care and nystatin swish and swallow.  Monitor.      Electronically signed by:  BLAIR Davis CNP

## 2019-07-17 ASSESSMENT — MIFFLIN-ST. JEOR: SCORE: 1268.32

## 2019-07-18 ENCOUNTER — NURSING HOME VISIT (OUTPATIENT)
Dept: GERIATRICS | Facility: CLINIC | Age: 79
End: 2019-07-18
Payer: MEDICARE

## 2019-07-18 VITALS
SYSTOLIC BLOOD PRESSURE: 120 MMHG | TEMPERATURE: 97 F | RESPIRATION RATE: 19 BRPM | OXYGEN SATURATION: 94 % | WEIGHT: 173.6 LBS | DIASTOLIC BLOOD PRESSURE: 65 MMHG | BODY MASS INDEX: 28.92 KG/M2 | HEART RATE: 80 BPM | HEIGHT: 65 IN

## 2019-07-18 DIAGNOSIS — N18.3 ACUTE RENAL FAILURE SUPERIMPOSED ON STAGE 3 CHRONIC KIDNEY DISEASE, UNSPECIFIED ACUTE RENAL FAILURE TYPE: ICD-10-CM

## 2019-07-18 DIAGNOSIS — R53.81 PHYSICAL DECONDITIONING: ICD-10-CM

## 2019-07-18 DIAGNOSIS — B37.0 ORAL THRUSH: ICD-10-CM

## 2019-07-18 DIAGNOSIS — K26.9 DUODENAL ULCERATION: ICD-10-CM

## 2019-07-18 DIAGNOSIS — G47.33 OSA (OBSTRUCTIVE SLEEP APNEA): ICD-10-CM

## 2019-07-18 DIAGNOSIS — N18.30 CHRONIC RENAL INSUFFICIENCY, STAGE III (MODERATE) (H): ICD-10-CM

## 2019-07-18 DIAGNOSIS — N17.9 ACUTE RENAL FAILURE SUPERIMPOSED ON STAGE 3 CHRONIC KIDNEY DISEASE, UNSPECIFIED ACUTE RENAL FAILURE TYPE: ICD-10-CM

## 2019-07-18 DIAGNOSIS — I48.91 ATRIAL FIBRILLATION WITH RVR (H): Primary | ICD-10-CM

## 2019-07-18 DIAGNOSIS — C34.92 ADENOCARCINOMA OF LEFT LUNG (H): ICD-10-CM

## 2019-07-18 DIAGNOSIS — I10 ESSENTIAL HYPERTENSION: ICD-10-CM

## 2019-07-18 DIAGNOSIS — J44.9 CHRONIC OBSTRUCTIVE PULMONARY DISEASE, UNSPECIFIED COPD TYPE (H): ICD-10-CM

## 2019-07-18 PROCEDURE — 99305 1ST NF CARE MODERATE MDM 35: CPT | Mod: AI | Performed by: INTERNAL MEDICINE

## 2019-07-18 NOTE — LETTER
7/18/2019        RE: Carine Rodriguez  [address]  [place] [address]        Carine Rodriguez is a 79 year old female [facility] July [facility], [facility] this month after [facility] stay 7/4-11 for atrial fibrillation with RVR.   She had acute hypoxic respiratory failure thought to be secondary to pneumonia vs biologic-induced pneumonitis vs underlying lung cancer with possible pembrolizumab toxicity. Pt follows at [place] for bilateral lung adnenocarcinoma, and saw her Oncologist there yesterday.   States they agreed to hold treatment at this point, and she is good with that decision.   Seen in her room, doing exercises with Occupational therapist.   Denies pain of any kind, reports her breathing is better and thrush resolving.         Previous history includes [facility] hospitalization 5/6-5/13, then [place] hospitalization 5/13-6/7 for acute respiratory failure with hypoxia, COPD exacerbation, left lung adenocarcinoma, atrial fibrillation and KINJAL.     Pt has a h/o left lung adenocarcinoma, s/p XRT in 11/2018, and on 4/30/19 received cycle 1 of palliative CTX with permetrexed, carboplatin and pembrolizumab.   She presented 5/6 with respiratory distress and was treated with BiPAP, nebs, steroid taper and abx.  Also found to have atrial fib with RVR.    However apixaban was held on 5/16 because of anemia and thrombocytopenia secondary to bone marrow suppression from her CTX.   Also had proximal DU with perforation.   She was treated with IV PPI and multiple transfusions.  She eventually stabilized and transferred to TCU for Rehab, then discharged home on 7/3   [facility] nurse referred pt back to the hospital on 7/4 for rapid atrial fib and dyspnea.   Thoracentesis improved her symptoms, with cytology + for atypical cells.     Apixaban remains on hold    Past Medical History:   Diagnosis Date     Cancer (H)  Adenocarcinoma of the lung Stage IA3, 2018      COPD (chronic  "obstructive pulmonary disease) (H)      Hypertension    KINJAL  Atrial fibrillation  Duodenal ulcer  HTN / CKD stage 3  Glaucoma    Past Surgical History:   Procedure Laterality Date     BIOPSY     Appendectomy   Partial nephrectomy for nephrolithiasis    SH:  Lives alone, house in [facility], with stairs to get in.  She has 3 daughters.   Smoker [id number]    FH: father  with colon cancer in his 60s, also had CHF and  at age 85.   Mother had a h/o cancer and HTN       Review Of Systems  Skin: negative   Eyes: impaired vision, glasses  Ears/Nose/Throat: hearing loss  Respiratory: as above    Cardiovascular: irregular heart beat, dyspnea on exertion and exercise intolerance  Gastrointestinal: negative  Genitourinary: negative  Musculoskeletal: min assist with ADLs, supervision for transfers, ambulates 222' with 4WW and SBA    Tinetti    4WW with a yellow tag.   TUG 37sec.     Neurologic: negative, SLUMS , safety questionnaire    Psychiatric: negative   Hematologic/Lymphatic/Immunologic: multiple allergies to medications, anemia   Endocrine: negative   Wt Readings from Last 5 Encounters:   19 78.7 kg (173 lb 9.6 oz)   07/15/19 78.9 kg (174 lb)   19 80.6 kg (177 lb 9.6 oz)   19 84.2 kg (185 lb 9.6 oz)   19 84.2 kg (185 lb 9.6 oz)    Was 207# in May         GENERAL APPEARANCE: alert and no distress  /65   Pulse 80   Temp 97  F (36.1  C)   Resp 19   Ht 1.651 m (5' 5\")   Wt 78.7 kg (173 lb 9.6 oz)   SpO2 94%   BMI 28.89 kg/m      HEENT: normocephalic, no lesion or abnormalities, tongue with erythema and some white patches, clearing   NECK: no adenopathy, no asymmetry, masses, or scars and thyroid normal to palpation  RESP: decreased BS, scattered wheezes and rhonchi  CV: irregular rate and rhythm, normal S1 S2, distant heart sounds  ABDOMEN:  soft, nontender, no HSM or masses and bowel sounds normal  MS: extremities normal, trace LE edema with chronic skin changes.   " .  SKIN: no suspicious lesions or rashes  NEURO: Normal strength and tone, sensory exam grossly normal, and speech normal  PSYCH: affect okay  LYMPHATICS: No cervical,  or supraclavicular nodes     Last Comprehensive Metabolic Panel:  Sodium   Date Value Ref Range Status   07/15/2019 140 136 - 145 mmol/L Final     Potassium   Date Value Ref Range Status   07/15/2019 4.2 3.5 - 5.0 mmol/L Final     Chloride   Date Value Ref Range Status   07/15/2019 98 98 - 107 mmol/L Final     Carbon Dioxide   Date Value Ref Range Status   07/15/2019 26 22 - 31 mmol/L Final     Anion Gap   Date Value Ref Range Status   07/15/2019 16 5 - 18 mmol/L Final     Glucose   Date Value Ref Range Status   07/15/2019 82 70 - 125 mg/dL Final     Urea Nitrogen   Date Value Ref Range Status   07/15/2019 25 8 - 28 mg/dL Final     Creatinine   Date Value Ref Range Status   07/15/2019 1.31 (H) 0.60 - 1.10 mg/dL Final     GFR Estimate   Date Value Ref Range Status   07/15/2019 39 (L) >60 ml/min/1.73m2 Final     Calcium   Date Value Ref Range Status   07/15/2019 10.7 (H) 8.5 - 10.5 mg/dL Final     Lab Results   Component Value Date    PROTTOTAL 6.7 05/09/2019      Lab Results   Component Value Date    WBC 17.4 07/15/2019      HGB 9.6 07/15/2019      MCV 98 07/15/2019       07/15/2019     ECHO 7/5/19    Heart Rate:     105  Heart Rhythm:     Regular  Summary:    * Prior study from 01/28/19 .    * The left ventricle is normal in size.    * There is severe concentric left ventricular hypertrophy.    * The estimated ejection fraction is 60-65%.    * Left ventricular segmental wall motion is normal.      IMP/PLAN:   (J96.00) Acute respiratory failure, unspecified whether with hypoxia or hypercapnia (H)   (J44.9) Chronic obstructive pulmonary disease, unspecified COPD type (H)  Comment: improved respiratory status, FEV1 0.5, +BD response  Plan: Combivent MDI, Spiriva, Pulmicort nebs, Brovana and prednisone taper    (C34.92) Adenocarcinoma of left lung  (H)  Comment: s/p XRT and CTX; did not tolerate last cycle     Plan: Saw her Oncologist at [facility] yesterday, and they agreed to hold off on further treatment at this point.       (D64.9) Anemia, unspecified type  Comment: blood loss and bone marrow suppression, hgb still low but improved over last month.    Plan: follow hgb    (K26.9) Duodenal ulceration  Comment: with perforation   Plan: pantoprazole and sucralfate    (I48.91) Atrial fibrillation with RVR (H)  Comment:   Pulse Readings from Last 4 Encounters:   07/17/19 80   07/15/19 73   07/12/19 73   06/30/19 100      Plan: VR control with diltiazem and metoprolol  Apixaban is on hold secondary to GI bleed and anemia.   Resumption once she is more stable, determined by [facility].       (G47.33) KINJAL (obstructive sleep apnea)  Comment: on CPAP   Plan: with O2, home settings       (N18.3) Chronic renal insufficiency, stage III (moderate) (H)  Comment: GFR 39  Plan: renal dosing of medications and recheck BMP on furosemide    (I10) Essential hypertension  Comment:   BP Readings from Last 3 Encounters:   07/17/19 120/65   07/15/19 120/70   07/12/19 105/65      Plan: metoprolol, diltiazem at current doses    (B37.0) Oral thrush  Comment: oral cares  Plan: Nystatin swish and swallow     (R53.81) Physical deconditioning  Comment: after prolonged illness   Plan: PHYSICAL THERAPY / OCCUPATIONAL THERAPY for strengthening, endurance, ADLs.    Discharge goal is return home.        Swati Rodriguez MD         Sincerely,        Swati Rodriguez MD

## 2019-07-19 NOTE — PROGRESS NOTES
Carine Rodriguez is a 79 year old female [facility] July [facility], [facility] this month after [facility] stay 7/4-11 for atrial fibrillation with RVR.   She had acute hypoxic respiratory failure thought to be secondary to pneumonia vs biologic-induced pneumonitis vs underlying lung cancer with possible pembrolizumab toxicity. Pt follows at [place] for bilateral lung adnenocarcinoma, and saw her Oncologist there yesterday.   States they agreed to hold treatment at this point, and she is good with that decision.   Seen in her room, doing exercises with Occupational therapist.   Denies pain of any kind, reports her breathing is better and thrush resolving.         Previous history includes [facility] hospitalization 5/6-5/13, then [place] [facility] hospitalization 5/13-6/7 for acute respiratory failure with hypoxia, COPD exacerbation, left lung adenocarcinoma, atrial fibrillation and KINJAL.     Pt has a h/o left lung adenocarcinoma, s/p XRT in 11/2018, and on 4/30/19 received cycle 1 of palliative CTX with permetrexed, carboplatin and pembrolizumab.   She presented 5/6 with respiratory distress and was treated with BiPAP, nebs, steroid taper and abx.  Also found to have atrial fib with RVR.    However apixaban was held on 5/16 because of anemia and thrombocytopenia secondary to bone marrow suppression from her CTX.   Also had proximal DU with perforation.   She was treated with IV PPI and multiple transfusions.  She eventually stabilized and transferred to TCU for Rehab, then discharged home on 7/3   [facility] nurse referred pt back to the hospital on 7/4 for rapid atrial fib and dyspnea.   Thoracentesis improved her symptoms, with cytology + for atypical cells.     Apixaban remains on hold    Past Medical History:   Diagnosis Date     Cancer (H)  Adenocarcinoma of the lung Stage IA3, 2018      COPD (chronic obstructive pulmonary disease) (H)      Hypertension    KINJAL  Atrial  "fibrillation  Duodenal ulcer  HTN / CKD stage 3  Glaucoma    Past Surgical History:   Procedure Laterality Date     BIOPSY     Appendectomy   Partial nephrectomy for nephrolithiasis    SH:  Lives alone, house in [facility], with stairs to get in.  She has 3 daughters.   Smoker [id number]    FH: father  with colon cancer in his 60s, also had CHF and  at age 85.   Mother had a h/o cancer and HTN       Review Of Systems  Skin: negative   Eyes: impaired vision, glasses  Ears/Nose/Throat: hearing loss  Respiratory: as above    Cardiovascular: irregular heart beat, dyspnea on exertion and exercise intolerance  Gastrointestinal: negative  Genitourinary: negative  Musculoskeletal: min assist with ADLs, supervision for transfers, ambulates 222' with 4WW and SBA    Tinetti    4WW with a yellow tag.   TUG 37sec.     Neurologic: negative, SLUMS , safety questionnaire    Psychiatric: negative   Hematologic/Lymphatic/Immunologic: multiple allergies to medications, anemia   Endocrine: negative   Wt Readings from Last 5 Encounters:   19 78.7 kg (173 lb 9.6 oz)   07/15/19 78.9 kg (174 lb)   19 80.6 kg (177 lb 9.6 oz)   19 84.2 kg (185 lb 9.6 oz)   19 84.2 kg (185 lb 9.6 oz)    Was 207# in May         GENERAL APPEARANCE: alert and no distress  /65   Pulse 80   Temp 97  F (36.1  C)   Resp 19   Ht 1.651 m (5' 5\")   Wt 78.7 kg (173 lb 9.6 oz)   SpO2 94%   BMI 28.89 kg/m     HEENT: normocephalic, no lesion or abnormalities, tongue with erythema and some white patches, clearing   NECK: no adenopathy, no asymmetry, masses, or scars and thyroid normal to palpation  RESP: decreased BS, scattered wheezes and rhonchi  CV: irregular rate and rhythm, normal S1 S2, distant heart sounds  ABDOMEN:  soft, nontender, no HSM or masses and bowel sounds normal  MS: extremities normal, trace LE edema with chronic skin changes.   .  SKIN: no suspicious lesions or rashes  NEURO: Normal strength and " tone, sensory exam grossly normal, and speech normal  PSYCH: affect okay  LYMPHATICS: No cervical,  or supraclavicular nodes     Last Comprehensive Metabolic Panel:  Sodium   Date Value Ref Range Status   07/15/2019 140 136 - 145 mmol/L Final     Potassium   Date Value Ref Range Status   07/15/2019 4.2 3.5 - 5.0 mmol/L Final     Chloride   Date Value Ref Range Status   07/15/2019 98 98 - 107 mmol/L Final     Carbon Dioxide   Date Value Ref Range Status   07/15/2019 26 22 - 31 mmol/L Final     Anion Gap   Date Value Ref Range Status   07/15/2019 16 5 - 18 mmol/L Final     Glucose   Date Value Ref Range Status   07/15/2019 82 70 - 125 mg/dL Final     Urea Nitrogen   Date Value Ref Range Status   07/15/2019 25 8 - 28 mg/dL Final     Creatinine   Date Value Ref Range Status   07/15/2019 1.31 (H) 0.60 - 1.10 mg/dL Final     GFR Estimate   Date Value Ref Range Status   07/15/2019 39 (L) >60 ml/min/1.73m2 Final     Calcium   Date Value Ref Range Status   07/15/2019 10.7 (H) 8.5 - 10.5 mg/dL Final     Lab Results   Component Value Date    PROTTOTAL 6.7 05/09/2019      Lab Results   Component Value Date    WBC 17.4 07/15/2019      HGB 9.6 07/15/2019      MCV 98 07/15/2019       07/15/2019     ECHO 7/5/19    Heart Rate:     105  Heart Rhythm:     Regular  Summary:    * Prior study from 01/28/19 .    * The left ventricle is normal in size.    * There is severe concentric left ventricular hypertrophy.    * The estimated ejection fraction is 60-65%.    * Left ventricular segmental wall motion is normal.      IMP/PLAN:   (J96.00) Acute respiratory failure, unspecified whether with hypoxia or hypercapnia (H)   (J44.9) Chronic obstructive pulmonary disease, unspecified COPD type (H)  Comment: improved respiratory status, FEV1 0.5, +BD response  Plan: Combivent MDI, Spiriva, Pulmicort nebs, Brovana and prednisone taper    (C34.92) Adenocarcinoma of left lung (H)  Comment: s/p XRT and CTX; did not tolerate last cycle     Plan:  Saw her Oncologist at [place] yesterday, and they agreed to hold off on further treatment at this point.       (D64.9) Anemia, unspecified type  Comment: blood loss and bone marrow suppression, hgb still low but improved over last month.    Plan: follow hgb    (K26.9) Duodenal ulceration  Comment: with perforation   Plan: pantoprazole and sucralfate    (I48.91) Atrial fibrillation with RVR (H)  Comment:   Pulse Readings from Last 4 Encounters:   07/17/19 80   07/15/19 73   07/12/19 73   06/30/19 100      Plan: VR control with diltiazem and metoprolol  Apixaban is on hold secondary to GI bleed and anemia.   Resumption once she is more stable, determined by [facility].       (G47.33) KINJAL (obstructive sleep apnea)  Comment: on CPAP   Plan: with O2, home settings       (N18.3) Chronic renal insufficiency, stage III (moderate) (H)  Comment: GFR 39  Plan: renal dosing of medications and recheck BMP on furosemide    (I10) Essential hypertension  Comment:   BP Readings from Last 3 Encounters:   07/17/19 120/65   07/15/19 120/70   07/12/19 105/65      Plan: metoprolol, diltiazem at current doses    (B37.0) Oral thrush  Comment: oral cares  Plan: Nystatin swish and swallow     (R53.81) Physical deconditioning  Comment: after prolonged illness   Plan: PHYSICAL THERAPY / OCCUPATIONAL THERAPY for strengthening, endurance, ADLs.    Discharge goal is return home.        Swati Rodriguez MD

## 2019-07-22 ENCOUNTER — NURSING HOME VISIT (OUTPATIENT)
Dept: GERIATRICS | Facility: CLINIC | Age: 79
End: 2019-07-22
Payer: MEDICARE

## 2019-07-22 VITALS — SYSTOLIC BLOOD PRESSURE: 131 MMHG | HEART RATE: 68 BPM | DIASTOLIC BLOOD PRESSURE: 74 MMHG | OXYGEN SATURATION: 96 %

## 2019-07-22 DIAGNOSIS — J18.9 PNEUMONIA OF RIGHT UPPER LOBE DUE TO INFECTIOUS ORGANISM: Primary | ICD-10-CM

## 2019-07-22 DIAGNOSIS — J44.9 CHRONIC OBSTRUCTIVE PULMONARY DISEASE, UNSPECIFIED COPD TYPE (H): ICD-10-CM

## 2019-07-22 DIAGNOSIS — C34.91 BILATERAL LUNG CANCER (H): ICD-10-CM

## 2019-07-22 DIAGNOSIS — D64.9 NORMOCYTIC ANEMIA: ICD-10-CM

## 2019-07-22 DIAGNOSIS — R53.81 PHYSICAL DECONDITIONING: ICD-10-CM

## 2019-07-22 DIAGNOSIS — I10 ESSENTIAL HYPERTENSION: ICD-10-CM

## 2019-07-22 DIAGNOSIS — I48.91 ATRIAL FIBRILLATION WITH RVR (H): ICD-10-CM

## 2019-07-22 DIAGNOSIS — N18.3 ACUTE RENAL FAILURE SUPERIMPOSED ON STAGE 3 CHRONIC KIDNEY DISEASE, UNSPECIFIED ACUTE RENAL FAILURE TYPE: ICD-10-CM

## 2019-07-22 DIAGNOSIS — N17.9 ACUTE RENAL FAILURE SUPERIMPOSED ON STAGE 3 CHRONIC KIDNEY DISEASE, UNSPECIFIED ACUTE RENAL FAILURE TYPE: ICD-10-CM

## 2019-07-22 DIAGNOSIS — B37.0 ORAL THRUSH: ICD-10-CM

## 2019-07-22 DIAGNOSIS — C34.92 BILATERAL LUNG CANCER (H): ICD-10-CM

## 2019-07-22 PROCEDURE — 99309 SBSQ NF CARE MODERATE MDM 30: CPT | Performed by: NURSE PRACTITIONER

## 2019-07-22 NOTE — PROGRESS NOTES
[place] [facility]  [place] [facility] Record Number:  [id number]  Place of Service where encounter took place:  [facility] ([facility]) [[id number]]  Chief Complaint   Patient presents with     Nursing Home Acute       HPI:    Carine Rodriguez  is a 79 year old (1940), who is being seen today for an episodic care visit.  HPI information obtained from: facility chart records, facility staff, patient report and [facility] chart review. Today's concern is:     Pneumonia of right upper lobe due to infectious organism (H)  Bilateral lung cancer (H)  Normocytic anemia  Chronic obstructive pulmonary disease, unspecified COPD type (H)  Atrial fibrillation with RVR (H)  Acute renal failure superimposed on stage 3 chronic kidney disease, unspecified acute renal failure type (H)  Physical deconditioning  Oral thrush  Essential hypertension      Past Medical and Surgical History reviewed in Epic today.    MEDICATIONS:  Current Outpatient Medications   Medication Sig Dispense Refill     acetaminophen (TYLENOL) 325 MG tablet Take 650 mg by mouth every 6 hours as needed for mild pain       albuterol (PROAIR HFA/PROVENTIL HFA/VENTOLIN HFA) 108 (90 Base) MCG/ACT inhaler Inhale 2 puffs into the lungs every 4 hours as needed for shortness of breath / dyspnea or wheezing       arformoterol (BROVANA) 15 MCG/2ML NEBU neb solution Take 15 mcg by nebulization 2 times daily 1 vial inhale orally via nebulizer two times a day for Pneumonia of right upper lobe due to infectious organism related to CHRONIC OBSTRUCTIVE PULMONARY DISEASE, UNSPECIFIED (J44.9)       atorvastatin (LIPITOR) 80 MG tablet Take 40 mg by mouth every evening Dose decreased today       budesonide (PULMICORT) 0.5 MG/2ML neb solution Take 0.5 mg by nebulization 2 times daily 0.5 mg inhale orally two times a day for Pneumonia of right upper lobe due to infectious organism related to CHRONIC OBSTRUCTIVE PULMONARY DISEASE, UNSPECIFIED  (J44.9)       cetirizine (ZYRTEC) 10 MG tablet Take 10 mg by mouth daily       diltiazem ER COATED BEADS (DILTIAZEM CD) 300 MG 24 hr capsule Take 300 mg by mouth every morning       folic acid (FOLVITE) 400 MCG tablet Take 400 mcg by mouth daily       furosemide (LASIX) 40 MG tablet Take 40 mg by mouth every morning Discontinue this afternoon after dose given       hydrocortisone 1 % CREA cream Place rectally 2 times daily as needed for itching       ipratropium - albuterol 0.5 mg/2.5 mg/3 mL (DUONEB) 0.5-2.5 (3) MG/3ML neb solution Take 1 vial by nebulization 4 times daily And also taking every day prn for shortness of breath and wheezing       latanoprost (XALATAN) 0.005 % ophthalmic solution Place 1 drop into both eyes daily       metoprolol tartrate (LOPRESSOR) 100 MG tablet Take 100 mg by mouth 2 times daily        ondansetron (ZOFRAN) 8 MG tablet Take 1 tablet (8 mg) by mouth every 8 hours as needed for nausea       OTHER MEDICAL SUPPLIES GOOD oral care and tongue brushing tid and prn       OTHER MEDICAL SUPPLIES every shift Document O2 Sat and L/Min AND every evening shift every Mon for Oxygen Maintenance Change: Tubing, Mask &/or humidifier and date clean concentrator filter as applicable every Monday.       pantoprazole sodium (PROTONIX) 40 MG packet Take 1 packet by mouth 2 times daily       Polyethylene Glycol 1450 POWD 17 g daily as needed Give 17 gram by mouth as needed for constipation QD prn, for constipation, dissolve each 17 gm dose in 240mls (8oz) of beverage       potassium chloride ER (K-TAB) 20 MEQ CR tablet Take 20 mEq by mouth 2 times daily        PREDNISONE PO Give 40 mg by mouth one time a day for Pneumonia / COPD exacerbation for 3 Days AND Give 30 mg by mouth one time a day for Pneumonia / COPD exacerbation for 3 Days AND Give 20 mg by mouth one time a day for Pneumonia / COPD exacerbation for 3 Days AND Give 10 mg by mouth one time a day for Pneumonia / COPD exacerbation for 3 Days        senna-docusate (SENOKOT-S/PERICOLACE) 8.6-50 MG tablet Take 1 tablet by mouth 2 times daily as needed for constipation       sucralfate (CARAFATE) 1 GM tablet Take 1 g by mouth 4 times daily       tiotropium (SPIRIVA) 18 MCG inhaled capsule Inhale 18 mcg into the lungs daily 1 capsule inhale orally one time a day for prevent bronchospasm related to CHRONIC OBSTRUCTIVE PULMONARY DISEASE, UNSPECIFIED (J44.9)       apixaban ANTICOAGULANT (ELIQUIS) 5 MG tablet Take 5 mg by mouth 2 times daily **ON HOLD SINCE ~5/16/19 PER [place] NOTES* Apixaban on hold for at least one month. (Due to GI ulcer bleed) on HOLD since 5/16/19 per notes and will NOT restart until OK'd by [place] oncologist per NP       TODAY'S ROS:  No CP, SOB,  dizziness, fevers, chills, HA, N/V, dysuria or bowel issues.  Appetite is better. No pain.   Occas  Dry cough, mouth feels better from the thrush     Objective:  /74   Pulse 68   SpO2 96%    Exam:  GENERAL APPEARANCE:  Alert, in no distress, oriented, cooperative  ENT:  Mouth and posterior oropharynx normal except has oral thrush on tongue and back of throat. has moist mucous membranes, normal hearing acuity   RESP:  lungs clear to auscultation except some mild wheezes scattered on right side in base.  CV:  Auscultation of heart done , IRRR, no murmur, rub, or gallop,+1-2 bilat pedal pitting edema, +2 pedal pulses  ABDOMEN:  normal bowel sounds, soft, nontender.  M/S:   STEPHENS equally, up with assist in room has walker  SKIN:  Inspection of skin and subcutaneous tissue baseline  NEURO:   Cranial nerves 2-12 are normal tested and grossly at patient's baseline  PSYCH:  oriented X 3, normal insight, judgement and memory,        Labs:   Recent Labs   Lab Test 07/15/19 07/01/19    136   POTASSIUM 4.2 3.4*   CHLORIDE 98 102   CO2 26 22   ANIONGAP 16 12   GLC 82 96   BUN 25 20   CR 1.31* 1.62*   MELANIE 10.7* 9.1     CBC RESULTS:   Recent Labs   Lab Test 07/15/19   WBC 17.4*   RBC 3.41*   HGB 9.6*   HCT  33.3*   MCV 98   MCH 28.2   MCHC 28.8*   RDW 18.7*        **AT [facility](Lab/Diagnostic data:)**  7/5/19:  WBC 12.3, Hgb 9.3, Creat 2.24, lactic acid 3.0, CXR showed RUL consolidation.  7/8/19:  Hgb 9.0  7/11/19: Creat 1.15        ASSESSMENT / PLAN:   (J18.1) Pneumonia of right upper lobe due to infectious organism (H)  Comment/Plan: improving, will cont with same POC and inhalers and nebs, IS,  Monitor.    (C34.91,  C34.92) Bilateral lung cancer (H)  Comment/Plan:pt has appt at [place] in am ?palliative care and daughter will take her. Last appt with oncology was 7/17 and no new orders or immunotherapy at this time. She will f/u with them after the palliative care appt per their recs    (D64.9) Normocytic anemia  Comment/Plan: recheck Hgb on 7/25    (J44.9) Chronic obstructive pulmonary disease, unspecified COPD type (H)  Comment/Plan: cont current meds/nebs and O2 at night with the CPAP for her KINJAL.    (I48.91) Atrial fibrillation with RVR (H)  Comment/Plan: cont diltiazem and metoprolol is controlled, monitor. Cannot be on an DOAC due to recent GIB    (N17.9,  N18.3) Acute renal failure superimposed on stage 3 chronic kidney disease, unspecified acute renal failure type (H)  Comment/Plan: better and will recheck BMP on 7/25,   Wt is down(*177-178# down to 174.2#*) and some lower Bps so will decrease the lasix to once daily.    (R53.81) Physical deconditioning  Comment/Plan: getting a bit stronger, but still feels weak.  Cont PT OT    (I10) Essential hypertension  Comment/Plan: cont same meds-- diltiazem and metoprolol, monitor.  See under AF/ARF above.    (B37.0) Oral thrush  Comment/Plan: improving, cont with good oral care and nystatin swish and swallow. Monitor.    **I called and updated daughter Carlos Alberto in  with POC, med change and upcoming lab and that her mom feels better and stronger.**    Electronically signed by:  BLAIR Davis CNP

## 2019-07-24 ENCOUNTER — RECORDS - HEALTHEAST (OUTPATIENT)
Dept: LAB | Facility: CLINIC | Age: 79
End: 2019-07-24

## 2019-07-24 ASSESSMENT — MIFFLIN-ST. JEOR: SCORE: 1271.05

## 2019-07-24 NOTE — PROGRESS NOTES
[place] [facility]  [place] Medical Record Number:  [id number]  Place of Service where encounter took place:  [facility] ([facility]) [[id number]]  Chief Complaint   Patient presents with     Nursing Home Acute       HPI:    Carine Rodriguez  is a 79 year old (1940), who is being seen today for an episodic care visit.  HPI information obtained from: facility chart records, facility staff, patient report and [facility] chart review. Today's concern is: pt saw Palliative Care at [facility] on 7/23--no change in decisions at this time     Bilateral lung cancer (H)  Chronic obstructive pulmonary disease, unspecified COPD type (H)  Physical deconditioning  Normocytic anemia  Oral thrush  Essential hypertension  Atrial fibrillation with RVR (H)  CKD (chronic kidney disease) stage 3, GFR 30-59 ml/min (H)      Past Medical and Surgical History reviewed in Epic today.    MEDICATIONS:  Current Outpatient Medications   Medication Sig Dispense Refill     acetaminophen (TYLENOL) 325 MG tablet Take 650 mg by mouth every 6 hours as needed for mild pain       albuterol (PROAIR HFA/PROVENTIL HFA/VENTOLIN HFA) 108 (90 Base) MCG/ACT inhaler Inhale 2 puffs into the lungs every 4 hours as needed for shortness of breath / dyspnea or wheezing       arformoterol (BROVANA) 15 MCG/2ML NEBU neb solution Take 15 mcg by nebulization 2 times daily 1 vial inhale orally via nebulizer two times a day for Pneumonia of right upper lobe due to infectious organism related to CHRONIC OBSTRUCTIVE PULMONARY DISEASE, UNSPECIFIED (J44.9)       atorvastatin (LIPITOR) 80 MG tablet Take 40 mg by mouth every evening Dose decreased today       budesonide (PULMICORT) 0.5 MG/2ML neb solution Take 0.5 mg by nebulization 2 times daily 0.5 mg inhale orally two times a day for Pneumonia of right upper lobe due to infectious organism related to CHRONIC OBSTRUCTIVE PULMONARY DISEASE, UNSPECIFIED (J44.9)       cetirizine (ZYRTEC) 10 MG  tablet Take 10 mg by mouth daily       diltiazem ER COATED BEADS (DILTIAZEM CD) 300 MG 24 hr capsule Take 300 mg by mouth every morning       folic acid (FOLVITE) 400 MCG tablet Take 400 mcg by mouth daily       hydrocortisone 1 % CREA cream Place rectally 2 times daily as needed for itching       ipratropium - albuterol 0.5 mg/2.5 mg/3 mL (DUONEB) 0.5-2.5 (3) MG/3ML neb solution Take 1 vial by nebulization 4 times daily And also taking every day prn for shortness of breath and wheezing       latanoprost (XALATAN) 0.005 % ophthalmic solution Place 1 drop into both eyes daily       metoprolol tartrate (LOPRESSOR) 100 MG tablet Take 100 mg by mouth 2 times daily        ondansetron (ZOFRAN) 8 MG tablet Take 1 tablet (8 mg) by mouth every 8 hours as needed for nausea       OTHER MEDICAL SUPPLIES GOOD oral care and tongue brushing tid and prn       OTHER MEDICAL SUPPLIES every shift Document O2 Sat and L/Min AND every evening shift every Mon for Oxygen Maintenance Change: Tubing, Mask &/or humidifier and date clean concentrator filter as applicable every Monday.       pantoprazole sodium (PROTONIX) 40 MG packet Take 1 packet by mouth 2 times daily       Polyethylene Glycol 1450 POWD 17 g daily as needed Give 17 gram by mouth as needed for constipation QD prn, for constipation, dissolve each 17 gm dose in 240mls (8oz) of beverage       Potassium Chloride (KCL-20 OR) Take 40 mEq by mouth once Give 40 meq today 7/25/19.       potassium chloride ER (K-TAB) 20 MEQ CR tablet Take 20 mEq by mouth 2 times daily        senna-docusate (SENOKOT-S/PERICOLACE) 8.6-50 MG tablet Take 1 tablet by mouth 2 times daily as needed for constipation       sucralfate (CARAFATE) 1 GM tablet Take 1 g by mouth 4 times daily       tiotropium (SPIRIVA) 18 MCG inhaled capsule Inhale 18 mcg into the lungs daily 1 capsule inhale orally one time a day for prevent bronchospasm related to CHRONIC OBSTRUCTIVE PULMONARY DISEASE, UNSPECIFIED (J44.9)        "apixaban ANTICOAGULANT (ELIQUIS) 5 MG tablet Take 5 mg by mouth 2 times daily **ON HOLD SINCE ~5/16/19 PER [place] NOTES* Apixaban on hold for at least one month. (Due to GI ulcer bleed) on HOLD since 5/16/19 per notes and will NOT restart until OK'd by [place] oncologist per NP       nystatin (MYCOSTATIN) 196287 unit/mL SUSP suspension Swish and swallow 50,000 Units in mouth 4 times daily For recurrent Oral thrush-- no stop date at this time.  Review prn.       TODAY'S ROS:  No CP, SOB,  dizziness, fevers, chills, HA, N/V, dysuria or bowel issues.  Appetite is variable No pain.   Occas dry cough, wonder if thrush back.     Objective:  /69   Pulse 100   Temp 97.5  F (36.4  C)   Resp 19   Ht 1.651 m (5' 5\")   Wt 79 kg (174 lb 3.2 oz)   SpO2 95%   BMI 28.99 kg/m     Exam:  GENERAL APPEARANCE:  Alert, in no distress, oriented, cooperative  ENT:  Mouth with normal oral mucosa except has oral thrush on sides of tongue.   RESP:  lungs clear to auscultation except scattered coarse BS and wheezes  CV:  Auscultation of heart done , IRRR, no murmur, rub, or gallop,no edema this am, +2 pedal pulses  ABDOMEN:  normal bowel sounds, soft, nontender.  M/S:   STEPHENS equally, up with assist in room has walker  SKIN:  Inspection of skin and subcutaneous tissue baseline  NEURO:   Cranial nerves 2-12 are grossly intact and at patient's baseline  PSYCH:  oriented X 3, normal insight, judgement and memory,        Labs:   Recent Labs   Lab Test 07/15/19 07/01/19    136   POTASSIUM 4.2 3.4*   CHLORIDE 98 102   CO2 26 22   ANIONGAP 16 12   GLC 82 96   BUN 25 20   CR 1.31* 1.62*   MELANIE 10.7* 9.1     CBC RESULTS:   Recent Labs   Lab Test 07/15/19   WBC 17.4*   RBC 3.41*   HGB 9.6*   HCT 33.3*   MCV 98   MCH 28.2   MCHC 28.8*   RDW 18.7*        **AT [facility](Lab/Diagnostic data:)**  7/5/19:  WBC 12.3, Hgb 9.3, Creat 2.24, lactic acid 3.0, CXR showed RUL consolidation.  7/8/19:  Hgb 9.0  7/11/19: Creat 1.15     " "  ASSESSMENT / PLAN:    (C34.91,  C34.92) Bilateral lung cancer (H)  Comment/Plan:pt had appt at [facility] 7/23/Palliative care--no change in care.  Last appt with oncology was 7/17--will send labs from today there and to PCP in [facility].  She will f/u with them after the palliative care appt per their recs. *they rec'd she go to oncology closer to home in future*.    (J44.9) Chronic obstructive pulmonary disease, unspecified COPD type (H)  Comment/Plan: cont current meds/nebs and O2 at night with the CPAP for her KINJAL--awaiting new parts as mask \"rubs' on her nose and bothers her.    (R53.81) Physical deconditioning  Comment/Plan: getting a bit stronger,-no LCD  Cont PT OT    (D64.9) Normocytic anemia  Comment/Plan: Hgb lower, check on 7/29    (B37.0) Oral thrush  Comment/Plan: was improving, now more evident, will resume th Nystatin and have no stop date for now.  Will cont with good oral care. Monitor.    (I48.91) Atrial fibrillation with RVR (H)  (I10) Essential hypertension  Comment/Plan:  HR mostly in 60-80's (>100 in the past couple days). The SBP running 100-120's mostly,  cont diltiazem and metoprolol monitor. Cannot be on an DOAC due to recent GIB    (N18.3) CKD (chronic kidney disease) stage 3, GFR 30-59 ml/min (H)  (E87.6) Hypokalemia  Comment/Plan: a bit worse function. Last lasix dose was 7/22, K is low despite on KCL bid. Will give additional 40 meq today x1.  Check BMP on 7/29       Electronically signed by:  BLAIR Davis CNP             "

## 2019-07-25 ENCOUNTER — TRANSFERRED RECORDS (OUTPATIENT)
Dept: HEALTH INFORMATION MANAGEMENT | Facility: CLINIC | Age: 79
End: 2019-07-25

## 2019-07-25 ENCOUNTER — NURSING HOME VISIT (OUTPATIENT)
Dept: GERIATRICS | Facility: CLINIC | Age: 79
End: 2019-07-25
Payer: MEDICARE

## 2019-07-25 VITALS
TEMPERATURE: 97.5 F | RESPIRATION RATE: 19 BRPM | WEIGHT: 174.2 LBS | HEIGHT: 65 IN | OXYGEN SATURATION: 95 % | SYSTOLIC BLOOD PRESSURE: 112 MMHG | DIASTOLIC BLOOD PRESSURE: 69 MMHG | BODY MASS INDEX: 29.02 KG/M2 | HEART RATE: 100 BPM

## 2019-07-25 DIAGNOSIS — C34.91 BILATERAL LUNG CANCER (H): Primary | ICD-10-CM

## 2019-07-25 DIAGNOSIS — D64.9 NORMOCYTIC ANEMIA: ICD-10-CM

## 2019-07-25 DIAGNOSIS — E87.6 HYPOKALEMIA: ICD-10-CM

## 2019-07-25 DIAGNOSIS — I10 ESSENTIAL HYPERTENSION: ICD-10-CM

## 2019-07-25 DIAGNOSIS — B37.0 ORAL THRUSH: ICD-10-CM

## 2019-07-25 DIAGNOSIS — R53.81 PHYSICAL DECONDITIONING: ICD-10-CM

## 2019-07-25 DIAGNOSIS — N18.30 CKD (CHRONIC KIDNEY DISEASE) STAGE 3, GFR 30-59 ML/MIN (H): ICD-10-CM

## 2019-07-25 DIAGNOSIS — C34.92 BILATERAL LUNG CANCER (H): Primary | ICD-10-CM

## 2019-07-25 DIAGNOSIS — J44.9 CHRONIC OBSTRUCTIVE PULMONARY DISEASE, UNSPECIFIED COPD TYPE (H): ICD-10-CM

## 2019-07-25 DIAGNOSIS — I48.91 ATRIAL FIBRILLATION WITH RVR (H): ICD-10-CM

## 2019-07-25 LAB
ANION GAP SERPL CALCULATED.3IONS-SCNC: 11 MMOL/L (ref 5–18)
ANION GAP SERPL CALCULATED.3IONS-SCNC: 11 MMOL/L (ref 5–18)
BUN SERPL-MCNC: 16 MG/DL (ref 8–28)
BUN SERPL-MCNC: 16 MG/DL (ref 8–28)
CALCIUM SERPL-MCNC: 9.6 MG/DL (ref 8.5–10.5)
CALCIUM SERPL-MCNC: 9.6 MG/DL (ref 8.5–10.5)
CHLORIDE BLD-SCNC: 100 MMOL/L (ref 98–107)
CHLORIDE SERPLBLD-SCNC: 100 MMOL/L (ref 98–107)
CO2 SERPL-SCNC: 25 MMOL/L (ref 22–31)
CO2 SERPL-SCNC: 25 MMOL/L (ref 22–31)
CREAT SERPL-MCNC: 1.14 MG/DL (ref 0.6–1.1)
CREAT SERPL-MCNC: 1.14 MG/DL (ref 0.6–1.1)
GFR SERPL CREATININE-BSD FRML MDRD: 46 ML/MIN/1.73M2
GFR SERPL CREATININE-BSD FRML MDRD: 46 ML/MIN/1.73M2
GLUCOSE BLD-MCNC: 84 MG/DL (ref 70–125)
GLUCOSE SERPL-MCNC: 84 MG/DL (ref 70–125)
HEMOGLOBIN: 7.5 G/DL (ref 12–16)
HGB BLD-MCNC: 7.5 G/DL (ref 12–16)
POTASSIUM BLD-SCNC: 3.1 MMOL/L (ref 3.5–5)
POTASSIUM SERPL-SCNC: 3.1 MMOL/L (ref 3.5–5)
SODIUM SERPL-SCNC: 136 MMOL/L (ref 136–145)
SODIUM SERPL-SCNC: 136 MMOL/L (ref 136–145)
WBC # BLD AUTO: 5 10^9/L (ref 4–11)
WBC: 5 THOU/UL (ref 4–11)

## 2019-07-25 PROCEDURE — 99309 SBSQ NF CARE MODERATE MDM 30: CPT | Performed by: NURSE PRACTITIONER

## 2019-07-27 ENCOUNTER — RECORDS - HEALTHEAST (OUTPATIENT)
Dept: LAB | Facility: CLINIC | Age: 79
End: 2019-07-27

## 2019-07-27 ENCOUNTER — TELEPHONE (OUTPATIENT)
Dept: GERIATRICS | Facility: CLINIC | Age: 79
End: 2019-07-27

## 2019-07-27 NOTE — TELEPHONE ENCOUNTER
Patient with dyspnea and hypotension. Has lung cancer, treated for pneumonia. Feels SOB today. No fevers, /68. RN held AM BP meds    PLAN  O2 1-3 lpm per NC for dyspnea or low sats.  Offer PRN DuoNeb.  Update if no improvement    Electronically signed by BLAIR Mock, GNP

## 2019-07-28 ASSESSMENT — MIFFLIN-ST. JEOR: SCORE: 1265.6

## 2019-07-29 ENCOUNTER — TRANSFERRED RECORDS (OUTPATIENT)
Dept: HEALTH INFORMATION MANAGEMENT | Facility: CLINIC | Age: 79
End: 2019-07-29

## 2019-07-29 ENCOUNTER — NURSING HOME VISIT (OUTPATIENT)
Dept: GERIATRICS | Facility: CLINIC | Age: 79
End: 2019-07-29
Payer: MEDICARE

## 2019-07-29 VITALS
DIASTOLIC BLOOD PRESSURE: 68 MMHG | HEIGHT: 65 IN | WEIGHT: 173 LBS | BODY MASS INDEX: 28.82 KG/M2 | HEART RATE: 98 BPM | SYSTOLIC BLOOD PRESSURE: 115 MMHG | TEMPERATURE: 97 F | OXYGEN SATURATION: 93 % | RESPIRATION RATE: 19 BRPM

## 2019-07-29 DIAGNOSIS — C34.91 BILATERAL LUNG CANCER (H): Primary | ICD-10-CM

## 2019-07-29 DIAGNOSIS — R53.81 PHYSICAL DECONDITIONING: ICD-10-CM

## 2019-07-29 DIAGNOSIS — J44.9 CHRONIC OBSTRUCTIVE PULMONARY DISEASE, UNSPECIFIED COPD TYPE (H): ICD-10-CM

## 2019-07-29 DIAGNOSIS — R45.89 ANXIETY ABOUT HEALTH: ICD-10-CM

## 2019-07-29 DIAGNOSIS — I10 ESSENTIAL HYPERTENSION: ICD-10-CM

## 2019-07-29 DIAGNOSIS — N18.30 CKD (CHRONIC KIDNEY DISEASE) STAGE 3, GFR 30-59 ML/MIN (H): ICD-10-CM

## 2019-07-29 DIAGNOSIS — I48.91 ATRIAL FIBRILLATION WITH RVR (H): ICD-10-CM

## 2019-07-29 DIAGNOSIS — B37.0 ORAL THRUSH: ICD-10-CM

## 2019-07-29 DIAGNOSIS — D64.9 NORMOCYTIC ANEMIA: ICD-10-CM

## 2019-07-29 DIAGNOSIS — C34.92 BILATERAL LUNG CANCER (H): Primary | ICD-10-CM

## 2019-07-29 LAB
ANION GAP SERPL CALCULATED.3IONS-SCNC: 9 MMOL/L (ref 5–18)
ANION GAP SERPL CALCULATED.3IONS-SCNC: 9 MMOL/L (ref 5–18)
BUN SERPL-MCNC: 18 MG/DL (ref 8–28)
BUN SERPL-MCNC: 18 MG/DL (ref 8–28)
CALCIUM SERPL-MCNC: 9.4 MG/DL (ref 8.5–10.5)
CALCIUM SERPL-MCNC: 9.4 MG/DL (ref 8.5–10.5)
CHLORIDE BLD-SCNC: 100 MMOL/L (ref 98–107)
CHLORIDE SERPLBLD-SCNC: 100 MMOL/L (ref 98–107)
CO2 SERPL-SCNC: 25 MMOL/L (ref 22–31)
CO2 SERPL-SCNC: 25 MMOL/L (ref 22–31)
CREAT SERPL-MCNC: 1.3 MG/DL (ref 0.6–1.1)
CREAT SERPL-MCNC: 1.3 MG/DL (ref 0.6–1.1)
GFR SERPL CREATININE-BSD FRML MDRD: 40 ML/MIN/1.73M2
GFR SERPL CREATININE-BSD FRML MDRD: 40 ML/MIN/1.73M2
GLUCOSE BLD-MCNC: 97 MG/DL (ref 70–125)
GLUCOSE SERPL-MCNC: 97 MG/DL (ref 70–125)
HEMOGLOBIN: 7.4 G/DL (ref 12–16)
HGB BLD-MCNC: 7.4 G/DL (ref 12–16)
POTASSIUM BLD-SCNC: 3.6 MMOL/L (ref 3.5–5)
POTASSIUM SERPL-SCNC: 3.6 MMOL/L (ref 3.5–5)
SODIUM SERPL-SCNC: 134 MMOL/L (ref 136–145)
SODIUM SERPL-SCNC: 134 MMOL/L (ref 136–145)

## 2019-07-29 PROCEDURE — 99310 SBSQ NF CARE HIGH MDM 45: CPT | Performed by: NURSE PRACTITIONER

## 2019-07-29 RX ORDER — LORAZEPAM 0.5 MG/1
0.25 TABLET ORAL EVERY 12 HOURS PRN
COMMUNITY
Start: 2019-07-29 | End: 2019-07-31

## 2019-07-29 RX ORDER — FUROSEMIDE 40 MG
40 TABLET ORAL EVERY MORNING
COMMUNITY

## 2019-07-29 NOTE — PROGRESS NOTES
[place] [facility]  [place] Medical Record Number:  [id number]  Place of Service where encounter took place:  [facility] ([facility]) [[id number]]  Chief Complaint   Patient presents with     Nursing Home Acute       HPI:    Carine Rodriguez  is a 79 year old (1940), who is being seen today for an episodic care visit.  HPI information obtained from: facility chart records, facility staff, patient report and [facility] chart review. Today's concern is: states says feels weak, not sleeping well, feels is not improving and wants to go home soon.     Bilateral lung cancer (H)  Chronic obstructive pulmonary disease, unspecified COPD type (H)  Normocytic anemia  Oral thrush  Essential hypertension  Atrial fibrillation with RVR (H)  CKD (chronic kidney disease) stage 3, GFR 30-59 ml/min (H)  Anxiety about health  Physical deconditioning      Past Medical and Surgical History reviewed in Epic today.    MEDICATIONS:  Current Outpatient Medications   Medication Sig Dispense Refill     acetaminophen (TYLENOL) 325 MG tablet Take 650 mg by mouth every 6 hours as needed for mild pain       albuterol (PROAIR HFA/PROVENTIL HFA/VENTOLIN HFA) 108 (90 Base) MCG/ACT inhaler Inhale 2 puffs into the lungs every 4 hours as needed for shortness of breath / dyspnea or wheezing       arformoterol (BROVANA) 15 MCG/2ML NEBU neb solution Take 15 mcg by nebulization 2 times daily 1 vial inhale orally via nebulizer two times a day for Pneumonia of right upper lobe due to infectious organism related to CHRONIC OBSTRUCTIVE PULMONARY DISEASE, UNSPECIFIED (J44.9)       atorvastatin (LIPITOR) 80 MG tablet Take 40 mg by mouth every evening Dose decreased today       budesonide (PULMICORT) 0.5 MG/2ML neb solution Take 0.5 mg by nebulization 2 times daily 0.5 mg inhale orally two times a day for Pneumonia of right upper lobe due to infectious organism related to CHRONIC OBSTRUCTIVE PULMONARY DISEASE, UNSPECIFIED  (J44.9)       cetirizine (ZYRTEC) 10 MG tablet Take 10 mg by mouth daily       diltiazem ER COATED BEADS (DILTIAZEM CD) 300 MG 24 hr capsule Take 300 mg by mouth every morning       folic acid (FOLVITE) 400 MCG tablet Take 400 mcg by mouth daily       furosemide (LASIX) 40 MG tablet Take 40 mg by mouth every morning       hydrocortisone 1 % CREA cream Place rectally 2 times daily as needed for itching       ipratropium - albuterol 0.5 mg/2.5 mg/3 mL (DUONEB) 0.5-2.5 (3) MG/3ML neb solution Take 1 vial by nebulization 4 times daily And also taking every day prn for shortness of breath and wheezing       latanoprost (XALATAN) 0.005 % ophthalmic solution Place 1 drop into both eyes daily       LORazepam (ATIVAN) 0.25 mg TABS half-tab Take 0.25 mg by mouth every 12 hours as needed for anxiety       metoprolol tartrate (LOPRESSOR) 100 MG tablet Take 100 mg by mouth 2 times daily        nystatin (MYCOSTATIN) 549241 unit/mL SUSP suspension Swish and swallow 50,000 Units in mouth 4 times daily For recurrent Oral thrush-- no stop date at this time.  Review prn.       ondansetron (ZOFRAN) 8 MG tablet Take 1 tablet (8 mg) by mouth every 8 hours as needed for nausea       OTHER MEDICAL SUPPLIES GOOD oral care and tongue brushing tid and prn       OTHER MEDICAL SUPPLIES every shift Document O2 Sat and L/Min AND every evening shift every Mon for Oxygen Maintenance Change: Tubing, Mask &/or humidifier and date clean concentrator filter as applicable every Monday.       pantoprazole sodium (PROTONIX) 40 MG packet Take 1 packet by mouth 2 times daily       Polyethylene Glycol 1450 POWD 17 g daily as needed Give 17 gram by mouth as needed for constipation QD prn, for constipation, dissolve each 17 gm dose in 240mls (8oz) of beverage       potassium chloride ER (K-TAB) 20 MEQ CR tablet Take 20 mEq by mouth 2 times daily        senna-docusate (SENOKOT-S/PERICOLACE) 8.6-50 MG tablet Take 1 tablet by mouth 2 times daily as needed for  "constipation       sucralfate (CARAFATE) 1 GM tablet Take 1 g by mouth 4 times daily       tiotropium (SPIRIVA) 18 MCG inhaled capsule Inhale 18 mcg into the lungs daily 1 capsule inhale orally one time a day for prevent bronchospasm related to CHRONIC OBSTRUCTIVE PULMONARY DISEASE, UNSPECIFIED (J44.9)       apixaban ANTICOAGULANT (ELIQUIS) 5 MG tablet Take 5 mg by mouth 2 times daily **ON HOLD SINCE ~5/16/19 PER [place] NOTES* Apixaban on hold for at least one month. (Due to GI ulcer bleed) on HOLD since 5/16/19 per notes and will NOT restart until OK'd by [place] oncologist per NP       TODAY'S ROS:  No CP,  dizziness, fevers, chills, HA, N/V, dysuria or bowel issues.  Appetite is variable.  No pain.   Occas dry cough, occas SOB.    \"I just feel really weak\"     Objective:  /68   Pulse 98   Temp 97  F (36.1  C)   Resp 19   Ht 1.651 m (5' 5\")   Wt 78.5 kg (173 lb)   SpO2 93%   BMI 28.79 kg/m     Exam:  GENERAL APPEARANCE:  Alert, in no distress, oriented, cooperative  ENT:  Mouth with normal oral mucosa except has oral thrush on tongue.   RESP:  lungs clear to auscultation except rare scattered BS, no wheezes.  CV:  Auscultation of heart done , IRRR, no murmur, rub, or gallop, trace pedal edema, +2 pedal pulses  ABDOMEN:  normal bowel sounds, soft, nontender.  M/S:   STEPHENS equally, up with assist in room has walker  SKIN:  Inspection of skin and subcutaneous tissue baseline  NEURO:   Cranial nerves 2-12 are grossly intact and at patient's baseline  PSYCH:  oriented X 3, normal insight, judgement and memory,        Labs:   Recent Labs   Lab Test 07/29/19 07/25/19   * 136   POTASSIUM 3.6 3.1*   CHLORIDE 100 100   CO2 25 25   ANIONGAP 9 11   GLC 97 84   BUN 18 16   CR 1.30* 1.14*   MELANIE 9.4 9.6       Recent Labs   Lab Test 07/15/19 07/01/19    136   POTASSIUM 4.2 3.4*   CHLORIDE 98 102   CO2 26 22   ANIONGAP 16 12   GLC 82 96   BUN 25 20   CR 1.31* 1.62*   MELANIE 10.7* 9.1   CBC RESULTS:   Recent Labs "   Lab Test 07/29/19 07/25/19 07/15/19   WBC  --  5.0 17.4*   RBC  --   --  3.41*   HGB 7.4* 7.5* 9.6*   HCT  --   --  33.3*   MCV  --   --  98   MCH  --   --  28.2   MCHC  --   --  28.8*   RDW  --   --  18.7*   PLT  --   --  210               ASSESSMENT / PLAN:    (C34.91,  C34.92) Bilateral lung cancer (H)  Comment/Plan:pt had appt at [place] 7/23/Palliative care and no other appts at this time.--see below    (J44.9) Chronic obstructive pulmonary disease, unspecified COPD type (H)  Comment/Plan: cont current meds/nebs and O2 at night with CPAP for her KINJAL- her daughter got the  new parts so it is functioning well now,    (D64.9) Normocytic anemia  Comment/Plan: Hgb  Steady--7.4 today, was 7.5, no plan for transfusion at this time.    (B37.0) Oral thrush  Comment/Plan: slightly improving, cont the Nystatin with no stop date for now.  Will cont with good oral care. Monitor.    (I48.91) Atrial fibrillation with RVR (H)  (I10) Essential hypertension  Comment/Plan:  HR mostly in 60-80's.  The SBP running 100-120's mostly,  cont diltiazem and metoprolol monitor. Cannot be on an DOAC due to recent GIB    (N18.3) CKD (chronic kidney disease) stage 3, GFR 30-59 ml/min (H)  Comment/Plan: a bit worse function today again. Last lasix dose was 7/22, K was fine.    (F41.8) Anxiety about health  Comment/Plan: will order prn ativan for anxiety and sleep    (R53.81) Physical deconditioning  Comment/Plan: getting a bit stronger,-no LCD  Cont PT OT    Total time with patient visit: 55 minutes including discussions about the POC and care coordination with the patient and family, daughter Carlos Alberto and ex . Greater than 50% of total time spent with counseling and coordinating care due to complexities of care.  We discussed options of further appts and treatment including transfusions at [place]; going to a [facility] TCU to be closer to home; connecting with per PCP and a local oncologist there in the [facility]; and hospice.    They chose to d/w  here also and then decided to pursue Hospice at Home.   Her PCP clinic will work with us to initiate this prior to discharge later this week.      Electronically signed by:  BLAIR Davis CNP

## 2019-07-31 ENCOUNTER — DISCHARGE SUMMARY NURSING HOME (OUTPATIENT)
Dept: GERIATRICS | Facility: CLINIC | Age: 79
End: 2019-07-31
Payer: MEDICARE

## 2019-07-31 VITALS
OXYGEN SATURATION: 93 % | DIASTOLIC BLOOD PRESSURE: 60 MMHG | HEART RATE: 105 BPM | RESPIRATION RATE: 18 BRPM | SYSTOLIC BLOOD PRESSURE: 110 MMHG

## 2019-07-31 DIAGNOSIS — D64.9 NORMOCYTIC ANEMIA: ICD-10-CM

## 2019-07-31 DIAGNOSIS — I48.91 ATRIAL FIBRILLATION WITH RVR (H): ICD-10-CM

## 2019-07-31 DIAGNOSIS — C34.92 BILATERAL LUNG CANCER (H): Primary | ICD-10-CM

## 2019-07-31 DIAGNOSIS — N18.30 CKD (CHRONIC KIDNEY DISEASE) STAGE 3, GFR 30-59 ML/MIN (H): ICD-10-CM

## 2019-07-31 DIAGNOSIS — C34.91 BILATERAL LUNG CANCER (H): Primary | ICD-10-CM

## 2019-07-31 DIAGNOSIS — J44.9 CHRONIC OBSTRUCTIVE PULMONARY DISEASE, UNSPECIFIED COPD TYPE (H): ICD-10-CM

## 2019-07-31 DIAGNOSIS — I10 ESSENTIAL HYPERTENSION: ICD-10-CM

## 2019-07-31 DIAGNOSIS — B37.0 ORAL THRUSH: ICD-10-CM

## 2019-07-31 DIAGNOSIS — Z51.5 HOSPICE CARE PATIENT: ICD-10-CM

## 2019-07-31 DIAGNOSIS — R53.81 PHYSICAL DECONDITIONING: ICD-10-CM

## 2019-07-31 PROCEDURE — 99316 NF DSCHRG MGMT 30 MIN+: CPT | Mod: GV | Performed by: NURSE PRACTITIONER

## 2019-07-31 RX ORDER — LORAZEPAM 2 MG/ML
0.5 CONCENTRATE ORAL EVERY 4 HOURS PRN
COMMUNITY

## 2019-07-31 RX ORDER — PREDNISONE 50 MG/1
50 TABLET ORAL DAILY
COMMUNITY
Start: 2019-07-30 | End: 2019-08-04

## 2019-07-31 RX ORDER — HYOSCYAMINE SULFATE 0.125 MG
0.12 TABLET ORAL EVERY 4 HOURS PRN
COMMUNITY

## 2019-07-31 RX ORDER — MORPHINE SULFATE 20 MG/5ML
5 SOLUTION ORAL EVERY 4 HOURS PRN
COMMUNITY

## 2019-07-31 NOTE — PROGRESS NOTES
[facility] DISCHARGE SUMMARY    PATIENT'S NAME: Carine Rodriguez  YOB: 1940    PRIMARY CARE PROVIDER AND CLINIC RESPONSIBLE AFTER TRANSFER: Collette Meza     CODE STATUS: DNR/DNI    TRANSFERRING PROVIDERS: Paula Olivia, BLAIR TURCIOS, Dr. Swati Rodriguez,      DATE OF SNF ADMISSION:  July / 11 / 2019    DATE OF SNF DISCHARGE (including anticipating DC): July / 31 / 2019    DISCHARGE DISPOSITION: [facility] Provider    Nursing Facility: [facility]   Hospital  [facility] stay 7/4/19 to 7/11/19.     Condition on Discharge:  Declining.    Function:  Ambulates: 175 ft w/ fww  Transfers: s  Cognitive Scores: SLUMS TBA and Meds:6./7    Physical Function: [place] Balance Assessment: 19/28  Equipment: walker  DME: Walker    DISCHARGE DIAGNOSIS:      Bilateral lung cancer (H)  Chronic obstructive pulmonary disease, unspecified COPD type (H)  Normocytic anemia  Oral thrush  Essential hypertension  Atrial fibrillation with RVR (H)  CKD (chronic kidney disease) stage 3, GFR 30-59 ml/min (H)  Physical deconditioning  Hospice care patient        HOSPITAL COURSE:  pt was home for one day and got  SOB, chills, hypoxia, went to ER in [facility], found to have  RUL pneumonia, dehydrated, rapid AF.  She was rehydrated, and kidney function improved, CCB was increased with better rate control.  hypokalemia corrected. Put on Abs of Ceftriaxone and Doxycycline.  Echo done and EF was 0-65%.   CT of chest on 7/5 showed multiple enlarging and new pulmonary nodules and mediastinal adenopathy.  She also had a thoracentesis and breathing improved after that.  The atypical cells were suspicious for malignancy.  Her DOAC remains on hold due to her recent duodenal ulcer/GIB.     TCU/SNF COURSE: she initially got stronger but has been declining the past week.  She had an appt at [facility] with oncology and they rec'd she see a local oncologist in [facility].  She also saw Palliative care there  on 7/23.  On 7/29 she and family elected to start Hospice, as she does not want to be hospitalized further and also wants to go home.  [facility] has seen pt here to initiate and enroll.      PAST MEDICAL HISTORY:  Past Medical History:   Diagnosis Date     Cancer (H)      COPD (chronic obstructive pulmonary disease) (H)      Hypertension        DISCHARGE MEDICATIONS:  Current Outpatient Medications   Medication Sig Dispense Refill     acetaminophen (TYLENOL) 325 MG tablet Take 650 mg by mouth every 6 hours as needed for mild pain       albuterol (PROAIR HFA/PROVENTIL HFA/VENTOLIN HFA) 108 (90 Base) MCG/ACT inhaler Inhale 2 puffs into the lungs every 4 hours as needed for shortness of breath / dyspnea or wheezing       arformoterol (BROVANA) 15 MCG/2ML NEBU neb solution Take 15 mcg by nebulization 2 times daily 1 vial inhale orally via nebulizer two times a day for Pneumonia of right upper lobe due to infectious organism related to CHRONIC OBSTRUCTIVE PULMONARY DISEASE, UNSPECIFIED (J44.9)       budesonide (PULMICORT) 0.5 MG/2ML neb solution Take 0.5 mg by nebulization 2 times daily 0.5 mg inhale orally two times a day for Pneumonia of right upper lobe due to infectious organism related to CHRONIC OBSTRUCTIVE PULMONARY DISEASE, UNSPECIFIED (J44.9)       cetirizine (ZYRTEC) 10 MG tablet Take 10 mg by mouth daily       diltiazem ER COATED BEADS (DILTIAZEM CD) 300 MG 24 hr capsule Take 300 mg by mouth every morning Hold if SBP <110 or HR <55 and notify provider       furosemide (LASIX) 40 MG tablet Take 40 mg by mouth every morning       hydrocortisone 1 % CREA cream Place rectally 2 times daily as needed for itching       hyoscyamine (ANASPAZ/LEVSIN) 0.125 MG tablet Take 0.125 mg by mouth every 4 hours as needed for cramping       ipratropium - albuterol 0.5 mg/2.5 mg/3 mL (DUONEB) 0.5-2.5 (3) MG/3ML neb solution Take 1 vial by nebulization 4 times daily And also taking every day prn for shortness of breath and  wheezing       latanoprost (XALATAN) 0.005 % ophthalmic solution Place 1 drop into both eyes daily       LORazepam (ATIVAN) 2 MG/ML (HIGH CONC) solution Take 0.5 mg by mouth every 4 hours as needed for anxiety       metoprolol tartrate (LOPRESSOR) 100 MG tablet Take 100 mg by mouth 2 times daily Hold if SBP <110 or HR <55 and notify providers.       morphine sulfate 20 MG/5ML SOLN Take 5 mg by mouth every 4 hours as needed for Pain / SOB       nystatin (MYCOSTATIN) 760728 unit/mL SUSP suspension Swish and swallow 50,000 Units in mouth 4 times daily For recurrent Oral thrush-- no stop date at this time.  Review prn.       ondansetron (ZOFRAN) 8 MG tablet Take 1 tablet (8 mg) by mouth every 8 hours as needed for nausea       OTHER MEDICAL SUPPLIES every shift Document O2 Sat and L/Min AND every evening shift every Mon for Oxygen Maintenance Change: Tubing, Mask &/or humidifier and date clean concentrator filter as applicable every Monday.       pantoprazole sodium (PROTONIX) 40 MG packet Take 1 packet by mouth 2 times daily       Polyethylene Glycol 1450 POWD 17 g daily as needed Give 17 gram by mouth as needed for constipation QD prn, for constipation, dissolve each 17 gm dose in 240mls (8oz) of beverage       potassium chloride ER (K-TAB) 20 MEQ CR tablet Take 20 mEq by mouth 2 times daily        predniSONE (DELTASONE) 50 MG tablet Take 50 mg by mouth daily X 5 days and re-eval to continue for sob mngt.       senna-docusate (SENOKOT-S/PERICOLACE) 8.6-50 MG tablet Take 1 tablet by mouth 2 times daily as needed for constipation       sucralfate (CARAFATE) 1 GM tablet Take 1 g by mouth 4 times daily       tiotropium (SPIRIVA) 18 MCG inhaled capsule Inhale 18 mcg into the lungs daily 1 capsule inhale orally one time a day for prevent bronchospasm related to CHRONIC OBSTRUCTIVE PULMONARY DISEASE, UNSPECIFIED (J44.9)         MEDICATION CHANGES/RATIONALE:      /60   Pulse 105   Resp 18   SpO2 93%     ROS:   No CP,   dizziness, fevers, chills, HA, N/V, dysuria or bowel issues.  Appetite is variable.  No pain.   Occas dry cough, occas SOB.    Feels very weak.      Exam:  GENERAL APPEARANCE:  Alert, in no distress, oriented, cooperative  ENT:  Mouth with normal oral mucosa except has oral thrush on tongue.   RESP:  lungs clear to auscultation except with scattered BS, no wheezes.  CV:  Auscultation of heart done , IRRR, no murmur, rub, or gallop,  < + 1 bilat pedal edema, +2 pedal pulses  ABDOMEN:  normal bowel sounds, soft, nontender.  M/S:   STEPHENS equally, up with assist in room has walker  SKIN:  Inspection of skin and subcutaneous tissue baseline  NEURO:   Cranial nerves 2-12 are grossly intact and at patient's baseline  PSYCH:  oriented X 3, normal insight, judgement and memory,       SNF LABS:  Recent Labs   Lab Test 07/29/19 07/25/19   * 136   POTASSIUM 3.6 3.1*   CHLORIDE 100 100   CO2 25 25   ANIONGAP 9 11   GLC 97 84   BUN 18 16   CR 1.30* 1.14*   MELANIE 9.4 9.6     CBC RESULTS:   Recent Labs   Lab Test 07/29/19 07/25/19 07/15/19   WBC  --  5.0 17.4*   RBC  --   --  3.41*   HGB 7.4* 7.5* 9.6*   HCT  --   --  33.3*   MCV  --   --  98   MCH  --   --  28.2   MCHC  --   --  28.8*   RDW  --   --  18.7*   PLT  --   --  210             DISCHARGE PLAN:  TBA :Meeting with [facility] currently. Planning to sign onto Hospice at 3pm Follow-up with PCP in 7 days: 7 days.    Current [place] or other scheduled appointments:No future appointments.    MTM referral needed and placed by this provider: none    Pending labs: none     Discharge Treatments:see discharge orders       TOTAL DISCHARGE TIME:   Greater than 30 minutes    BLAIR Davis CNP         Documentation of Face to Face and Certification for Home Health Services    I certify that patient: Carine Rodriguez is under my care and that I, or a nurse practitioner or physician's assistant working with me, had a face-to-face encounter that meets the physician  face-to-face encounter requirements with this patient on: 7/31/2019.    This encounter with the patient was in whole, or in part, for the following medical condition, which is the primary reason for home health care:      Bilateral lung cancer (H)  Chronic obstructive pulmonary disease, unspecified COPD type (H)  Normocytic anemia  Oral thrush  Essential hypertension  Atrial fibrillation with RVR (H)  CKD (chronic kidney disease) stage 3, GFR 30-59 ml/min (H)  Physical deconditioning  Hospice care patient  .    I certify that, based on my findings, the following services are medically necessary home health services: Hospice.    My clinical findings support the need for the above services because: Hospice    Further, I certify that my clinical findings support that this patient is homebound (i.e. absences from home require considerable and taxing effort and are for medical reasons or [employer] services or infrequently or of short duration when for other reasons) because: Hospice.    Based on the above findings. I certify that this patient is confined to the home and needs intermittent skilled nursing care, physical therapy and/or speech therapy.  The patient is under my care, and I have initiated the establishment of the plan of care.  This patient will be followed by a physician who will periodically review the plan of care.  Physician/Provider to provide follow up care: Collette Meza    Attending hospital physician (the Medicare certified [place] provider): Dr Swati Rodriguez MD  Physician Signature: See electronic signature associated with these discharge orders.  Date: 7/31/2019

## 2019-07-31 NOTE — PATIENT INSTRUCTIONS
[facility] Discharge Orders    Name: Carine Rodriguez  : 1940  Planned Discharge Date: 19  Discharged to: previous independent home with Hospice    MEDICAL FOLLOW UP  Follow up with PCP in 1 week or with Hospice team  Follow up with Specialists per PCP      FUTURE LABS: No labs orders/due    ORDER CHANGES:  See below--also I stopped the folic acid and statin today    DISCHARGE MEDICATIONS:  The patient s pharmacy is authorized to dispense a 30-day supply of medications. Refill requests should be directed to the primary provider, Collette Meza .   MSO4 and Ativan ordered and have been supplied by [facility] and at [facility] already.    Current Outpatient Medications   Medication Sig Dispense Refill     acetaminophen (TYLENOL) 325 MG tablet Take 650 mg by mouth every 6 hours as needed for mild pain       albuterol (PROAIR HFA/PROVENTIL HFA/VENTOLIN HFA) 108 (90 Base) MCG/ACT inhaler Inhale 2 puffs into the lungs every 4 hours as needed for shortness of breath / dyspnea or wheezing       arformoterol (BROVANA) 15 MCG/2ML NEBU neb solution Take 15 mcg by nebulization 2 times daily 1 vial inhale orally via nebulizer two times a day for Pneumonia of right upper lobe due to infectious organism related to CHRONIC OBSTRUCTIVE PULMONARY DISEASE, UNSPECIFIED (J44.9)       budesonide (PULMICORT) 0.5 MG/2ML neb solution Take 0.5 mg by nebulization 2 times daily 0.5 mg inhale orally two times a day for Pneumonia of right upper lobe due to infectious organism related to CHRONIC OBSTRUCTIVE PULMONARY DISEASE, UNSPECIFIED (J44.9)       cetirizine (ZYRTEC) 10 MG tablet Take 10 mg by mouth daily       diltiazem ER COATED BEADS (DILTIAZEM CD) 300 MG 24 hr capsule Take 300 mg by mouth every morning Hold if SBP <110 or HR <55 and notify provider       furosemide (LASIX) 40 MG tablet Take 40 mg by mouth every morning       hydrocortisone 1 % CREA cream Place rectally 2 times daily as needed for  itching       hyoscyamine (ANASPAZ/LEVSIN) 0.125 MG tablet Take 0.125 mg by mouth every 4 hours as needed for cramping       ipratropium - albuterol 0.5 mg/2.5 mg/3 mL (DUONEB) 0.5-2.5 (3) MG/3ML neb solution Take 1 vial by nebulization 4 times daily And also taking every day prn for shortness of breath and wheezing       latanoprost (XALATAN) 0.005 % ophthalmic solution Place 1 drop into both eyes daily       LORazepam (ATIVAN) 2 MG/ML (HIGH CONC) solution Take 0.5 mg by mouth every 4 hours as needed for anxiety       metoprolol tartrate (LOPRESSOR) 100 MG tablet Take 100 mg by mouth 2 times daily Hold if SBP <110 or HR <55 and notify providers.       morphine sulfate 20 MG/5ML SOLN Take 5 mg by mouth every 4 hours as needed for Pain / SOB       nystatin (MYCOSTATIN) 697857 unit/mL SUSP suspension Swish and swallow 50,000 Units in mouth 4 times daily For recurrent Oral thrush-- no stop date at this time.  Review prn.       ondansetron (ZOFRAN) 8 MG tablet Take 1 tablet (8 mg) by mouth every 8 hours as needed for nausea       OTHER MEDICAL SUPPLIES every shift Document O2 Sat and L/Min AND every evening shift every Mon for Oxygen Maintenance Change: Tubing, Mask &/or humidifier and date clean concentrator filter as applicable every Monday.       pantoprazole sodium (PROTONIX) 40 MG packet Take 1 packet by mouth 2 times daily       Polyethylene Glycol 1450 POWD 17 g daily as needed Give 17 gram by mouth as needed for constipation QD prn, for constipation, dissolve each 17 gm dose in 240mls (8oz) of beverage       potassium chloride ER (K-TAB) 20 MEQ CR tablet Take 20 mEq by mouth 2 times daily        predniSONE (DELTASONE) 50 MG tablet Take 50 mg by mouth daily X 5 days and re-eval to continue for sob mngt.       senna-docusate (SENOKOT-S/PERICOLACE) 8.6-50 MG tablet Take 1 tablet by mouth 2 times daily as needed for constipation       sucralfate (CARAFATE) 1 GM tablet Take 1 g by mouth 4 times daily       tiotropium  (SPIRIVA) 18 MCG inhaled capsule Inhale 18 mcg into the lungs daily 1 capsule inhale orally one time a day for prevent bronchospasm related to CHRONIC OBSTRUCTIVE PULMONARY DISEASE, UNSPECIFIED (J44.9)         SERVICES:  Home Care:  Hospice and From:  [facility]    ADDITIONAL INSTRUCTIONS:  Has O2 and has CPAP    BLAIR Davis CNP  This document was electronically signed on July 31, 2019

## 2022-02-17 PROBLEM — N17.9 ACUTE RENAL FAILURE SUPERIMPOSED ON STAGE 3 CHRONIC KIDNEY DISEASE (H): Status: ACTIVE | Noted: 2019-07-12

## 2022-02-17 PROBLEM — N18.30 ACUTE RENAL FAILURE SUPERIMPOSED ON STAGE 3 CHRONIC KIDNEY DISEASE (H): Status: ACTIVE | Noted: 2019-07-12
